# Patient Record
Sex: FEMALE | Race: BLACK OR AFRICAN AMERICAN | Employment: FULL TIME | URBAN - METROPOLITAN AREA
[De-identification: names, ages, dates, MRNs, and addresses within clinical notes are randomized per-mention and may not be internally consistent; named-entity substitution may affect disease eponyms.]

---

## 2020-04-14 ENCOUNTER — HOSPITAL ENCOUNTER (EMERGENCY)
Age: 40
Discharge: HOME | End: 2020-04-14
Attending: EMERGENCY MEDICINE
Payer: COMMERCIAL

## 2020-04-14 VITALS
OXYGEN SATURATION: 99 % | DIASTOLIC BLOOD PRESSURE: 105 MMHG | RESPIRATION RATE: 28 BRPM | HEART RATE: 97 BPM | SYSTOLIC BLOOD PRESSURE: 176 MMHG | TEMPERATURE: 98.4 F | HEIGHT: 64 IN | BODY MASS INDEX: 38.53 KG/M2 | WEIGHT: 225.7 LBS

## 2020-04-14 LAB
ABSOLUTE EOS #: 0.1 K/UL (ref 0–0.44)
ABSOLUTE IMMATURE GRANULOCYTE: 0.04 K/UL (ref 0–0.3)
ABSOLUTE LYMPH #: 3.3 K/UL (ref 1.1–3.7)
ABSOLUTE MONO #: 0.85 K/UL (ref 0.1–1.2)
ANION GAP SERPL CALCULATED.3IONS-SCNC: 11 MMOL/L (ref 9–17)
BASOPHILS # BLD: 0 % (ref 0–2)
BASOPHILS ABSOLUTE: 0.04 K/UL (ref 0–0.2)
BUN BLDV-MCNC: 12 MG/DL (ref 6–20)
BUN/CREAT BLD: 14 (ref 9–20)
CALCIUM SERPL-MCNC: 9.7 MG/DL (ref 8.6–10.4)
CHLORIDE BLD-SCNC: 97 MMOL/L (ref 98–107)
CO2: 30 MMOL/L (ref 20–31)
CREAT SERPL-MCNC: 0.88 MG/DL (ref 0.5–0.9)
DIFFERENTIAL TYPE: NORMAL
EOSINOPHILS RELATIVE PERCENT: 1 % (ref 1–4)
GFR AFRICAN AMERICAN: >60 ML/MIN
GFR NON-AFRICAN AMERICAN: >60 ML/MIN
GFR SERPL CREATININE-BSD FRML MDRD: ABNORMAL ML/MIN/{1.73_M2}
GFR SERPL CREATININE-BSD FRML MDRD: ABNORMAL ML/MIN/{1.73_M2}
GLUCOSE BLD-MCNC: 96 MG/DL (ref 70–99)
HCT VFR BLD CALC: 38.2 % (ref 36.3–47.1)
HEMOGLOBIN: 12.1 G/DL (ref 11.9–15.1)
IMMATURE GRANULOCYTES: 0 %
LYMPHOCYTES # BLD: 32 % (ref 24–43)
MCH RBC QN AUTO: 27.1 PG (ref 25.2–33.5)
MCHC RBC AUTO-ENTMCNC: 31.7 G/DL (ref 28.4–34.8)
MCV RBC AUTO: 85.5 FL (ref 82.6–102.9)
MONOCYTES # BLD: 8 % (ref 3–12)
MYOGLOBIN: 30 NG/ML (ref 25–58)
MYOGLOBIN: 30 NG/ML (ref 25–58)
NRBC AUTOMATED: 0 PER 100 WBC
PDW BLD-RTO: 13.2 % (ref 11.8–14.4)
PLATELET # BLD: 418 K/UL (ref 138–453)
PLATELET ESTIMATE: NORMAL
PMV BLD AUTO: 9 FL (ref 8.1–13.5)
POTASSIUM SERPL-SCNC: 3.6 MMOL/L (ref 3.7–5.3)
RBC # BLD: 4.47 M/UL (ref 3.95–5.11)
RBC # BLD: NORMAL 10*6/UL
SEG NEUTROPHILS: 59 % (ref 36–65)
SEGMENTED NEUTROPHILS ABSOLUTE COUNT: 6.01 K/UL (ref 1.5–8.1)
SODIUM BLD-SCNC: 138 MMOL/L (ref 135–144)
TROPONIN INTERP: NORMAL
TROPONIN INTERP: NORMAL
TROPONIN T: NORMAL NG/ML
TROPONIN T: NORMAL NG/ML
TROPONIN, HIGH SENSITIVITY: 6 NG/L (ref 0–14)
TROPONIN, HIGH SENSITIVITY: 6 NG/L (ref 0–14)
WBC # BLD: 10.3 K/UL (ref 3.5–11.3)
WBC # BLD: NORMAL 10*3/UL

## 2020-04-14 PROCEDURE — 85025 COMPLETE CBC W/AUTO DIFF WBC: CPT

## 2020-04-14 PROCEDURE — 83874 ASSAY OF MYOGLOBIN: CPT

## 2020-04-14 PROCEDURE — 93005 ELECTROCARDIOGRAM TRACING: CPT | Performed by: NURSE PRACTITIONER

## 2020-04-14 PROCEDURE — 6360000002 HC RX W HCPCS: Performed by: NURSE PRACTITIONER

## 2020-04-14 PROCEDURE — 2500000003 HC RX 250 WO HCPCS: Performed by: NURSE PRACTITIONER

## 2020-04-14 PROCEDURE — 99283 EMERGENCY DEPT VISIT LOW MDM: CPT

## 2020-04-14 PROCEDURE — 80048 BASIC METABOLIC PNL TOTAL CA: CPT

## 2020-04-14 PROCEDURE — 96375 TX/PRO/DX INJ NEW DRUG ADDON: CPT

## 2020-04-14 PROCEDURE — 84484 ASSAY OF TROPONIN QUANT: CPT

## 2020-04-14 PROCEDURE — 96374 THER/PROPH/DIAG INJ IV PUSH: CPT

## 2020-04-14 RX ORDER — LISINOPRIL 20 MG/1
20 TABLET ORAL DAILY
Qty: 30 TABLET | Refills: 0 | Status: SHIPPED | OUTPATIENT
Start: 2020-04-14 | End: 2020-04-16

## 2020-04-14 RX ORDER — HYDRALAZINE HYDROCHLORIDE 20 MG/ML
10 INJECTION INTRAMUSCULAR; INTRAVENOUS ONCE
Status: COMPLETED | OUTPATIENT
Start: 2020-04-14 | End: 2020-04-14

## 2020-04-14 RX ORDER — METOPROLOL TARTRATE 5 MG/5ML
5 INJECTION INTRAVENOUS ONCE
Status: COMPLETED | OUTPATIENT
Start: 2020-04-14 | End: 2020-04-14

## 2020-04-14 RX ADMIN — METOPROLOL TARTRATE 5 MG: 5 INJECTION INTRAVENOUS at 10:46

## 2020-04-14 RX ADMIN — HYDRALAZINE HYDROCHLORIDE 10 MG: 20 INJECTION INTRAMUSCULAR; INTRAVENOUS at 11:44

## 2020-04-14 ASSESSMENT — PAIN DESCRIPTION - FREQUENCY: FREQUENCY: CONTINUOUS

## 2020-04-14 ASSESSMENT — PAIN DESCRIPTION - PROGRESSION: CLINICAL_PROGRESSION: NOT CHANGED

## 2020-04-14 ASSESSMENT — PAIN DESCRIPTION - PAIN TYPE: TYPE: ACUTE PAIN

## 2020-04-14 ASSESSMENT — PAIN DESCRIPTION - ONSET: ONSET: ON-GOING

## 2020-04-14 ASSESSMENT — PAIN DESCRIPTION - DESCRIPTORS: DESCRIPTORS: SHARP

## 2020-04-14 ASSESSMENT — PAIN DESCRIPTION - LOCATION: LOCATION: CHEST

## 2020-04-14 NOTE — ED PROVIDER NOTES
[address] [place] [address] ED  EMERGENCY DEPARTMENT ENCOUNTER   ATTENDING ATTESTATION     Pt Name: Candy Bradshaw  MRN: [id number]  [place] 1980  Date of evaluation: 4/14/20       Candy Bradshaw is a 37 y.o. female who presents with Hypertension      MDM:     Patient's EKG shows sinus rhythm with a rate of 90, MI QRS and QTC intervals unremarkable, the patient has normal axis no ST elevations or depressions, no significant T wave changes. Nonspecific EKG. Vitals:   Vitals:    04/14/20 1021 04/14/20 1026 04/14/20 1046 04/14/20 1101   BP: (!) 219/114  (!) 206/110 (!) 198/111   Pulse: 115  104 89   Resp:   19 24   Temp: 98.4 °F (36.9 °C)      SpO2: 99%  95% 94%   Weight:  225 lb 11.2 oz (102.4 kg)     Height:  5' 4\" (1.626 m)           I personally evaluated and examined the patient in conjunction with the Midlevel provider and agree with the assessment, treatment plan, and disposition of the patient as recorded by the midlevel. I performed a history and physical examination of the patient and discussed management with the midlevel. I reviewed the midlevels note and agree with the documented findings and plan of care. Any areas of disagreement are noted on the chart. I was personally present for the key portions of any procedures. I have documented in the chart those procedures where I was not present during the key portions. I have personally reviewed all images and agree with the midlevel's interpretation. I have reviewed the emergency nurses triage note. I agree with the chief complaint, past medical history, past surgical history, allergies, medications, social and family history as documented unless otherwise noted.     Aleksandra Cruz MD  Attending Emergency  Physician                  Serg Martinez MD  04/14/20 0614

## 2020-04-14 NOTE — ED NOTES
ASSESSMENT:   Presents to ED per friend from work. Works at [employer]. States last nite cough real hard and purpose to clear her throat and felt a sudden sharp pain to lt upper ant chest. Just to the lt of sternum. At work was very hot because had sterile gown and everything on. Was feeling anxious. States told staff to check her BP and was very high along with a fast heart rate. States was on lisinopril andoit a year ago, didn't have a Dr. Patel Basket in 2016 when had a child her BP was high and was on multiple BP meds and water pills. recent fever or chills. Denies H/A or vision issues. Denies chest pain other than the pain last nite after coughing hard. Denies cough or n/v. On admission is very pleasant and friendly. Neuro assess neg. Monitor shows /min  (posted).      Suzi Houser RN  04/14/20 3894

## 2020-04-15 LAB
EKG ATRIAL RATE: 90 BPM
EKG P AXIS: 41 DEGREES
EKG P-R INTERVAL: 190 MS
EKG Q-T INTERVAL: 382 MS
EKG QRS DURATION: 84 MS
EKG QTC CALCULATION (BAZETT): 467 MS
EKG R AXIS: 22 DEGREES
EKG T AXIS: 9 DEGREES
EKG VENTRICULAR RATE: 90 BPM

## 2020-04-15 ASSESSMENT — ENCOUNTER SYMPTOMS
NAUSEA: 0
SORE THROAT: 0
WHEEZING: 0
DIARRHEA: 0
SHORTNESS OF BREATH: 0
SINUS PRESSURE: 0
VOMITING: 0
CONSTIPATION: 0
RHINORRHEA: 0
ABDOMINAL PAIN: 0
COUGH: 0
COLOR CHANGE: 0

## 2020-04-16 ENCOUNTER — TELEMEDICINE (OUTPATIENT)
Dept: FAMILY MEDICINE CLINIC | Age: 40
End: 2020-04-16
Payer: COMMERCIAL

## 2020-04-16 VITALS
RESPIRATION RATE: 16 BRPM | HEIGHT: 64 IN | DIASTOLIC BLOOD PRESSURE: 106 MMHG | SYSTOLIC BLOOD PRESSURE: 188 MMHG | WEIGHT: 225 LBS | BODY MASS INDEX: 38.41 KG/M2

## 2020-04-16 PROBLEM — E66.01 CLASS 2 SEVERE OBESITY DUE TO EXCESS CALORIES WITH SERIOUS COMORBIDITY AND BODY MASS INDEX (BMI) OF 38.0 TO 38.9 IN ADULT (HCC): Status: ACTIVE | Noted: 2020-04-16

## 2020-04-16 PROBLEM — E66.812 CLASS 2 SEVERE OBESITY DUE TO EXCESS CALORIES WITH SERIOUS COMORBIDITY AND BODY MASS INDEX (BMI) OF 38.0 TO 38.9 IN ADULT: Status: ACTIVE | Noted: 2020-04-16

## 2020-04-16 PROCEDURE — G8428 CUR MEDS NOT DOCUMENT: HCPCS | Performed by: NURSE PRACTITIONER

## 2020-04-16 PROCEDURE — 99204 OFFICE O/P NEW MOD 45 MIN: CPT | Performed by: NURSE PRACTITIONER

## 2020-04-16 RX ORDER — LISINOPRIL 40 MG/1
40 TABLET ORAL DAILY
Qty: 30 TABLET | Refills: 1 | Status: SHIPPED | OUTPATIENT
Start: 2020-04-16 | End: 2020-06-16 | Stop reason: SDUPTHER

## 2020-04-16 RX ORDER — MULTIVIT-MIN/IRON FUM/FOLIC AC 7.5 MG-4
1 TABLET ORAL DAILY
Qty: 30 TABLET | Refills: 0 | COMMUNITY
Start: 2020-04-16 | End: 2020-07-07

## 2020-04-16 RX ORDER — CHLORTHALIDONE 25 MG/1
25 TABLET ORAL DAILY
Qty: 30 TABLET | Refills: 1 | Status: SHIPPED | OUTPATIENT
Start: 2020-04-16 | End: 2020-06-16 | Stop reason: SDUPTHER

## 2020-04-16 ASSESSMENT — ENCOUNTER SYMPTOMS
GASTROINTESTINAL NEGATIVE: 1
ANAL BLEEDING: 0
RESPIRATORY NEGATIVE: 1
CHEST TIGHTNESS: 0
VOMITING: 0
BLOOD IN STOOL: 0
WHEEZING: 0
EYES NEGATIVE: 1
DIARRHEA: 0
COUGH: 0
COLOR CHANGE: 0
SHORTNESS OF BREATH: 0
NAUSEA: 0
ALLERGIC/IMMUNOLOGIC NEGATIVE: 1

## 2020-04-16 NOTE — PROGRESS NOTES
Conjunctiva/sclera: Conjunctivae normal.   Neck:      Musculoskeletal: Normal range of motion and neck supple. Pulmonary:      Effort: Pulmonary effort is normal. No respiratory distress. Musculoskeletal: Normal range of motion. Skin:     General: Skin is warm and dry. Coloration: Skin is not jaundiced or pale. Findings: No bruising, erythema, lesion or rash. Neurological:      Mental Status: She is alert and oriented to person, place, and time. Psychiatric:         Mood and Affect: Mood normal.         Behavior: Behavior normal.         Thought Content: Thought content normal.         Judgment: Judgment normal.           Assessment:         1. Encounter to establish care    2. Essential hypertension    3. Mixed hyperlipidemia    4. Class 2 severe obesity due to excess calories with serious comorbidity and body mass index (BMI) of 38.0 to 38.9 in adult ([facility]    5. Hospital discharge follow-up        Plan:     1. Encounter to establish care    Preventative care to discuss at follow-up    2. Essential hypertension    - CBC Auto Differential; Future  - Comprehensive Metabolic Panel; Future  - TSH with Reflex; Future  - lisinopril (PRINIVIL;ZESTRIL) 40 MG tablet; Take 1 tablet by mouth daily  Dispense: 30 tablet; Refill: 1  - chlorthalidone (HYGROTON) 25 MG tablet; Take 1 tablet by mouth daily  Dispense: 30 tablet;  Refill: 1    Blood pressure grossly above goal  Increase lisinopril to 40 mg qd, add chlorthalidone  Side effects discussed   Monitor blood pressure qd at home, follow-up 2 weeks  Goal 130/80 or below   ER if symptomatic   Long term consequences of HTN discussed including CVA, CVD, CKD etc.  Lifestyle modifications discussed    Weight reduction - can decrease blood pressure by 5-20 points per 10 kg of weight loss   DASH Diet - consume diet rich in fruits, vegetables, and low-fat dairy products with a reduced content of saturated and total fat  Dietary sodium restriction - Reduce dietary sodium intake to no more than 100meq/day (2.4 g sodium)  Physical activity - Engage in regular aerobic physical activity routinely (150min/wk or 30 minutes most days) such as brisk walking, swimming, running, cycling  Moderate consumption of alcohol - limit to more than 2 drinks/day in most men and 1 drink/day in women   Smoking cessation     Credit - UpToDate     3. Mixed hyperlipidemia    - Lipid, Fasting; Future    4. Class 2 severe obesity due to excess calories with serious comorbidity and body mass index (BMI) of 38.0 to 38.9 in adult (HCC)    - CBC Auto Differential; Future  - Comprehensive Metabolic Panel; Future  - Hemoglobin A1C; Future    Encouraged routine exercise 150 min/week  Encouraged dietary modifications including avoidance of carbs, sugary foods, diet high in fruits, vegetables and protein  Discussed [facility] in the future    5. Hospital discharge follow-up    ER results / records reviewed thoroughly     Julia Campos is a 37 y.o. female being evaluated by a Virtual Visit (video visit) encounter to address concerns as mentioned above. A caregiver was present when appropriate. Due to this being a TeleHealth encounter (During PKICO-93 public health emergency), evaluation of the following organ systems was limited: Vitals/Constitutional/EENT/Resp/CV/GI//MS/Neuro/Skin/Heme-Lymph-Imm. Pursuant to the emergency declaration under the [facility], [address] [place] [address] authority and the [employer] and [employer] Act, this Virtual Visit was conducted with patient's (and/or legal guardian's) consent, to reduce the patient's risk of exposure to COVID-19 and provide necessary medical care. The patient (and/or legal guardian) has also been advised to contact this office for worsening conditions or problems, and seek emergency medical treatment and/or call 911 if deemed necessary.      Services were provided through a

## 2020-04-17 ENCOUNTER — TELEPHONE (OUTPATIENT)
Dept: FAMILY MEDICINE CLINIC | Age: 40
End: 2020-04-17

## 2020-04-17 RX ORDER — ADHESIVE BANDAGE 3/4"
BANDAGE TOPICAL
Qty: 1 EACH | Refills: 0 | Status: SHIPPED | OUTPATIENT
Start: 2020-04-17 | End: 2020-05-04

## 2020-04-17 NOTE — TELEPHONE ENCOUNTER
The [address] she requested is not available to e-send scripts in Epic. Since I am not in the office to fax I sent RX to Terri instead, unless office can call and give verbal order to [facility] [address]

## 2020-04-17 NOTE — TELEPHONE ENCOUNTER
Patient is asking Palm[facility] to order her a blood pressure cuff so she can monitor her numbers and report back to Palmira. She states the ED was going to fax it to the pharmacy , but the are still waiting. Please contact her today to advise if able to do so. Patient is requesting this to be sent to [facility] on [address]. [phone]    Thank you.

## 2020-04-23 ENCOUNTER — TELEPHONE (OUTPATIENT)
Dept: FAMILY MEDICINE CLINIC | Age: 40
End: 2020-04-23

## 2020-04-23 NOTE — TELEPHONE ENCOUNTER
If she has chest pain or shortness of breath and her blood pressure is that high then yes needs to go to ER.

## 2020-05-04 ENCOUNTER — TELEMEDICINE (OUTPATIENT)
Dept: FAMILY MEDICINE CLINIC | Age: 40
End: 2020-05-04
Payer: COMMERCIAL

## 2020-05-04 VITALS — DIASTOLIC BLOOD PRESSURE: 88 MMHG | RESPIRATION RATE: 16 BRPM | SYSTOLIC BLOOD PRESSURE: 145 MMHG

## 2020-05-04 PROCEDURE — 99214 OFFICE O/P EST MOD 30 MIN: CPT | Performed by: NURSE PRACTITIONER

## 2020-05-04 PROCEDURE — G8428 CUR MEDS NOT DOCUMENT: HCPCS | Performed by: NURSE PRACTITIONER

## 2020-05-04 RX ORDER — HYDROXYZINE HYDROCHLORIDE 25 MG/1
25-50 TABLET, FILM COATED ORAL 3 TIMES DAILY PRN
Qty: 90 TABLET | Refills: 0 | Status: SHIPPED | OUTPATIENT
Start: 2020-05-04 | End: 2020-08-18

## 2020-05-04 RX ORDER — AMLODIPINE BESYLATE 5 MG/1
5 TABLET ORAL DAILY
Qty: 30 TABLET | Refills: 2 | Status: SHIPPED | OUTPATIENT
Start: 2020-05-04 | End: 2020-07-07 | Stop reason: SDUPTHER

## 2020-05-04 ASSESSMENT — ENCOUNTER SYMPTOMS
EYES NEGATIVE: 1
NAUSEA: 0
VOMITING: 0
GASTROINTESTINAL NEGATIVE: 1
DIARRHEA: 0
ALLERGIC/IMMUNOLOGIC NEGATIVE: 1
CHEST TIGHTNESS: 0
WHEEZING: 0
SHORTNESS OF BREATH: 0
COLOR CHANGE: 0
COUGH: 0
RESPIRATORY NEGATIVE: 1

## 2020-05-04 NOTE — PROGRESS NOTES
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 Negative for appetite change, chills, diaphoresis, fever and unexpected weight change. HENT: Negative. Eyes: Negative. Respiratory: Negative. Negative for cough, chest tightness, shortness of breath and wheezing. Cardiovascular: Positive for chest pain (chest soreness s/p muscle pull). Negative for palpitations and leg swelling. Gastrointestinal: Negative. Negative for diarrhea, nausea and vomiting. Endocrine: Negative. Genitourinary: Negative. Musculoskeletal: Negative. Skin: Negative. Negative for color change, pallor, rash and wound. Allergic/Immunologic: Negative. Neurological: Positive for dizziness. Negative for seizures, speech difficulty and headaches. Hematological: Negative. Psychiatric/Behavioral: Negative for sleep disturbance. The patient is nervous/anxious. Objective:     Physical Exam  Constitutional:       General: She is not in acute distress. Appearance: Normal appearance. She is obese.  She is not ill-appearing, toxic-appearing or diaphoretic. HENT:      Head: Normocephalic and atraumatic. Right Ear: External ear normal.      Left Ear: External ear normal.      Nose: Nose normal.   Eyes:      General: No scleral icterus. Right eye: No discharge. Left eye: No discharge. Conjunctiva/sclera: Conjunctivae normal.   Pulmonary:      Effort: Pulmonary effort is normal. No respiratory distress. Musculoskeletal: Normal range of motion. Skin:     General: Skin is warm and dry. Coloration: Skin is not jaundiced or pale. Findings: No bruising, erythema, lesion or rash. Neurological:      Mental Status: She is alert and oriented to person, place, and time. Psychiatric:         Attention and Perception: Attention and perception normal.         Mood and Affect: Affect normal. Mood is anxious. Speech: Speech normal.         Behavior: Behavior normal. Behavior is cooperative. Thought Content: Thought content normal. Thought content does not include homicidal or suicidal ideation. Cognition and Memory: Cognition and memory normal.         Judgment: Judgment normal.       Vitals:    05/04/20 1452   BP: (!) 145/88   Resp: 16       Assessment:         1. Essential hypertension    2. Adjustment disorder with anxious mood        Plan:     1. Essential hypertension    - amLODIPine (NORVASC) 5 MG tablet; Take 1 tablet by mouth daily  Dispense: 30 tablet; Refill: 2    Blood pressure above goal   Continue lisinopril / chlorthalidone  ADD amlodipine   Side effects discussed  Continue close home monitoring  F/u in office 3-4 weeks with log to review   Update labs as discussed  Lifestyle modifications    2. Adjustment disorder with anxious mood    - hydrOXYzine (ATARAX) 25 MG tablet; Take 1-2 tablets by mouth 3 times daily as needed for Anxiety  Dispense: 90 tablet;  Refill: 0    Encouraged continued counseling / CBT  Reassurance given   Discussed daily vs. PRN medication  Opted for PRN medication, side

## 2020-05-05 ENCOUNTER — HOSPITAL ENCOUNTER (OUTPATIENT)
Age: 40
Discharge: HOME | End: 2020-05-05
Payer: COMMERCIAL

## 2020-05-05 LAB
ABSOLUTE EOS #: 0.28 K/UL (ref 0–0.44)
ABSOLUTE IMMATURE GRANULOCYTE: 0.03 K/UL (ref 0–0.3)
ABSOLUTE LYMPH #: 2.43 K/UL (ref 1.1–3.7)
ABSOLUTE MONO #: 0.79 K/UL (ref 0.1–1.2)
ALBUMIN SERPL-MCNC: 3.9 G/DL (ref 3.5–5.2)
ALBUMIN/GLOBULIN RATIO: 1 (ref 1–2.5)
ALP BLD-CCNC: 56 U/L (ref 35–104)
ALT SERPL-CCNC: 14 U/L (ref 5–33)
ANION GAP SERPL CALCULATED.3IONS-SCNC: 12 MMOL/L (ref 9–17)
AST SERPL-CCNC: 16 U/L
BASOPHILS # BLD: 1 % (ref 0–2)
BASOPHILS ABSOLUTE: 0.04 K/UL (ref 0–0.2)
BILIRUB SERPL-MCNC: 0.21 MG/DL (ref 0.3–1.2)
BUN BLDV-MCNC: 20 MG/DL (ref 6–20)
BUN/CREAT BLD: ABNORMAL (ref 9–20)
CALCIUM SERPL-MCNC: 9.3 MG/DL (ref 8.6–10.4)
CHLORIDE BLD-SCNC: 98 MMOL/L (ref 98–107)
CHOLESTEROL, FASTING: 212 MG/DL
CHOLESTEROL/HDL RATIO: 4.6
CO2: 28 MMOL/L (ref 20–31)
CREAT SERPL-MCNC: 0.89 MG/DL (ref 0.5–0.9)
DIFFERENTIAL TYPE: ABNORMAL
EOSINOPHILS RELATIVE PERCENT: 3 % (ref 1–4)
ESTIMATED AVERAGE GLUCOSE: 114 MG/DL
GFR AFRICAN AMERICAN: >60 ML/MIN
GFR NON-AFRICAN AMERICAN: >60 ML/MIN
GFR SERPL CREATININE-BSD FRML MDRD: ABNORMAL ML/MIN/{1.73_M2}
GFR SERPL CREATININE-BSD FRML MDRD: ABNORMAL ML/MIN/{1.73_M2}
GLUCOSE BLD-MCNC: 93 MG/DL (ref 70–99)
HBA1C MFR BLD: 5.6 % (ref 4–6)
HCT VFR BLD CALC: 38 % (ref 36.3–47.1)
HDLC SERPL-MCNC: 46 MG/DL
HEMOGLOBIN: 11.5 G/DL (ref 11.9–15.1)
IMMATURE GRANULOCYTES: 0 %
LDL CHOLESTEROL: 147 MG/DL (ref 0–130)
LYMPHOCYTES # BLD: 30 % (ref 24–43)
MCH RBC QN AUTO: 26.5 PG (ref 25.2–33.5)
MCHC RBC AUTO-ENTMCNC: 30.3 G/DL (ref 28.4–34.8)
MCV RBC AUTO: 87.6 FL (ref 82.6–102.9)
MONOCYTES # BLD: 10 % (ref 3–12)
NRBC AUTOMATED: 0 PER 100 WBC
PDW BLD-RTO: 12.7 % (ref 11.8–14.4)
PLATELET # BLD: 453 K/UL (ref 138–453)
PLATELET ESTIMATE: ABNORMAL
PMV BLD AUTO: 9.9 FL (ref 8.1–13.5)
POTASSIUM SERPL-SCNC: 3.9 MMOL/L (ref 3.7–5.3)
RBC # BLD: 4.34 M/UL (ref 3.95–5.11)
RBC # BLD: ABNORMAL 10*6/UL
SEG NEUTROPHILS: 56 % (ref 36–65)
SEGMENTED NEUTROPHILS ABSOLUTE COUNT: 4.64 K/UL (ref 1.5–8.1)
SODIUM BLD-SCNC: 138 MMOL/L (ref 135–144)
TOTAL PROTEIN: 7.7 G/DL (ref 6.4–8.3)
TRIGLYCERIDE, FASTING: 94 MG/DL
TSH SERPL DL<=0.05 MIU/L-ACNC: 0.62 MIU/L (ref 0.3–5)
VLDLC SERPL CALC-MCNC: ABNORMAL MG/DL (ref 1–30)
WBC # BLD: 8.2 K/UL (ref 3.5–11.3)
WBC # BLD: ABNORMAL 10*3/UL

## 2020-05-05 PROCEDURE — 85025 COMPLETE CBC W/AUTO DIFF WBC: CPT

## 2020-05-05 PROCEDURE — 84443 ASSAY THYROID STIM HORMONE: CPT

## 2020-05-05 PROCEDURE — 80053 COMPREHEN METABOLIC PANEL: CPT

## 2020-05-05 PROCEDURE — 80061 LIPID PANEL: CPT

## 2020-05-05 PROCEDURE — 83036 HEMOGLOBIN GLYCOSYLATED A1C: CPT

## 2020-05-05 PROCEDURE — 36415 COLL VENOUS BLD VENIPUNCTURE: CPT

## 2020-05-14 ENCOUNTER — TELEPHONE (OUTPATIENT)
Dept: FAMILY MEDICINE CLINIC | Age: 40
End: 2020-05-14

## 2020-05-14 NOTE — TELEPHONE ENCOUNTER
You started pt on amlodipine  5 mg and yesterday every time she stood up she was dizzy and had to sit down and she felt fatigued. She called a nurse at [address] [place] [address]. The nurse told her she may be taking to many meds.   Pt took her bp standing up and it was  123/76 and sittting it was 128/86

## 2020-06-02 ENCOUNTER — OFFICE VISIT (OUTPATIENT)
Dept: FAMILY MEDICINE CLINIC | Age: 40
End: 2020-06-02
Payer: COMMERCIAL

## 2020-06-02 VITALS
WEIGHT: 228.2 LBS | OXYGEN SATURATION: 98 % | HEART RATE: 110 BPM | SYSTOLIC BLOOD PRESSURE: 118 MMHG | BODY MASS INDEX: 39.17 KG/M2 | TEMPERATURE: 97.2 F | DIASTOLIC BLOOD PRESSURE: 80 MMHG

## 2020-06-02 PROBLEM — R00.0 TACHYCARDIA: Status: ACTIVE | Noted: 2020-06-02

## 2020-06-02 PROCEDURE — 1036F TOBACCO NON-USER: CPT | Performed by: NURSE PRACTITIONER

## 2020-06-02 PROCEDURE — 93000 ELECTROCARDIOGRAM COMPLETE: CPT | Performed by: NURSE PRACTITIONER

## 2020-06-02 PROCEDURE — G8427 DOCREV CUR MEDS BY ELIG CLIN: HCPCS | Performed by: NURSE PRACTITIONER

## 2020-06-02 PROCEDURE — G8417 CALC BMI ABV UP PARAM F/U: HCPCS | Performed by: NURSE PRACTITIONER

## 2020-06-02 PROCEDURE — 99214 OFFICE O/P EST MOD 30 MIN: CPT | Performed by: NURSE PRACTITIONER

## 2020-06-02 RX ORDER — ECHINACEA 400 MG
1 CAPSULE ORAL DAILY
Qty: 1 CAPSULE | Refills: 0 | COMMUNITY
Start: 2020-06-02 | End: 2020-07-07 | Stop reason: ALTCHOICE

## 2020-06-02 RX ORDER — MELATONIN
1000 DAILY
Qty: 30 TABLET | Refills: 0 | COMMUNITY
Start: 2020-06-02 | End: 2020-09-15 | Stop reason: ALTCHOICE

## 2020-06-02 ASSESSMENT — ENCOUNTER SYMPTOMS
RESPIRATORY NEGATIVE: 1
SHORTNESS OF BREATH: 0
COLOR CHANGE: 0
EYES NEGATIVE: 1
COUGH: 0
CHEST TIGHTNESS: 0
ALLERGIC/IMMUNOLOGIC NEGATIVE: 1
GASTROINTESTINAL NEGATIVE: 1
WHEEZING: 0

## 2020-06-02 NOTE — PATIENT INSTRUCTIONS
Weight reduction - can decrease blood pressure by 5-20 points per 10 kg of weight loss   DASH Diet - consume diet rich in fruits, vegetables, and low-fat dairy products with a reduced content of saturated and total fat  Dietary sodium restriction - Reduce dietary sodium intake to no more than 100meq/day (2.4 g sodium)  Physical activity - Engage in regular aerobic physical activity routinely (150min/wk or 30 minutes most days) such as brisk walking, swimming, running, cycling  Moderate consumption of alcohol - limit to more than 2 drinks/day in most men and 1 drink/day in women   Smoking cessation     Credit - UpToDate   Patient Education        High Cholesterol: Care Instructions  Your Care Instructions     Cholesterol is a type of fat in your blood. It is needed for many body functions, such as making new cells. Cholesterol is made by your body. It also comes from food you eat. High cholesterol means that you have too much of the fat in your blood. This raises your risk of a heart attack and stroke. LDL and HDL are part of your total cholesterol. LDL is the \"bad\" cholesterol. High LDL can raise your risk for heart disease, heart attack, and stroke. HDL is the \"good\" cholesterol. It helps clear bad cholesterol from the body. High HDL is linked with a lower risk of heart disease, heart attack, and stroke. Your cholesterol levels help your doctor find out your risk for having a heart attack or stroke. You and your doctor can talk about whether you need to lower your risk and what treatment is best for you. A heart-healthy lifestyle along with medicines can help lower your cholesterol and your risk. The way you choose to lower your risk will depend on how high your risk is for heart attack and stroke. It will also depend on how you feel about taking medicines. Follow-up care is a key part of your treatment and safety. Be sure to make and go to all appointments, and call your doctor if you are having problems.  It's disclaims any warranty or liability for your use of this information.

## 2020-06-04 ENCOUNTER — TELEPHONE (OUTPATIENT)
Dept: BARIATRICS/WEIGHT MGMT | Age: 40
End: 2020-06-04

## 2020-06-04 NOTE — TELEPHONE ENCOUNTER
Online Info Session Completed:  on 06/04/20 okay to schedule with Dr. Katelynn Carver   with [facility]    Patient informed the following: This is NOT a guarantee of payment  When stating that you have a Benefit or Coverage for Bariatric Surgery - that means that you may qualify for the surgery  Bariatric Surgery is considered an elective procedure, patient is responsible to know their benefits . Any information we obtain when calling your insurance  is not  a guarantee of  coverage  and/or  benefit. Appointment Note :   New Patient , [facility],   6   month visits,  PG Fee $200,  Mailed Packet or advised to arrive  early      Remind Patient of $200 Program fee with $ 100 required at Second visit with office on initial dietician visit. Remind Patient they must be nicotine free. They will be tested at the beginning of the program and prior to surgery. Advise Patient Responsible for out of pocket, copay at medical visits, Deductible and coinsurance applied to medical visits and procedure. You will be responsible for any of the following:  · Copays   · Deductibles   · Co insurances     The items mentioned above are indicated or required by your insurance plan. Your deductible and coinsurance are applied to medical visits and procedures. Verified with patient if he or she has had any previous bariatric surgery? no  ( If yes ,advise patient of transfer of care process and program fee)       .

## 2020-06-16 DIAGNOSIS — I10 ESSENTIAL HYPERTENSION: ICD-10-CM

## 2020-06-16 RX ORDER — LISINOPRIL 40 MG/1
40 TABLET ORAL DAILY
Qty: 30 TABLET | Refills: 1 | Status: SHIPPED | OUTPATIENT
Start: 2020-06-16 | End: 2020-07-07 | Stop reason: ALTCHOICE

## 2020-06-16 RX ORDER — CHLORTHALIDONE 25 MG/1
25 TABLET ORAL DAILY
Qty: 30 TABLET | Refills: 1 | Status: SHIPPED | OUTPATIENT
Start: 2020-06-16 | End: 2020-07-07 | Stop reason: SDUPTHER

## 2020-06-16 NOTE — TELEPHONE ENCOUNTER
Last visit: 03/02/20  Last Med refill: 04/16/20  Does patient have enough medication for 72 hours: Yes    Next Visit Date:07/07/20  Future Appointments   Date Time Provider Nevin Sanchez   6/17/2020  9:15 AM Yessica Krishna, DO bariatric caballero Konrad Garcia   7/7/2020  1:40 PM Essie Holly, APRN - CNP [facility] [address] Maintenance   Topic Date Due    DTaP/Tdap/Td vaccine (1 - Tdap) 11/06/1997    Cervical cancer screen  01/19/2018    Flu vaccine (Season Ended) 09/01/2020    Potassium monitoring  05/05/2021    Creatinine monitoring  05/05/2021    Lipid screen  05/05/2025    HIV screen  Completed    Hepatitis A vaccine  Aged Out    Hepatitis B vaccine  Aged Out    Hib vaccine  Aged Out    Meningococcal (ACWY) vaccine  Aged Out    Pneumococcal 0-64 years Vaccine  Aged Out       Hemoglobin A1C (%)   Date Value   05/05/2020 5.6   01/14/2016 5.2   07/30/2014 5.0             ( goal A1C is < 7)   No results found for: LABMICR  LDL Cholesterol (mg/dL)   Date Value   05/05/2020 147 (H)   07/30/2014 92       (goal LDL is <100)   AST (U/L)   Date Value   05/05/2020 16     ALT (U/L)   Date Value   05/05/2020 14     BUN (mg/dL)   Date Value   05/05/2020 20     BP Readings from Last 3 Encounters:   06/02/20 118/80   05/04/20 (!) 145/88   04/16/20 (!) 188/106          (goal 120/80)    All Future Testing planned in [facility]  Lab Frequency Next Occurrence   Magnesium Once 06/02/2020               Patient Active Problem List:     Hyperlipidemia     Essential hypertension     Class 2 severe obesity due to excess calories with serious comorbidity and body mass index (BMI) of 38.0 to 38.9 in adult (HCC)     Tachycardia

## 2020-06-17 ENCOUNTER — OFFICE VISIT (OUTPATIENT)
Dept: BARIATRICS/WEIGHT MGMT | Age: 40
End: 2020-06-17
Payer: COMMERCIAL

## 2020-06-17 VITALS
BODY MASS INDEX: 39.09 KG/M2 | HEART RATE: 64 BPM | TEMPERATURE: 97.6 F | DIASTOLIC BLOOD PRESSURE: 70 MMHG | WEIGHT: 229 LBS | SYSTOLIC BLOOD PRESSURE: 110 MMHG | HEIGHT: 64 IN

## 2020-06-17 PROCEDURE — G8427 DOCREV CUR MEDS BY ELIG CLIN: HCPCS | Performed by: SURGERY

## 2020-06-17 PROCEDURE — 1036F TOBACCO NON-USER: CPT | Performed by: SURGERY

## 2020-06-17 PROCEDURE — 99204 OFFICE O/P NEW MOD 45 MIN: CPT | Performed by: SURGERY

## 2020-06-17 PROCEDURE — G8417 CALC BMI ABV UP PARAM F/U: HCPCS | Performed by: SURGERY

## 2020-06-17 NOTE — LETTER
Visit Date: 6/17/2020    Patient: Victorino Schaumann  YOB: 1980    Dear Dr. Alex Wagner, APRN - CNP,      I had the pleasure of seeing Roosevelt Jimenez in the office today for a consult for weight loss surgery. Her current Weight: 229 lb (103.9 kg), which gives her a Body mass index is 39 kg/m². We had a long discussion regarding surgical options for weight loss and improvement in co-morbidities. She is considering a bariatric procedure. We will start the preoperative workup, which includes bloodwork, psychological evaluation, support group attendance, and preoperative medical clearance from you. We will also initiate pre-certification for surgery, which requires a letter of medical necessity from you and often office notes documenting a weight history and co-morbidities. Roosevelt Jimenez will require 6 months of medically supervised visits as specified by the patient's insurance. Thank you for allowing me to participate in the care of your patient. If you have any questions or concerns, please do not hesitate to call.       Sincerely,     Laura Álvarez DO  Director of Bariatric and Minimally Invasive Surgery  [facility]CI[facility] MARLOW [facility], [address] [facility], [address] [facility] [place]els: [phone]  F: [phone]

## 2020-06-28 NOTE — PROGRESS NOTES
[facility]  [facility]  [address] [address]  Dept: [phone]    SURGICAL WEIGHT MANAGEMENT PROGRAM  PROGRESS NOTE INITIAL EVALUATION     Patient: Aurelia Atkinson        Service Date: 2020      HPI:     Chief Complaint   Patient presents with    Bariatric, Initial Visit    Weight Loss       The patient is a pleasant 37y.o. year old female  with morbid obesity, who stands Height: 5' 4.25\" (163.2 cm) tall with a weight of Weight: 229 lb (103.9 kg) , resulting in a BMI of Body mass index is 39 kg/m². The patient suffers from multiple co-morbidities as a result of morbid obesity, including: Hypertension, Hyperlipidemia, GERD, Depression and Anxiety. She has suffered from obesity for many years. The patient denies  a history of myocardial infarction, deep vein thrombosis, pulmonary embolism, renal failure, hepatic failure, stroke and seizure. The patient has failed multiple attempts at non-surgical weight loss, and is now seeking surgical intervention to promote permanent and consistent weight loss. She  has chosen Deepak-en-Y Gastric Bypass. She is well educated regarding it, as she has recently viewed our weight loss surgery informational seminar .      Medical History:  Past Medical History:   Diagnosis Date    Anxiety     GERD (gastroesophageal reflux disease)     Hyperlipidemia     Hypertension     Major depressive disorder, recurrent episode, mild ([facility] [address] 10/21/2015       Surgical History:  Past Surgical History:   Procedure Laterality Date     SECTION      x1       Family History:      Problem Relation Age of Onset    Hypertension Mother     Asthma Mother     Hypertension Maternal Aunt     Asthma Maternal Uncle     Hypertension Maternal Uncle     Arthritis Maternal Grandmother     Hypertension Maternal Grandmother     Stroke Maternal Grandmother     Breast Cancer Neg Hx     Cancer Neg Hx     Colon Cancer Neg Hx     Diabetes Neg Hx     Eclampsia Neg Hx     Ovarian Cancer Neg Hx      Labor Neg Hx     Spont Abortions Neg Hx        Social History:   Social History     Tobacco Use    Smoking status: Never Smoker    Smokeless tobacco: Never Used   Substance Use Topics    Alcohol use: No     Alcohol/week: 0.0 standard drinks    Drug use: No       Current Med List:  Current Outpatient Medications   Medication Sig Dispense Refill    lisinopril (PRINIVIL;ZESTRIL) 40 MG tablet Take 1 tablet by mouth daily 30 tablet 1    chlorthalidone (HYGROTON) 25 MG tablet Take 1 tablet by mouth daily 30 tablet 1    Flaxseed Oil OIL OTC supplement 1 Bottle 0    vitamin D3 (CHOLECALCIFEROL) 25 MCG (1000 UT) TABS tablet Take 1 tablet by mouth daily 30 tablet 0    Black Elderberry,Berry-Flower, 575 MG CAPS Take 1 tablet by mouth daily 1 capsule 0    Garlic 10 MG CAPS Take 1 tablet by mouth daily 1 capsule 0    amLODIPine (NORVASC) 5 MG tablet Take 1 tablet by mouth daily 30 tablet 2    hydrOXYzine (ATARAX) 25 MG tablet Take 1-2 tablets by mouth 3 times daily as needed for Anxiety 90 tablet 0    Multiple Vitamins-Minerals (MULTIVITAMIN WITH MINERALS) tablet Take 1 tablet by mouth daily 30 tablet 0     No current facility-administered medications for this visit. No Known Allergies       SOCIAL:      This patient is alone for the evaluation today.       [] HIV Risk Factors (i.e.) intravenous drug abuser; at risk sexual behavior; received blood products    [] TB Risk Factors (i.e.) Medically underserved, institutional care, foreign born, endemic area; exposure to active case    [] Hepatitis B&C Risk Factors (i.e.) Received blood transfusion prior to ; recreational drug use; high risk sexual behaviors; tattoos or body piercings; contact with blood or needle sticks in the workplace    Comprehension    Ability to grasp concepts and respond to questions:   [x] High   [] Medium   [] Low    Motivation    [x] Asks Questions; eager to learn   [] Needs education   [] Extreme anxiety    [] uncooperative   [] Denies need for education    English Speaking Ability    [x] Speaks English well   [] Reads [place] well   [] Understands spoken english    [] Understands written English   [] No need for interpretive support      [] Might benefit from interpretive support   []  required for all services     REVIEW OF SYSTEMS: (Negative unless marked otherwise)       Do you or have you had any of the following?   Cardiovascular YES NO Respiratory YES NO   High Blood Pressure   [x]   [] COPD   []   []   Heart Attack   []   [] TB/Positive skin Test   []   []   Congestive Heart Failure   []   [] Obstructive Sleep Apnea   []   []   Coronary Artery Disease   []   [] Asthma   []   [x]   Circulation Problems   []   []      Activity Intolerance   []   [] Gastrointestinal YES NO   Peripheral Vascular Disease   []   [] Gastric Problems   []   []        Colorectal problems   []   []   Hematological YES NO Ulcer disease   []   []   Bleeding Tendencies   []   [] Liver disease   []   []   Blood Transfusion last 30d   []   [] Gallstones   []   []   Anemia   []   [] Refulx or Heartburn   [x]   []   Blood Clots   []   []      High Cholesterol   [x]   [] Muscoloskeletal YES NO   High Triglycerides   []   [] Joint Limitations   []   []      Muscle Weakness   []   []   Eyes, Ears, Nose, Throat YES NO Multiple Sclerosis   []   []   Cataracts   [x]   [] Arthritis   []   []   Glasses   [x]   []      Blurred Vision   []   [] Cancer   []   []   Hearing Aids   []   [] Type:     Ringing in Ears   []   []      Difficulty Swallowing   []   [] Encodrine YES NO      Diabetes   []   []   Neurological YES NO Thyroid   []   []   Stroke   []   []      Seizure   []   [] Psychiatric Disorder YES NO   Dizziness/Blackouts/Fainting   []   [] Depression   []   []   Memory Impairement   []   [] Bipolar   []   []   Parkinson's   []   [] Anxiety disorder   [x]   [] CHEST STANDARD (2 VW)    Full PFT Study With Bronchodilator    INES Hanna MD, Cardiology, Perlie Gilford, MD, Pulmonology, [place]    Baseline Diagnostic Sleep Study   2. Essential hypertension  CBC Auto Differential    Comprehensive Metabolic Panel    Ferritin    Hemoglobin A1C    Iron and TIBC    TSH without Reflex    T4, Free    PTH, Intact    Magnesium    Lipid Panel    Vitamin A    Vitamin B1    Vitamin B12 & Folate    Vitamin D 25 Hydroxy    Zinc    Urine Drug Screen    Nicotine, Blood    XR CHEST STANDARD (2 VW)    Full PFT Study With Bronchodilator    INES Hanna MD, Cardiology, Perlie Gilford, MD, Pulmonology, [place]    Baseline Diagnostic Sleep Study   3. Gastroesophageal reflux disease without esophagitis  CBC Auto Differential    Comprehensive Metabolic Panel   4. Snoring  Full PFT Study With Bronchodilator    Reina Solis MD, Pulmonology, [place]    Baseline Diagnostic Sleep Study          Initial Testing     Primary Procedure: Deepak-en-Y Gastric Bypass     Other Procedures:None    Labwork: Initial Pre-surgical Lab Tests (CMP, TSH, Fasting Lipid Profile, Mg, Zinc, Vit B1 (whole blood), Vit B12, 25-OH Vit D, Fe,  Ferritin,  Folate)    Imaging: None    Endoscopic Studies: Upper GI Endoscopy for GERD which has been untreated. Psychological Assessment: Psychological Evaluation and Clearance    Nutrition Assessment: Bariatric Nutrition Assessment and Clearance    Cardiology Risk Stratification:  Cardiology clearance with echo    Pulmonary Evaluation: Obstructive Sleep Apnea Evaluation, Pulmonary Clearance, PFT Screen and Copy of Previous Sleep Study    Other  Consultations: PCP clearance    Physician Supervised Diet and Exercise required by the patients insurance company: 6 months.       Surgical Diet requirement:  2 weeks    Final Testing  Screening Chest Xray  and EKG within 6 months of date of surgery    Labwork:  Final Lab Tests  within 3

## 2020-07-03 ENCOUNTER — HOSPITAL ENCOUNTER (OUTPATIENT)
Age: 40
Discharge: HOME | End: 2020-07-05
Payer: COMMERCIAL

## 2020-07-03 ENCOUNTER — HOSPITAL ENCOUNTER (OUTPATIENT)
Dept: GENERAL RADIOLOGY | Age: 40
Discharge: HOME | End: 2020-07-05
Payer: COMMERCIAL

## 2020-07-03 ENCOUNTER — HOSPITAL ENCOUNTER (OUTPATIENT)
Age: 40
Discharge: HOME | End: 2020-07-03
Payer: COMMERCIAL

## 2020-07-03 LAB
ABSOLUTE EOS #: 0.17 K/UL (ref 0–0.44)
ABSOLUTE IMMATURE GRANULOCYTE: <0.03 K/UL (ref 0–0.3)
ABSOLUTE LYMPH #: 2.3 K/UL (ref 1.1–3.7)
ABSOLUTE MONO #: 0.67 K/UL (ref 0.1–1.2)
ALBUMIN SERPL-MCNC: 3.9 G/DL (ref 3.5–5.2)
ALBUMIN/GLOBULIN RATIO: 1 (ref 1–2.5)
ALP BLD-CCNC: 50 U/L (ref 35–104)
ALT SERPL-CCNC: 11 U/L (ref 5–33)
AMPHETAMINE SCREEN URINE: NEGATIVE
ANION GAP SERPL CALCULATED.3IONS-SCNC: 14 MMOL/L (ref 9–17)
AST SERPL-CCNC: 15 U/L
BARBITURATE SCREEN URINE: NEGATIVE
BASOPHILS # BLD: 1 % (ref 0–2)
BASOPHILS ABSOLUTE: 0.05 K/UL (ref 0–0.2)
BENZODIAZEPINE SCREEN, URINE: NEGATIVE
BILIRUB SERPL-MCNC: 0.35 MG/DL (ref 0.3–1.2)
BUN BLDV-MCNC: 16 MG/DL (ref 6–20)
BUN/CREAT BLD: ABNORMAL (ref 9–20)
BUPRENORPHINE URINE: NORMAL
CALCIUM SERPL-MCNC: 9 MG/DL (ref 8.6–10.4)
CANNABINOID SCREEN URINE: NEGATIVE
CHLORIDE BLD-SCNC: 97 MMOL/L (ref 98–107)
CHOLESTEROL/HDL RATIO: 4.6
CHOLESTEROL: 217 MG/DL
CO2: 26 MMOL/L (ref 20–31)
COCAINE METABOLITE, URINE: NEGATIVE
CREAT SERPL-MCNC: 0.9 MG/DL (ref 0.5–0.9)
DIFFERENTIAL TYPE: ABNORMAL
EOSINOPHILS RELATIVE PERCENT: 2 % (ref 1–4)
ESTIMATED AVERAGE GLUCOSE: 120 MG/DL
FERRITIN: 33 UG/L (ref 13–150)
FOLATE: 17 NG/ML
GFR AFRICAN AMERICAN: >60 ML/MIN
GFR NON-AFRICAN AMERICAN: >60 ML/MIN
GFR SERPL CREATININE-BSD FRML MDRD: ABNORMAL ML/MIN/{1.73_M2}
GFR SERPL CREATININE-BSD FRML MDRD: ABNORMAL ML/MIN/{1.73_M2}
GLUCOSE BLD-MCNC: 90 MG/DL (ref 70–99)
HBA1C MFR BLD: 5.8 % (ref 4–6)
HCT VFR BLD CALC: 34.2 % (ref 36.3–47.1)
HDLC SERPL-MCNC: 47 MG/DL
HEMOGLOBIN: 10.7 G/DL (ref 11.9–15.1)
IMMATURE GRANULOCYTES: 0 %
IRON SATURATION: 19 % (ref 20–55)
IRON: 57 UG/DL (ref 37–145)
LDL CHOLESTEROL: 153 MG/DL (ref 0–130)
LYMPHOCYTES # BLD: 32 % (ref 24–43)
MAGNESIUM: 2 MG/DL (ref 1.6–2.6)
MCH RBC QN AUTO: 27.4 PG (ref 25.2–33.5)
MCHC RBC AUTO-ENTMCNC: 31.3 G/DL (ref 28.4–34.8)
MCV RBC AUTO: 87.5 FL (ref 82.6–102.9)
MDMA URINE: NORMAL
METHADONE SCREEN, URINE: NEGATIVE
METHAMPHETAMINE, URINE: NORMAL
MONOCYTES # BLD: 9 % (ref 3–12)
NRBC AUTOMATED: 0 PER 100 WBC
OPIATES, URINE: NEGATIVE
OXYCODONE SCREEN URINE: NEGATIVE
PDW BLD-RTO: 13 % (ref 11.8–14.4)
PHENCYCLIDINE, URINE: NEGATIVE
PLATELET # BLD: 427 K/UL (ref 138–453)
PLATELET ESTIMATE: ABNORMAL
PMV BLD AUTO: 9.5 FL (ref 8.1–13.5)
POTASSIUM SERPL-SCNC: 3.3 MMOL/L (ref 3.7–5.3)
PROPOXYPHENE, URINE: NORMAL
PTH INTACT: 55.85 PG/ML (ref 15–65)
RBC # BLD: 3.91 M/UL (ref 3.95–5.11)
RBC # BLD: ABNORMAL 10*6/UL
SEG NEUTROPHILS: 56 % (ref 36–65)
SEGMENTED NEUTROPHILS ABSOLUTE COUNT: 3.9 K/UL (ref 1.5–8.1)
SODIUM BLD-SCNC: 137 MMOL/L (ref 135–144)
TEST INFORMATION: NORMAL
THYROXINE, FREE: 1.11 NG/DL (ref 0.93–1.7)
TOTAL IRON BINDING CAPACITY: 298 UG/DL (ref 250–450)
TOTAL PROTEIN: 7.9 G/DL (ref 6.4–8.3)
TRICYCLIC ANTIDEPRESSANTS, UR: NORMAL
TRIGL SERPL-MCNC: 84 MG/DL
TSH SERPL DL<=0.05 MIU/L-ACNC: 0.6 MIU/L (ref 0.3–5)
UNSATURATED IRON BINDING CAPACITY: 241 UG/DL (ref 112–347)
VITAMIN B-12: >2000 PG/ML (ref 232–1245)
VITAMIN D 25-HYDROXY: 20.4 NG/ML (ref 30–100)
VLDLC SERPL CALC-MCNC: ABNORMAL MG/DL (ref 1–30)
WBC # BLD: 7.1 K/UL (ref 3.5–11.3)
WBC # BLD: ABNORMAL 10*3/UL

## 2020-07-03 PROCEDURE — 82746 ASSAY OF FOLIC ACID SERUM: CPT

## 2020-07-03 PROCEDURE — 83540 ASSAY OF IRON: CPT

## 2020-07-03 PROCEDURE — G0480 DRUG TEST DEF 1-7 CLASSES: HCPCS

## 2020-07-03 PROCEDURE — 83735 ASSAY OF MAGNESIUM: CPT

## 2020-07-03 PROCEDURE — 80053 COMPREHEN METABOLIC PANEL: CPT

## 2020-07-03 PROCEDURE — 84439 ASSAY OF FREE THYROXINE: CPT

## 2020-07-03 PROCEDURE — 84443 ASSAY THYROID STIM HORMONE: CPT

## 2020-07-03 PROCEDURE — 82607 VITAMIN B-12: CPT

## 2020-07-03 PROCEDURE — 85025 COMPLETE CBC W/AUTO DIFF WBC: CPT

## 2020-07-03 PROCEDURE — 84590 ASSAY OF VITAMIN A: CPT

## 2020-07-03 PROCEDURE — 36415 COLL VENOUS BLD VENIPUNCTURE: CPT

## 2020-07-03 PROCEDURE — 82306 VITAMIN D 25 HYDROXY: CPT

## 2020-07-03 PROCEDURE — 83550 IRON BINDING TEST: CPT

## 2020-07-03 PROCEDURE — 83970 ASSAY OF PARATHORMONE: CPT

## 2020-07-03 PROCEDURE — 71046 X-RAY EXAM CHEST 2 VIEWS: CPT

## 2020-07-03 PROCEDURE — 83036 HEMOGLOBIN GLYCOSYLATED A1C: CPT

## 2020-07-03 PROCEDURE — 80307 DRUG TEST PRSMV CHEM ANLYZR: CPT

## 2020-07-03 PROCEDURE — 80061 LIPID PANEL: CPT

## 2020-07-03 PROCEDURE — 82728 ASSAY OF FERRITIN: CPT

## 2020-07-03 PROCEDURE — 84425 ASSAY OF VITAMIN B-1: CPT

## 2020-07-03 PROCEDURE — 84630 ASSAY OF ZINC: CPT

## 2020-07-06 ENCOUNTER — NURSE ONLY (OUTPATIENT)
Dept: BARIATRICS/WEIGHT MGMT | Age: 40
End: 2020-07-06

## 2020-07-06 VITALS — BODY MASS INDEX: 39.17 KG/M2 | TEMPERATURE: 97.7 F | WEIGHT: 230 LBS

## 2020-07-06 LAB
3-OH-COTININE: <2 NG/ML
COTININE: <2 NG/ML
NICOTINE: <2 NG/ML
VITAMIN B1 WHOLE BLOOD: 43 NMOL/L (ref 70–180)
ZINC: 76.3 UG/DL (ref 60–120)

## 2020-07-06 NOTE — PROGRESS NOTES
Medical Nutrition Therapy  Initial Nutrition Assessment for Metabolic/ Bariatric Surgery  Required insurance visit prior to surgery:  6  Shared with patient the importance of documenting exercise and staying at or below start weight during visits. Pt reports:     Jose Benites is a 37 y.o. female with a date of birth of 1980. Vitals:    07/06/20 1302   Temp: 97.7 °F (36.5 °C)    BMI: Body mass index is 39.17 kg/m². Obesity Classification: Class II    Weight History: Wt Readings from Last 3 Encounters:   07/06/20 230 lb (104.3 kg)   06/17/20 229 lb (103.9 kg)   06/02/20 228 lb 3.2 oz (103.5 kg)        How does your weight affect your daily activities?joint pain, back pain, fatigue and short of breath with activity      What would be different in your life if you felt healthier and fit? Wants to have energy to be around for 2 little boys; wants to decrease high blood pressure medications      Why is that important to you now? Children are not getting any younger   Do you drink alcohol? . No    Do you use tobacco in the form of cigarettes, cigars, chew or any vapor appliance? No    Weight History      Sydney Perez's highest adult weight was  lbs at age . Patient was at her highest weight for     Patient's triggers/known causes to her highest weight are . Deborah Hodges lowest adult weight was  lbs at age . Patient was at her lowest weight for   The lowest weight was achieved through  . Physical Activity  Do you participate in a structured exercise program, step counting or regular physical activity? no      Instructions and exercise logs were provided to patient today see goal sheet and plan. Previous weight loss attempts  Patient has participated in the following weight loss programs:   Adipex, Slim Fast, Green Tea       Nutrition History  Have you ever been diagnosed with an eating disorder?  No  Have you ever had problems tolerating a multivitamin or mineral supplement?no  Have you ever been diagnosed with a vitamin or mineral deficiency? No    P Patient does not have Orthodoxy/cultural food concerns. Patient dines out to a sit down restaurant 3 times per week. Patient dines out to a fast food restaurant 1 times per day. Patient does not have grazing. Patient does not have night eating. Patient does not have a history of emotional eating. Patient does have a history of  eating out of boredom. Drinks throughout the day: Juice (2-3 cups/day), Coffee (1-2 large iced coffee/day)    24 hour recall/food frequency: has been scanned into chart unless completed below. Surgery  Patient's greatest concern about having surgery is: Not making it through. How will you know when you've been successful? As soon as she wakes from surgery   Patient has attended an information session where the long term changes of MBS were presented. Rating on a scale of 0-10 with 0 being none and 10 being the highest you have ever felt rate each of the three areas:    ability How confident are you that you can change now? 10   willingness How important is it for you to change at this time? 10   Readiness How ready are you to change at this time? 10             Assessment:  Nutritional Needs:  Men: 1500kcal daily minimum     Women 1200kcal daily minimum. 60-80gm of protein daily  PES Statement:  Obesity related to a complex combination of decreased energy needs, disordered eating patterns, physical inactivity, and increased psychological/life stress as evidenced by BMI Body mass index is 39.17 kg/m². and inability to maintain a significant amount of weight loss through conventional weight loss interventions. Goals    All goals were planned with and agreed on by the patient. I want to improve my health because the kids. appt # NA G What is your next step?   C 1 2 3 4 5 6 7 8 9     0  one I will read the education binder provided to me and the  Emotional eating checklist by my next visit. x 2 I will make my pschological evaluation appoinment. 0  3 I will bring this goal card to every appointment. x 4 I will eliminate all tobacco/nicotine. x 5 I will limit alcoholic beverages to 2-5IR per week. 6 I will limit dining out to 3 times per week or less. 7 I will eliminate sugary beverages. x 8 I will eliminate carbonated beverages. x 9 I will eliminate drinking with a straw. 10 I will limit caffeinated beverages to 16oz daily. x 11 I will limit cold cereals prepared with milk. 12 I will do a 5 minute reflection. 13 I will food journal daily. 0  14 I will log my exercise daily. 15 I will determine my  calcium and multivitamin plan. 16 I will purchase multivitamin. 17 I will start taking multivitamins following my plan. 18 I will have 1-2 servings of protein present at each meal.                 19 I will eat every 3-5 hours. 20 I will drink 64oz of fluid daily. 21 I will follow the 15-30-15 guideline. 22 I will eat protein first at all meals followed by vegetables  Fruit and lastly whole grains. 21 My first one diet neutral approach is:                 24 my second diet neutral approach is:                 25 My third diet neutral approach is:                     Do you understand your goals? y    Do you have the information you need to achieve your goals? y    Do you have any questions  right now? n        Plan    Exercise for Health 15 easy exercises to do at home with 6 activity logs were provided to the patient with verbal and written instructions on how to carry this out. Goal number 14 was provided to the patient on this visit please see above.   Will follow up each month and provide support as patient begins to add physical activity to life style. Monitor and review goals adjust as needed. Follow up monthly supervised diet and exercise.        Josefina Chandra

## 2020-07-07 ENCOUNTER — OFFICE VISIT (OUTPATIENT)
Dept: FAMILY MEDICINE CLINIC | Age: 40
End: 2020-07-07
Payer: COMMERCIAL

## 2020-07-07 VITALS
HEART RATE: 94 BPM | TEMPERATURE: 98.4 F | OXYGEN SATURATION: 98 % | WEIGHT: 232.8 LBS | SYSTOLIC BLOOD PRESSURE: 120 MMHG | BODY MASS INDEX: 39.65 KG/M2 | DIASTOLIC BLOOD PRESSURE: 82 MMHG

## 2020-07-07 PROBLEM — R00.0 TACHYCARDIA: Status: RESOLVED | Noted: 2020-06-02 | Resolved: 2020-07-07

## 2020-07-07 LAB
RETINYL PALMITATE: 0.04 MG/L (ref 0–0.1)
VITAMIN A LEVEL: 0.53 MG/L (ref 0.3–1.2)
VITAMIN A, INTERP: NORMAL

## 2020-07-07 PROCEDURE — G8417 CALC BMI ABV UP PARAM F/U: HCPCS | Performed by: NURSE PRACTITIONER

## 2020-07-07 PROCEDURE — 99213 OFFICE O/P EST LOW 20 MIN: CPT | Performed by: NURSE PRACTITIONER

## 2020-07-07 PROCEDURE — 1036F TOBACCO NON-USER: CPT | Performed by: NURSE PRACTITIONER

## 2020-07-07 PROCEDURE — G8427 DOCREV CUR MEDS BY ELIG CLIN: HCPCS | Performed by: NURSE PRACTITIONER

## 2020-07-07 RX ORDER — LOSARTAN POTASSIUM 100 MG/1
100 TABLET ORAL DAILY
Qty: 30 TABLET | Refills: 2 | Status: SHIPPED | OUTPATIENT
Start: 2020-07-07 | End: 2020-10-09

## 2020-07-07 RX ORDER — CHLORTHALIDONE 25 MG/1
25 TABLET ORAL DAILY
Qty: 30 TABLET | Refills: 5 | Status: SHIPPED | OUTPATIENT
Start: 2020-07-07 | End: 2021-01-14 | Stop reason: SDUPTHER

## 2020-07-07 RX ORDER — AMLODIPINE BESYLATE 5 MG/1
5 TABLET ORAL DAILY
Qty: 30 TABLET | Refills: 5 | Status: SHIPPED | OUTPATIENT
Start: 2020-07-07 | End: 2020-10-16 | Stop reason: SDUPTHER

## 2020-07-07 ASSESSMENT — ENCOUNTER SYMPTOMS
SINUS PAIN: 0
TROUBLE SWALLOWING: 0
CHOKING: 0
WHEEZING: 0
SHORTNESS OF BREATH: 0
VOMITING: 0
EYES NEGATIVE: 1
SORE THROAT: 0
COLOR CHANGE: 0
COUGH: 1
NAUSEA: 0
STRIDOR: 0
CHEST TIGHTNESS: 0
SINUS PRESSURE: 0

## 2020-07-07 NOTE — PATIENT INSTRUCTIONS
STOP LISINOPRIL  START LOSARTAN  CONTINUE MONITORING BLOOD PRESSURE / HR AT HOME   CALL ME WITH A LOG IN 4 WEEKS  FOLLOW-UP IN 3 MONTHS OR SOONER IF NEEDED

## 2020-07-07 NOTE — PROGRESS NOTES
Allergies      HPI:     HPI    Presents today c/o HTN / HR follow-up   Patient has not been monitoring HR at home as instructed   The past couple of visits w/in [facility] seems to have normalized  She did stop the elderberry, multivitamin and flaxseed supplements   She declines associated palpitations, dizziness, syncope, chest pain or dyspnea     Recently established with bariatric surgeon for plans with surgery in the future  She had labs drawn available in Epic unremarkable except hypokalemia     HTN - lisinopril , chlorthalidone , amlodipine   BP has been running 110-120's on average at home   C/o dry cough that seemed to start after Lisinopril RX  She does c/o intermittent ear fullness and PND which may attribute  Patient has not tried anything for symptoms     Health Maintenance:      Subjective:     Review of Systems   Constitutional: Negative. Negative for appetite change, chills, diaphoresis, fever and unexpected weight change. HENT: Positive for postnasal drip. Negative for congestion, ear discharge, ear pain, sinus pressure, sinus pain, sore throat and trouble swallowing. C/o bilateral ear fullness    Eyes: Negative. Respiratory: Positive for cough. Negative for choking, chest tightness, shortness of breath, wheezing and stridor. Cardiovascular: Negative. Gastrointestinal: Negative for nausea and vomiting. Declines GERD sx   Endocrine: Negative. Genitourinary: Negative. Negative for difficulty urinating. Musculoskeletal: Negative. Skin: Negative. Negative for color change, pallor, rash and wound. Allergic/Immunologic: Positive for environmental allergies. Neurological: Negative. Negative for dizziness, seizures, speech difficulty, weakness and headaches. Hematological: Negative. Psychiatric/Behavioral: Negative.         Objective:     Vitals:    07/07/20 1350   BP: 120/82   Pulse: 94   Temp: 98.4 °F (36.9 °C)   SpO2: 98%       Body mass index is 39.65 kg/m². Physical Exam  Constitutional:       General: She is not in acute distress. Appearance: She is well-developed. She is obese. She is not ill-appearing, toxic-appearing or diaphoretic. HENT:      Head: Normocephalic and atraumatic. Right Ear: External ear normal. There is impacted cerumen. Left Ear: Tympanic membrane, ear canal and external ear normal.      Nose: Nose normal.      Mouth/Throat:      Mouth: Mucous membranes are moist.      Pharynx: Oropharynx is clear. Eyes:      General: No scleral icterus. Right eye: No discharge. Left eye: No discharge. Conjunctiva/sclera: Conjunctivae normal.      Pupils: Pupils are equal, round, and reactive to light. Neck:      Musculoskeletal: Normal range of motion and neck supple. Trachea: No tracheal deviation. Cardiovascular:      Rate and Rhythm: Normal rate and regular rhythm. Heart sounds: Normal heart sounds. No murmur. No friction rub. No gallop. Pulmonary:      Effort: Pulmonary effort is normal. No tachypnea, accessory muscle usage or respiratory distress. Breath sounds: Normal breath sounds. No stridor. No decreased breath sounds, wheezing, rhonchi or rales. Abdominal:      Palpations: Abdomen is soft. Musculoskeletal: Normal range of motion. General: No tenderness or deformity. Skin:     General: Skin is warm and dry. Coloration: Skin is not pale. Findings: No erythema or rash. Neurological:      Mental Status: She is alert and oriented to person, place, and time. GCS: GCS eye subscore is 4. GCS verbal subscore is 5. GCS motor subscore is 6. Psychiatric:         Speech: Speech normal.         Behavior: Behavior normal.         Thought Content: Thought content normal.         Judgment: Judgment normal.           Assessment:         1. Essential hypertension    2. Hypokalemia    3. Cough due to ACE inhibitor        Plan:     1.  Essential hypertension    - losartan (COZAAR) 100 MG tablet; Take 1 tablet by mouth daily  Dispense: 30 tablet; Refill: 2  - amLODIPine (NORVASC) 5 MG tablet; Take 1 tablet by mouth daily  Dispense: 30 tablet; Refill: 5  - chlorthalidone (HYGROTON) 25 MG tablet; Take 1 tablet by mouth daily  Dispense: 30 tablet; Refill: 5    BP stable on current medications  Switch lisinopril to losartan due to cough s/e   Encouraged home BP monitoring juan with medication change   Labs up to date and reviewed  Call me in 4 weeks with HR/BP log  Follow-up in 3 months or sooner PRN     2. Hypokalemia    - Potassium; Future    Re-check labs , supplement if needed     3. Cough due to ACE inhibitor    Suspect ACE related  STOP ACE, start ARB   May also be secondary to upper airway syndrome  Nasocort samples given to use qd   F/u if cough persists     Discussed use, benefit, and side effects of prescribed medications. All patient questions answered. Pt voiced understanding. Reviewed health maintenance. Instructed to continue current medications, diet and exercise. Patient agreedwith treatment plan. Follow up as directed.      Electronically signed by LUZ Zaragoza CNP on 7/7/2020

## 2020-07-16 RX ORDER — THIAMINE MONONITRATE (VIT B1) 100 MG
100 TABLET ORAL DAILY
Qty: 30 TABLET | Refills: 1 | Status: SHIPPED | OUTPATIENT
Start: 2020-07-16 | End: 2020-09-17

## 2020-07-16 RX ORDER — ERGOCALCIFEROL 1.25 MG/1
50000 CAPSULE ORAL WEEKLY
Qty: 8 CAPSULE | Refills: 0 | Status: SHIPPED | OUTPATIENT
Start: 2020-07-16 | End: 2020-09-15 | Stop reason: ALTCHOICE

## 2020-07-17 ENCOUNTER — HOSPITAL ENCOUNTER (OUTPATIENT)
Dept: SLEEP CENTER | Age: 40
Discharge: HOME | End: 2020-07-19
Payer: COMMERCIAL

## 2020-07-17 VITALS — TEMPERATURE: 98.6 F

## 2020-07-17 PROCEDURE — 95810 POLYSOM 6/> YRS 4/> PARAM: CPT

## 2020-07-18 ENCOUNTER — HOSPITAL ENCOUNTER (OUTPATIENT)
Age: 40
Discharge: HOME | End: 2020-07-18
Payer: COMMERCIAL

## 2020-07-18 LAB — POTASSIUM SERPL-SCNC: 3.2 MMOL/L (ref 3.7–5.3)

## 2020-07-18 PROCEDURE — 36415 COLL VENOUS BLD VENIPUNCTURE: CPT

## 2020-07-18 PROCEDURE — 84132 ASSAY OF SERUM POTASSIUM: CPT

## 2020-07-19 RX ORDER — POTASSIUM CHLORIDE 20 MEQ/1
20 TABLET, EXTENDED RELEASE ORAL DAILY
Qty: 30 TABLET | Refills: 5 | Status: SHIPPED | OUTPATIENT
Start: 2020-07-19 | End: 2020-09-01

## 2020-07-27 ENCOUNTER — OFFICE VISIT (OUTPATIENT)
Dept: BARIATRICS/WEIGHT MGMT | Age: 40
End: 2020-07-27
Payer: COMMERCIAL

## 2020-07-27 VITALS
DIASTOLIC BLOOD PRESSURE: 82 MMHG | BODY MASS INDEX: 39.61 KG/M2 | HEART RATE: 72 BPM | HEIGHT: 64 IN | WEIGHT: 232 LBS | RESPIRATION RATE: 18 BRPM | SYSTOLIC BLOOD PRESSURE: 128 MMHG | TEMPERATURE: 98.4 F

## 2020-07-27 PROBLEM — E51.9 THIAMINE DEFICIENCY: Status: ACTIVE | Noted: 2020-07-27

## 2020-07-27 PROBLEM — K21.9 GASTROESOPHAGEAL REFLUX DISEASE WITHOUT ESOPHAGITIS: Status: ACTIVE | Noted: 2020-07-27

## 2020-07-27 PROBLEM — F41.9 ANXIETY: Status: ACTIVE | Noted: 2020-07-27

## 2020-07-27 PROBLEM — E66.9 OBESITY (BMI 30-39.9): Status: ACTIVE | Noted: 2020-04-16

## 2020-07-27 PROBLEM — R73.03 PREDIABETES: Status: ACTIVE | Noted: 2020-07-27

## 2020-07-27 PROCEDURE — 1036F TOBACCO NON-USER: CPT | Performed by: NURSE PRACTITIONER

## 2020-07-27 PROCEDURE — G8417 CALC BMI ABV UP PARAM F/U: HCPCS | Performed by: NURSE PRACTITIONER

## 2020-07-27 PROCEDURE — G8427 DOCREV CUR MEDS BY ELIG CLIN: HCPCS | Performed by: NURSE PRACTITIONER

## 2020-07-27 PROCEDURE — 99213 OFFICE O/P EST LOW 20 MIN: CPT | Performed by: NURSE PRACTITIONER

## 2020-07-27 NOTE — PROGRESS NOTES
strain: Not on file    Food insecurity     Worry: Not on file     Inability: Not on file    Transportation needs     Medical: Not on file     Non-medical: Not on file   Tobacco Use    Smoking status: Never Smoker    Smokeless tobacco: Never Used   Substance and Sexual Activity    Alcohol use: No     Alcohol/week: 0.0 standard drinks    Drug use: No    Sexual activity: Never     Partners: Male   Lifestyle    Physical activity     Days per week: Not on file     Minutes per session: Not on file    Stress: Not on file   Relationships    Social connections     Talks on phone: Not on file     Gets together: Not on file     Attends Nondenominational service: Not on file     Active member of club or organization: Not on file     Attends meetings of clubs or organizations: Not on file     Relationship status: Not on file    Intimate partner violence     Fear of current or ex partner: Not on file     Emotionally abused: Not on file     Physically abused: Not on file     Forced sexual activity: Not on file   Other Topics Concern    Not on file   Social History Narrative    Not on file       Current Medications:  Current Outpatient Medications   Medication Sig Dispense Refill    potassium chloride (KLOR-CON M) 20 MEQ extended release tablet Take 1 tablet by mouth daily 30 tablet 5    vitamin B-1 (THIAMINE) 100 MG tablet Take 1 tablet by mouth daily 30 tablet 1    vitamin D (ERGOCALCIFEROL) 1.25 MG (35486 UT) CAPS capsule Take 1 capsule by mouth once a week for 8 doses 8 capsule 0    losartan (COZAAR) 100 MG tablet Take 1 tablet by mouth daily 30 tablet 2    amLODIPine (NORVASC) 5 MG tablet Take 1 tablet by mouth daily 30 tablet 5    chlorthalidone (HYGROTON) 25 MG tablet Take 1 tablet by mouth daily 30 tablet 5    Garlic 10 MG CAPS Take 1 tablet by mouth daily 1 capsule 0    vitamin D3 (CHOLECALCIFEROL) 25 MCG (1000 UT) TABS tablet Take 1 tablet by mouth daily 30 tablet 0    hydrOXYzine (ATARAX) 25 MG tablet Take place, and time. Psychiatric: Normal mood and affect. Speech and behavior normal. Judgment and thought content normal. Cognition and memory intact. Assessment:       Diagnosis Orders   1. Essential hypertension     2. Gastroesophageal reflux disease without esophagitis     3. Anxiety     4. Obesity (BMI 30-39.9)     5. Prediabetes     6. Hyperlipidemia, unspecified hyperlipidemia type     7. Thiamine deficiency     8. Vitamin D deficiency     9. Hypokalemia         Plan:    Dietitian visit today. Patient was encouraged to journal all food intake. Keep calorie level at approximately 9368-1392. Protein intake is to be a minimum of 60-80 grams per day. Water drinking was encouraged with a goal of 64oz-128oz daily. Beverages to be calorie free except for milk. Every other beverage should be water. Avoid soda. Continue to increase level of physical activity. Encouraged use of exercise log. Follow-up  Return in about 1 month (around 8/27/2020). Orders this encounter:  No orders of the defined types were placed in this encounter. Prescriptions this encounter:  No orders of the defined types were placed in this encounter.       Electronically signed by:  Dayami Gentile CNP

## 2020-07-27 NOTE — PROGRESS NOTES
Medical Nutrition Therapy   Metabolic and Bariatric Surgery         Supervised diet and exercise preparation  Visit 1 out of 6  Pt reports:      Pt currently following structured meal plan 8pro/3veg/2fr/6 starch/3fat from education binder diet for weight management. Reviewed with pt. Vitals: Wt Readings from Last 3 Encounters:   07/27/20 232 lb (105.2 kg)   07/07/20 232 lb 12.8 oz (105.6 kg)   07/06/20 230 lb (104.3 kg)           Nutrition Assessment:   PES: Knowledge deficit related to healthy behaviors that support weight management post weight loss surgery as evidenced by Body mass index is 39.51 kg/m². Nutrition Assessment of Goal Attainment:  TREATMENT GOALS:    1. Pt  Completed 1 out of 3 goals. 2.TREATMENT GOALS FOR UPCOMING WEEK: continue all previous goals and add: # 23 & 24    All goals were planned with and agreed on by the patient. Goals    All goals were planned with and agreed on by the patient. I want to improve my health because the kids. appt # NA G What is your next step? C 1 2 3 4 5 6 7 8 9     1  one I will read the education binder provided to me and the  Emotional eating checklist by my next visit. 50              x 2 I will make my pschological evaluation appoinment. 100             1  3 I will bring this goal card to every appointment. x 4 I will eliminate all tobacco/nicotine. x 5 I will limit alcoholic beverages to 7-3IM per week. 6 I will limit dining out to 3 times per week or less. 7 I will eliminate sugary beverages. x 8 I will eliminate carbonated beverages. x 9 I will eliminate drinking with a straw. 10 I will limit caffeinated beverages to 16oz daily. x 11 I will limit cold cereals prepared with milk. 12 I will do a 5 minute reflection. 13 I will food journal daily.                1  14 I will log my exercise daily. 100               15 I will determine my  calcium and multivitamin plan. 16 I will purchase multivitamin. 17 I will start taking multivitamins following my plan. 18 I will have 1-2 servings of protein present at each meal.                 19 I will eat every 3-5 hours. 20 I will drink 64oz of fluid daily. 21 I will follow the 15-30-15 guideline. 22 I will eat protein first at all meals followed by vegetables  Fruit and lastly whole grains. 1  23 My first one diet neutral approach is:  I will transition to one small coffee daily               1  24 my second diet neutral approach is:  I will have a 10am minimeal.                 25 My third diet neutral approach is:                                                                        Do you understand your goals? y    Do you have the information you need to achieve your goals? y    Do you have any questions  right now? n        [x]  Consistent goal achievement in the program thus far and further success with goals is expected. []  Unable to consistently make progress in goal achievement. At this time patient is not moving forward  in developing the skills needed for success after surgery. Plan:    Continue to follow monthly and review goals.          [x]  Nutrition visits complete    []

## 2020-08-02 ENCOUNTER — HOSPITAL ENCOUNTER (OUTPATIENT)
Dept: PREADMISSION TESTING | Age: 40
Setting detail: SPECIMEN
Discharge: HOME | End: 2020-08-06
Payer: COMMERCIAL

## 2020-08-02 PROCEDURE — U0003 INFECTIOUS AGENT DETECTION BY NUCLEIC ACID (DNA OR RNA); SEVERE ACUTE RESPIRATORY SYNDROME CORONAVIRUS 2 (SARS-COV-2) (CORONAVIRUS DISEASE [COVID-19]), AMPLIFIED PROBE TECHNIQUE, MAKING USE OF HIGH THROUGHPUT TECHNOLOGIES AS DESCRIBED BY CMS-2020-01-R: HCPCS

## 2020-08-04 LAB — SARS-COV-2, NAA: NOT DETECTED

## 2020-08-05 ENCOUNTER — TELEPHONE (OUTPATIENT)
Dept: FAMILY MEDICINE CLINIC | Age: 40
End: 2020-08-05

## 2020-08-05 NOTE — TELEPHONE ENCOUNTER
CDC is leaning away from the test based strategy and recommend symptom based strategy for those who come into close contact w/ COVID + patient. The recommend RTW when below has been met :     At least 10 days have passed since symptoms first appeared and At least 24 hours have passed since last fever without the use of fever-reducing medications and Symptoms (e.g., cough, shortness of breath) have improved

## 2020-08-05 NOTE — TELEPHONE ENCOUNTER
Need more info to create a note. Refer to below. When was she exposed. Was it within 6 feet for > 15 minutes. Has she had any symptoms? Person Exposure to Recommended Precautions for the Public   Individual who has had close contact (< 6 feet) for ?15 minutes  Person with COVID-19 who has symptoms (in the period from 2 days before symptom onset until they meet criteria for discontinuing home isolation; can be laboratory-confirmed or a clinically compatible illness)   Person who has tested positive for COVID-19 (laboratory confirmed) but has not had any symptoms (in the 2 days before the date of specimen collection until they meet criteria for discontinuing home isolation).   Note: This is irrespective of whether the person with COVID-19 or the contact was wearing a mask or whether the contact was wearing respiratory personal protective equipment (PPE)  Stay home until 14 days after last exposure and maintain social distance (at least 6 feet) from others at all times   Self-monitor for symptoms   Check temperature twice a day   Watch for fever*, cough, or shortness of breath, or other symptoms of COVID-19  Avoid contact with people at higher risk for severe illness from COVID-19   Follow CDC guidance if symptoms develop

## 2020-08-05 NOTE — TELEPHONE ENCOUNTER
Pt was exposed to her mother  Who is positive for covid on 7/31/20. She was with her for more than 15 mins not 6 feet apart. She has had a cough for a few days, it has gotten a little better but is still there. She kept clearing her throat so I asked her if she had a sore throat and she said she just thinks it is irritated from the cough. , she says she is very sure she does not have a temp

## 2020-08-05 NOTE — TELEPHONE ENCOUNTER
Patient needs a return to work note. Pt's mother had covid, pt's work told her to stay home for 3 days and if she showed no symptoms she was to come back to work. She was tested 08/02/20 results negative.

## 2020-08-06 ENCOUNTER — HOSPITAL ENCOUNTER (OUTPATIENT)
Dept: NEUROLOGY | Age: 40
Discharge: HOME | End: 2020-08-06
Payer: COMMERCIAL

## 2020-08-06 PROCEDURE — 94729 DIFFUSING CAPACITY: CPT

## 2020-08-06 PROCEDURE — 94727 GAS DIL/WSHOT DETER LNG VOL: CPT

## 2020-08-06 PROCEDURE — 94060 EVALUATION OF WHEEZING: CPT

## 2020-08-06 RX ORDER — ALBUTEROL SULFATE 90 UG/1
2 AEROSOL, METERED RESPIRATORY (INHALATION) ONCE
Status: DISCONTINUED | OUTPATIENT
Start: 2020-08-06 | End: 2020-08-07 | Stop reason: HOSPADM

## 2020-08-14 ENCOUNTER — TELEPHONE (OUTPATIENT)
Dept: FAMILY MEDICINE CLINIC | Age: 40
End: 2020-08-14

## 2020-08-14 NOTE — TELEPHONE ENCOUNTER
It looks like this was ordered by bariatrics have her come in for an appointment and we can discuss what this means and appropriate treatment

## 2020-08-14 NOTE — TELEPHONE ENCOUNTER
Patient had PFT done 08/07/20 the person that did the test told pt she will need an inhaler.     PFT in [facility]

## 2020-08-18 ENCOUNTER — OFFICE VISIT (OUTPATIENT)
Dept: FAMILY MEDICINE CLINIC | Age: 40
End: 2020-08-18
Payer: COMMERCIAL

## 2020-08-18 VITALS
OXYGEN SATURATION: 98 % | TEMPERATURE: 98.3 F | DIASTOLIC BLOOD PRESSURE: 80 MMHG | HEART RATE: 111 BPM | BODY MASS INDEX: 39.68 KG/M2 | SYSTOLIC BLOOD PRESSURE: 122 MMHG | WEIGHT: 233 LBS

## 2020-08-18 PROCEDURE — 1036F TOBACCO NON-USER: CPT | Performed by: NURSE PRACTITIONER

## 2020-08-18 PROCEDURE — G8427 DOCREV CUR MEDS BY ELIG CLIN: HCPCS | Performed by: NURSE PRACTITIONER

## 2020-08-18 PROCEDURE — 99213 OFFICE O/P EST LOW 20 MIN: CPT | Performed by: NURSE PRACTITIONER

## 2020-08-18 PROCEDURE — G8417 CALC BMI ABV UP PARAM F/U: HCPCS | Performed by: NURSE PRACTITIONER

## 2020-08-18 RX ORDER — MONTELUKAST SODIUM 10 MG/1
10 TABLET ORAL DAILY
Qty: 30 TABLET | Refills: 3 | Status: SHIPPED | OUTPATIENT
Start: 2020-08-18 | End: 2021-01-07 | Stop reason: SDUPTHER

## 2020-08-18 ASSESSMENT — ENCOUNTER SYMPTOMS
COUGH: 0
STRIDOR: 0
VOMITING: 0
GASTROINTESTINAL NEGATIVE: 1
WHEEZING: 0
EYES NEGATIVE: 1
CHEST TIGHTNESS: 0
RESPIRATORY NEGATIVE: 1
COLOR CHANGE: 0
NAUSEA: 0
DIARRHEA: 0
CHOKING: 0
SHORTNESS OF BREATH: 0

## 2020-08-18 NOTE — PROGRESS NOTES
[facility]  [address]  Dept: [phone]    Rubi Rivera is a 37 y.o. female who presents today for her medical conditions/complaintsas noted below.       Rubi Rivera is here today c/o Results (PFT)    Past Medical History:   Diagnosis Date    Anxiety     GERD (gastroesophageal reflux disease)     Hyperlipidemia     Hypertension     Major depressive disorder, recurrent episode, mild ([facility] [address] 10/21/2015      Past Surgical History:   Procedure Laterality Date     SECTION      x1       Family History   Problem Relation Age of Onset    Hypertension Mother     Asthma Mother     Hypertension Maternal Aunt     Asthma Maternal Uncle     Hypertension Maternal Uncle     Arthritis Maternal Grandmother     Hypertension Maternal Grandmother     Stroke Maternal Grandmother     Breast Cancer Neg Hx     Cancer Neg Hx     Colon Cancer Neg Hx     Diabetes Neg Hx     Eclampsia Neg Hx     Ovarian Cancer Neg Hx      Labor Neg Hx     Spont Abortions Neg Hx        Social History     Tobacco Use    Smoking status: Never Smoker    Smokeless tobacco: Never Used   Substance Use Topics    Alcohol use: No     Alcohol/week: 0.0 standard drinks      Current Outpatient Medications   Medication Sig Dispense Refill    potassium chloride (KLOR-CON M) 20 MEQ extended release tablet Take 1 tablet by mouth daily 30 tablet 5    vitamin B-1 (THIAMINE) 100 MG tablet Take 1 tablet by mouth daily 30 tablet 1    vitamin D (ERGOCALCIFEROL) 1.25 MG (92395 UT) CAPS capsule Take 1 capsule by mouth once a week for 8 doses 8 capsule 0    losartan (COZAAR) 100 MG tablet Take 1 tablet by mouth daily 30 tablet 2    amLODIPine (NORVASC) 5 MG tablet Take 1 tablet by mouth daily 30 tablet 5    chlorthalidone (HYGROTON) 25 MG tablet Take 1 tablet by mouth daily 30 tablet 5    vitamin D3 (CHOLECALCIFEROL) 25 MCG (1000 UT) TABS tablet Take 1 tablet by mouth daily 30 tablet 0     No current facility-administered medications for this visit. No Known Allergies      HPI:     HPI    Presents today c/o follow-up PFT   She did have a cough that developed when pt started lisinopril which resolved with lisinopril discontinuation   Patient does complain of phlegm in throat and frequent throat clearing qhs   Declines any symptoms of dyspnea, wheezing, cough, or chest tightness   Declines any triggers including exercise, weather, allergens, etc.     Health Maintenance:      Subjective:     Review of Systems   Constitutional: Negative. Negative for appetite change, chills, diaphoresis, fever and unexpected weight change. HENT: Positive for postnasal drip. Eyes: Negative. Respiratory: Negative. Negative for cough, choking, chest tightness, shortness of breath, wheezing and stridor. Cardiovascular: Negative. Gastrointestinal: Negative. Negative for diarrhea, nausea and vomiting. Endocrine: Negative. Genitourinary: Negative. Musculoskeletal: Negative. Skin: Negative. Negative for color change, pallor, rash and wound. Allergic/Immunologic: Positive for environmental allergies. Neurological: Negative. Hematological: Negative. Psychiatric/Behavioral: Negative. Objective:     Vitals:    08/18/20 1550   BP: 122/80   Pulse: 111   Temp: 98.3 °F (36.8 °C)   SpO2: 98%       Body mass index is 39.68 kg/m². Physical Exam  Constitutional:       General: She is not in acute distress. Appearance: Normal appearance. She is well-developed. She is obese. She is not ill-appearing, toxic-appearing or diaphoretic. HENT:      Head: Normocephalic and atraumatic. Right Ear: External ear normal.      Left Ear: External ear normal.      Nose: Nose normal.   Eyes:      General: No scleral icterus. Right eye: No discharge. Left eye: No discharge.       Conjunctiva/sclera: Conjunctivae normal.      Pupils: Pupils are equal, round, and reactive to light.   Neck:      Musculoskeletal: Normal range of motion and neck supple. Trachea: No tracheal deviation. Cardiovascular:      Rate and Rhythm: Normal rate and regular rhythm. Heart sounds: Normal heart sounds. No murmur. No friction rub. No gallop. Pulmonary:      Effort: Pulmonary effort is normal. No tachypnea, accessory muscle usage or respiratory distress. Breath sounds: Normal breath sounds. No stridor. No decreased breath sounds, wheezing, rhonchi or rales. Abdominal:      Palpations: Abdomen is soft. Musculoskeletal: Normal range of motion. General: No tenderness or deformity. Skin:     General: Skin is warm and dry. Coloration: Skin is not pale. Findings: No erythema or rash. Neurological:      Mental Status: She is alert and oriented to person, place, and time. GCS: GCS eye subscore is 4. GCS verbal subscore is 5. GCS motor subscore is 6. Gait: Gait is intact. Psychiatric:         Speech: Speech normal.         Behavior: Behavior normal.         Thought Content: Thought content normal.         Judgment: Judgment normal.           Assessment:         1. Abnormal PFTs (pulmonary function tests)    2. Allergic rhinitis, unspecified seasonality, unspecified trigger        Plan:     1. Abnormal PFTs (pulmonary function tests)    - montelukast (SINGULAIR) 10 MG tablet; Take 1 tablet by mouth daily  Dispense: 30 tablet; Refill: 3    PFT reviewed   Patient declines any symptoms of asthma  She declines rescue inhaler trial   Will try on Singulair to assess for s/s relief   If fails try PPI for possible GERD related bronchospasms     2. Allergic rhinitis, unspecified seasonality, unspecified trigger    - montelukast (SINGULAIR) 10 MG tablet; Take 1 tablet by mouth daily  Dispense: 30 tablet; Refill: 3      Discussed use, benefit, and side effects of prescribed medications. All patient questions answered. Pt voiced understanding.  Reviewed health maintenance. Instructed to continue current medications, diet and exercise. Patient agreedwith treatment plan. Follow up as directed.      Electronically signed by LUZ Romo CNP on 8/18/2020

## 2020-08-20 LAB — STATUS: NORMAL

## 2020-08-28 ENCOUNTER — OFFICE VISIT (OUTPATIENT)
Dept: BARIATRICS/WEIGHT MGMT | Age: 40
End: 2020-08-28
Payer: COMMERCIAL

## 2020-08-28 VITALS
DIASTOLIC BLOOD PRESSURE: 78 MMHG | HEART RATE: 74 BPM | WEIGHT: 233 LBS | HEIGHT: 64 IN | TEMPERATURE: 97.9 F | SYSTOLIC BLOOD PRESSURE: 126 MMHG | RESPIRATION RATE: 20 BRPM | BODY MASS INDEX: 39.78 KG/M2

## 2020-08-28 PROBLEM — G47.33 OSA (OBSTRUCTIVE SLEEP APNEA): Status: ACTIVE | Noted: 2020-08-28

## 2020-08-28 PROCEDURE — 1036F TOBACCO NON-USER: CPT | Performed by: NURSE PRACTITIONER

## 2020-08-28 PROCEDURE — 99213 OFFICE O/P EST LOW 20 MIN: CPT | Performed by: NURSE PRACTITIONER

## 2020-08-28 PROCEDURE — G8427 DOCREV CUR MEDS BY ELIG CLIN: HCPCS | Performed by: NURSE PRACTITIONER

## 2020-08-28 PROCEDURE — G8417 CALC BMI ABV UP PARAM F/U: HCPCS | Performed by: NURSE PRACTITIONER

## 2020-08-28 NOTE — PROGRESS NOTES
for this visit. Vital Signs:  /78 (Site: Right Upper Arm, Position: Sitting, Cuff Size: Large Adult)   Pulse 74   Temp 97.9 °F (36.6 °C) (Infrared)   Resp 20   Ht 5' 4.25\" (1.632 m)   Wt 233 lb (105.7 kg)   BMI 39.68 kg/m²     BMI/Height/Weight:  Body mass index is 39.68 kg/m². Review of Systems - A review of systems was performed. All was negative unless otherwise documented in HPI. Constitutional: Negative for fever, chills and diaphoresis. HENT: Negative for hearing loss and trouble swallowing. Eyes: Negative for photophobia and visual disturbance. Respiratory: Negative for cough, shortness of breath and wheezing. Cardiovascular: Negative for chest pain and palpitations. Gastrointestinal: Negative for nausea, vomiting, abdominal pain, diarrhea, constipation, blood in stool and abdominal distention. Endocrine: Negative for polydipsia, polyphagia and polyuria. Genitourinary: Negative for dysuria, frequency, hematuria and difficulty urinating. Musculoskeletal: Negative for myalgias, joint swelling. Skin: Negative for pallor and rash. Neurological: Negative for dizziness, tremors, light-headedness and headaches. Psychiatric/Behavioral: Negative for sleep disturbance and dysphoric mood. Objective:      Physical Exam   Vital signs reviewed. General: Well-developed and well-nourished. No acute distress. Skin: Warm, dry and intact. HEENT: Normocephalic. EOMs intact. Conjunctivae normal. Neck supple. Cardiovascular: Normal rate, regular rhythm. Pulmonary/Chest: Normal effort. Lungs clear to auscultation. No rales, rhonchi or wheezing. Abdominal: Positive bowel sounds. Soft, nontender. Nondistended. Musculoskeletal: Movement x4. No edema. Neurological: Gait normal. Alert and oriented to person, place, and time. Psychiatric: Normal mood and affect. Speech and behavior normal. Judgment and thought content normal. Cognition and memory intact. Assessment:       Diagnosis Orders   1. Essential hypertension  External Referral To Psychology   2. Hyperlipidemia, unspecified hyperlipidemia type  External Referral To Psychology   3. Obesity (BMI 30-39. 9)  External Referral To Psychology   4. Gastroesophageal reflux disease without esophagitis  External Referral To Psychology   5. Anxiety  External Referral To Psychology   6. Prediabetes  External Referral To Psychology   7. TRISHA (obstructive sleep apnea)         Plan:    Dietitian visit today. Patient was encouraged to journal all food intake. Keep calorie level at approximately 9142-9315. Protein intake is to be a minimum of 60-80 grams per day. Water drinking was encouraged with a goal of 64oz-128oz daily. Beverages to be calorie free except for milk. Every other beverage should be water. Avoid soda. Continue to increase level of physical activity. Encouraged use of exercise log. Follow-up  Return in about 1 month (around 9/28/2020). Orders this encounter:  Orders Placed This Encounter   Procedures    External Referral To Psychology     Referral Priority:   Routine     Referral Type:   Eval and Treat     Referral Reason:   Specialty Services Required     Referred to Provider:   Kristi Stewart, PhD     Requested Specialty:   Psychology     Number of Visits Requested:   1       Prescriptions this encounter:  No orders of the defined types were placed in this encounter.       Electronically signed by:  Ernesto Walker CNP

## 2020-08-28 NOTE — PROGRESS NOTES
Medical Nutrition Therapy   Metabolic and Bariatric Surgery         Supervised diet and exercise preparation  Visit 2 out of 6  Pt reports:      Pt currently following structured meal plan 8pro/3veg/2fr/6 starch/3fat from education binder diet for weight management. Reviewed with pt. Vitals: Wt Readings from Last 3 Encounters:   08/28/20 233 lb (105.7 kg)   08/18/20 233 lb (105.7 kg)   07/27/20 232 lb (105.2 kg)           Nutrition Assessment:   PES: Knowledge deficit related to healthy behaviors that support weight management post weight loss surgery as evidenced by Body mass index is 39.68 kg/m². Nutrition Assessment of Goal Attainment:  TREATMENT GOALS:    1. Pt  Completed 4 out of 5 goals. 2.TREATMENT GOALS FOR UPCOMING WEEK: continue all previous goals and add: # 6    All goals were planned with and agreed on by the patient. Goals    All goals were planned with and agreed on by the patient. I want to improve my health because the kids. appt # NA G What is your next step? C 1 2 3 4 5 6 7 8 9     2  one I will read the education binder provided to me and the  Emotional eating checklist by my next visit. 50 50             x 2 I will make my pschological evaluation appoinment. 100             2  3 I will bring this goal card to every appointment. 100             x 4 I will eliminate all tobacco/nicotine. x 5 I will limit alcoholic beverages to 7-5IW per week. 2  6 I will limit dining out to 3 times per week or less. x 7 I will eliminate sugary beverages. x 8 I will eliminate carbonated beverages. x 9 I will eliminate drinking with a straw. 10 I will limit caffeinated beverages to 16oz daily. x 11 I will limit cold cereals prepared with milk. 12 I will do a 5 minute reflection. 13 I will food journal daily. 2  14 I will log my exercise daily.

## 2020-08-29 ENCOUNTER — HOSPITAL ENCOUNTER (OUTPATIENT)
Age: 40
Discharge: HOME | End: 2020-08-29
Payer: COMMERCIAL

## 2020-08-29 LAB
POTASSIUM SERPL-SCNC: 3.2 MMOL/L (ref 3.7–5.3)
TSH SERPL DL<=0.05 MIU/L-ACNC: 0.73 MIU/L (ref 0.3–5)

## 2020-08-29 PROCEDURE — 84132 ASSAY OF SERUM POTASSIUM: CPT

## 2020-08-29 PROCEDURE — 84443 ASSAY THYROID STIM HORMONE: CPT

## 2020-08-29 PROCEDURE — 36415 COLL VENOUS BLD VENIPUNCTURE: CPT

## 2020-09-01 RX ORDER — POTASSIUM CHLORIDE 20 MEQ/1
40 TABLET, EXTENDED RELEASE ORAL DAILY
Qty: 60 TABLET | Refills: 5 | Status: SHIPPED | OUTPATIENT
Start: 2020-09-01 | End: 2021-01-07 | Stop reason: SDUPTHER

## 2020-09-15 ENCOUNTER — OFFICE VISIT (OUTPATIENT)
Dept: BARIATRICS/WEIGHT MGMT | Age: 40
End: 2020-09-15
Payer: COMMERCIAL

## 2020-09-15 VITALS
HEIGHT: 64 IN | BODY MASS INDEX: 39.78 KG/M2 | DIASTOLIC BLOOD PRESSURE: 80 MMHG | SYSTOLIC BLOOD PRESSURE: 128 MMHG | HEART RATE: 80 BPM | RESPIRATION RATE: 18 BRPM | WEIGHT: 233 LBS

## 2020-09-15 PROCEDURE — 99213 OFFICE O/P EST LOW 20 MIN: CPT | Performed by: NURSE PRACTITIONER

## 2020-09-15 PROCEDURE — G8417 CALC BMI ABV UP PARAM F/U: HCPCS | Performed by: NURSE PRACTITIONER

## 2020-09-15 PROCEDURE — G8427 DOCREV CUR MEDS BY ELIG CLIN: HCPCS | Performed by: NURSE PRACTITIONER

## 2020-09-15 PROCEDURE — 1036F TOBACCO NON-USER: CPT | Performed by: NURSE PRACTITIONER

## 2020-09-15 NOTE — PROGRESS NOTES
Medical Weight Management Progress Note    Subjective      Patient being seen for medically supervised weight loss for the chronic conditions of HTN, GERD, Anxiety, Prediabetes, HLD. She is working on the behavior changes discussed at the initial appointment. Patient continues on diet plan. Physical activity includes walking. Weight loss of 0 lbs since last visit. Using CPAP. Needs to schedule psych eval and EGD. Working on cardiac and pulmonary clearances. No current issues. Working toward bariatric surgery:    [x] Sleeve Gastrectomy                                                           [] Deepak-en-Y Gastric Bypass    Allergies:  No Known Allergies    Past Medical History:     Past Medical History:   Diagnosis Date    Anxiety     GERD (gastroesophageal reflux disease)     Hyperlipidemia     Hypertension     Major depressive disorder, recurrent episode, mild ([facility] [address] 10/21/2015   .     Past Surgical History:  Past Surgical History:   Procedure Laterality Date     SECTION      x1       Family History:  Family History   Problem Relation Age of Onset    Hypertension Mother     Asthma Mother     Hypertension Maternal Aunt     Asthma Maternal Uncle     Hypertension Maternal Uncle     Arthritis Maternal Grandmother     Hypertension Maternal Grandmother     Stroke Maternal Grandmother     Breast Cancer Neg Hx     Cancer Neg Hx     Colon Cancer Neg Hx     Diabetes Neg Hx     Eclampsia Neg Hx     Ovarian Cancer Neg Hx      Labor Neg Hx     Spont Abortions Neg Hx        Social History:  Social History     Socioeconomic History    Marital status: Single     Spouse name: Not on file    Number of children: Not on file    Years of education: Not on file    Highest education level: Not on file   Occupational History    Not on file   Social Needs    Financial resource strain: Not on file    Food insecurity     Worry: Not on file     Inability: Not on file   [facility] Transportation needs     Medical: Not on file     Non-medical: Not on file   Tobacco Use    Smoking status: Never Smoker    Smokeless tobacco: Never Used   Substance and Sexual Activity    Alcohol use: No     Alcohol/week: 0.0 standard drinks    Drug use: No    Sexual activity: Never     Partners: Male   Lifestyle    Physical activity     Days per week: Not on file     Minutes per session: Not on file    Stress: Not on file   Relationships    Social connections     Talks on phone: Not on file     Gets together: Not on file     Attends [employer] service: Not on file     Active member of club or organization: Not on file     Attends meetings of clubs or organizations: Not on file     Relationship status: Not on file    Intimate partner violence     Fear of current or ex partner: Not on file     Emotionally abused: Not on file     Physically abused: Not on file     Forced sexual activity: Not on file   Other Topics Concern    Not on file   Social History Narrative    Not on file       Current Medications:  Current Outpatient Medications   Medication Sig Dispense Refill    potassium chloride (KLOR-CON M) 20 MEQ extended release tablet Take 2 tablets by mouth daily 60 tablet 5    Metoprolol Succinate 25 MG CS24 Take by mouth 1/2 tab daily      montelukast (SINGULAIR) 10 MG tablet Take 1 tablet by mouth daily 30 tablet 3    vitamin B-1 (THIAMINE) 100 MG tablet Take 1 tablet by mouth daily 30 tablet 1    losartan (COZAAR) 100 MG tablet Take 1 tablet by mouth daily 30 tablet 2    amLODIPine (NORVASC) 5 MG tablet Take 1 tablet by mouth daily 30 tablet 5    chlorthalidone (HYGROTON) 25 MG tablet Take 1 tablet by mouth daily 30 tablet 5     No current facility-administered medications for this visit.         Vital Signs:  /80 (Site: Right Upper Arm, Position: Sitting, Cuff Size: Large Adult)   Pulse 80   Resp 18   Ht 5' 4.25\" (1.632 m)   Wt 233 lb (105.7 kg)   BMI 39.68 kg/m² BMI/Height/Weight:  Body mass index is 39.68 kg/m². Review of Systems - A review of systems was performed. All was negative unless otherwise documented in HPI. Constitutional: Negative for fever, chills and diaphoresis. HENT: Negative for hearing loss and trouble swallowing. Eyes: Negative for photophobia and visual disturbance. Respiratory: Negative for cough, shortness of breath and wheezing. Cardiovascular: Negative for chest pain and palpitations. Gastrointestinal: Negative for nausea, vomiting, abdominal pain, diarrhea, constipation, blood in stool and abdominal distention. Endocrine: Negative for polydipsia, polyphagia and polyuria. Genitourinary: Negative for dysuria, frequency, hematuria and difficulty urinating. Musculoskeletal: Negative for myalgias, joint swelling. Skin: Negative for pallor and rash. Neurological: Negative for dizziness, tremors, light-headedness and headaches. Psychiatric/Behavioral: Negative for sleep disturbance and dysphoric mood. Objective:      Physical Exam   Vital signs reviewed. General: Well-developed and well-nourished. No acute distress. Skin: Warm, dry and intact. HEENT: Normocephalic. EOMs intact. Conjunctivae normal. Neck supple. Cardiovascular: Normal rate, regular rhythm. Pulmonary/Chest: Normal effort. Lungs clear to auscultation. No rales, rhonchi or wheezing. Abdominal: Positive bowel sounds. Soft, nontender. Nondistended. Musculoskeletal: Movement x4. No edema. Neurological: Gait normal. Alert and oriented to person, place, and time. Psychiatric: Normal mood and affect. Speech and behavior normal. Judgment and thought content normal. Cognition and memory intact. Assessment:       Diagnosis Orders   1. TRISHA (obstructive sleep apnea)  Vitamin B1   2. Hyperlipidemia, unspecified hyperlipidemia type  Vitamin B1   3. Essential hypertension  Vitamin B1   4.  Gastroesophageal reflux disease without esophagitis  Vitamin

## 2020-09-15 NOTE — PROGRESS NOTES
Medical Nutrition Therapy   Metabolic and Bariatric Surgery         Supervised diet and exercise preparation  Visit 3 out of 6  Pt reports:      Pt currently following structured meal plan 8pro/3veg/2fr/6 starch/3fat from education binder diet for weight management. Reviewed with pt. Vitals: Wt Readings from Last 3 Encounters:   09/15/20 233 lb (105.7 kg)   08/28/20 233 lb (105.7 kg)   08/18/20 233 lb (105.7 kg)           Nutrition Assessment:   PES: Knowledge deficit related to healthy behaviors that support weight management post weight loss surgery as evidenced by Body mass index is 39.68 kg/m². Nutrition Assessment of Goal Attainment:  TREATMENT GOALS:    1. Pt  Completed 1 out of 3 goals. 2.TREATMENT GOALS FOR UPCOMING WEEK: continue all previous goals and add: # 15    All goals were planned with and agreed on by the patient. I want to improve my health because the kids. appt # NA G What is your next step? C 1 2 3 4 5 6 7 8 9     3  one I will read the education binder provided to me and the  Emotional eating checklist by my next visit. 50 50 ?            x 2 I will make my pschological evaluation appoinment. 100             3  3 I will bring this goal card to every appointment. 100 0            x 4 I will eliminate all tobacco/nicotine. x 5 I will limit alcoholic beverages to 8-8QU per week. 3  6 I will limit dining out to 3 times per week or less. ?            x 7 I will eliminate sugary beverages. x 8 I will eliminate carbonated beverages. x 9 I will eliminate drinking with a straw. 10 I will limit caffeinated beverages to 16oz daily. x 11 I will limit cold cereals prepared with milk. 12 I will do a 5 minute reflection. 13 I will food journal daily. 3  14 I will log my exercise daily.   100  0           3  15 I will determine my  calcium and multivitamin plan. 16 I will purchase multivitamin. 17 I will start taking multivitamins following my plan. 18 I will have 1-2 servings of protein present at each meal.                 19 I will eat every 3-5 hours. 20 I will drink 64oz of fluid daily. 21 I will follow the 15-30-15 guideline. 22 I will eat protein first at all meals followed by vegetables  Fruit and lastly whole grains. 3  23 My first one diet neutral approach is:  I will transition to one small coffee daily    ? 3  24 my second diet neutral approach is:  I will have a 10am minimeal.    ?             25 My third diet neutral approach is:                                                                                                                                                                                  Do you understand your goals? y    Do you have the information you need to achieve your goals? y    Do you have any questions  right now? n        [x]  Consistent goal achievement in the program thus far and further success with goals is expected. []  Unable to consistently make progress in goal achievement. At this time patient is not moving forward  in developing the skills needed for success after surgery. Plan:    Continue to follow monthly and review goals.          [x]  Nutrition visits complete    []

## 2020-09-17 ENCOUNTER — TELEPHONE (OUTPATIENT)
Dept: BARIATRICS/WEIGHT MGMT | Age: 40
End: 2020-09-17

## 2020-09-17 RX ORDER — THIAMINE HCL 100 MG
TABLET ORAL
Qty: 30 TABLET | Refills: 0 | Status: SHIPPED | OUTPATIENT
Start: 2020-09-17 | End: 2020-12-04

## 2020-09-17 NOTE — TELEPHONE ENCOUNTER
Pt reports her clearance is done but cardiologist would like a request for clearance from us faxed to [phone]; advise [id number]

## 2020-10-08 ENCOUNTER — TELEMEDICINE (OUTPATIENT)
Dept: FAMILY MEDICINE CLINIC | Age: 40
End: 2020-10-08
Payer: COMMERCIAL

## 2020-10-08 PROCEDURE — G8427 DOCREV CUR MEDS BY ELIG CLIN: HCPCS | Performed by: NURSE PRACTITIONER

## 2020-10-08 PROCEDURE — 99213 OFFICE O/P EST LOW 20 MIN: CPT | Performed by: NURSE PRACTITIONER

## 2020-10-08 ASSESSMENT — ENCOUNTER SYMPTOMS
COUGH: 0
GASTROINTESTINAL NEGATIVE: 1
ALLERGIC/IMMUNOLOGIC NEGATIVE: 1
DIARRHEA: 0
EYES NEGATIVE: 1
COLOR CHANGE: 0
VOMITING: 0
SHORTNESS OF BREATH: 0
NAUSEA: 0
RESPIRATORY NEGATIVE: 1

## 2020-10-08 NOTE — PROGRESS NOTES
[facility]  [address] [place] [address]  Dept: [phone]      Cristal Herron is a 37 y.o. female who presents today for her medical conditions/complaintsas noted below.       Cristal Herron is here today c/o Hypertension    Past Medical History:   Diagnosis Date    Anxiety     GERD (gastroesophageal reflux disease)     Hyperlipidemia     Hypertension     Major depressive disorder, recurrent episode, mild ([address] 10/21/2015      Past Surgical History:   Procedure Laterality Date     SECTION      x1       Family History   Problem Relation Age of Onset    Hypertension Mother     Asthma Mother     Hypertension Maternal Aunt     Asthma Maternal Uncle     Hypertension Maternal Uncle     Arthritis Maternal Grandmother     Hypertension Maternal Grandmother     Stroke Maternal Grandmother     Breast Cancer Neg Hx     Cancer Neg Hx     Colon Cancer Neg Hx     Diabetes Neg Hx     Eclampsia Neg Hx     Ovarian Cancer Neg Hx      Labor Neg Hx     Spont Abortions Neg Hx        Social History     Tobacco Use    Smoking status: Never Smoker    Smokeless tobacco: Never Used   Substance Use Topics    Alcohol use: No     Alcohol/week: 0.0 standard drinks      Current Outpatient Medications   Medication Sig Dispense Refill    vitamin B-1 (THIAMINE) 100 MG TABS tablet Take 1 tablet by mouth once daily 30 tablet 0    potassium chloride (KLOR-CON M) 20 MEQ extended release tablet Take 2 tablets by mouth daily 60 tablet 5    Metoprolol Succinate 25 MG CS24 Take by mouth 1/2 tab daily      montelukast (SINGULAIR) 10 MG tablet Take 1 tablet by mouth daily 30 tablet 3    losartan (COZAAR) 100 MG tablet Take 1 tablet by mouth daily 30 tablet 2    amLODIPine (NORVASC) 5 MG tablet Take 1 tablet by mouth daily 30 tablet 5    chlorthalidone (HYGROTON) 25 MG tablet Take 1 tablet by mouth daily 30 tablet 5     No current facility-administered medications for this visit. No Known Allergies      HPI:     HPI    Presents today c/o HTN follow-up  Taking chlorthalidone , amlodipine, losartan, metoprolol (per Cardiology)   Blood pressure running around 130 / 80's at home on average   Declines any medication side effects   Declines any chest pain, palpitations, dizziness, dyspnea, headaches etc.  Hasn't been exercising regularly , discussed     Planning on bariatric surgery early next year   Has 2 months left of the program    Getting flu shot through work today     Health Maintenance:      Subjective:     Review of Systems   Constitutional: Negative. Negative for appetite change, chills, diaphoresis, fatigue, fever and unexpected weight change. HENT: Negative. Eyes: Negative. Respiratory: Negative. Negative for cough and shortness of breath. Cardiovascular: Negative. Negative for chest pain and palpitations. Gastrointestinal: Negative. Negative for diarrhea, nausea and vomiting. Endocrine: Negative. Genitourinary: Negative. Negative for difficulty urinating. Musculoskeletal: Negative. Skin: Negative. Negative for color change, pallor, rash and wound. Allergic/Immunologic: Negative. Neurological: Negative. Negative for dizziness, seizures, syncope, speech difficulty, weakness, light-headedness and headaches. Hematological: Negative. Psychiatric/Behavioral: Negative. Objective:     Patient-Reported Vitals 10/8/2020   Patient-Reported Systolic 808   Patient-Reported Diastolic 83   Patient-Reported Pulse 101      Patient-Reported Vitals 10/8/2020   Patient-Reported Systolic 327   Patient-Reported Diastolic 87   Patient-Reported Pulse -        Physical Exam  Constitutional:       General: She is not in acute distress. Appearance: Normal appearance. She is obese. She is not ill-appearing, toxic-appearing or diaphoretic. HENT:      Head: Normocephalic and atraumatic.       Right Ear: External ear normal.      Left Ear: External ear normal.      Nose: Nose normal.      Mouth/Throat:      Mouth: Mucous membranes are moist.      Pharynx: Oropharynx is clear. Eyes:      General: No scleral icterus. Right eye: No discharge. Left eye: No discharge. Conjunctiva/sclera: Conjunctivae normal.   Neck:      Musculoskeletal: Normal range of motion and neck supple. Cardiovascular:      Rate and Rhythm: Normal rate. Pulmonary:      Effort: Pulmonary effort is normal. No respiratory distress. Breath sounds: No stridor. Musculoskeletal: Normal range of motion. Skin:     General: Skin is warm and dry. Coloration: Skin is not jaundiced or pale. Findings: No bruising, erythema, lesion or rash. Neurological:      Mental Status: She is alert and oriented to person, place, and time. Gait: Gait is intact. Psychiatric:         Mood and Affect: Mood normal.         Behavior: Behavior normal.         Thought Content: Thought content normal.         Judgment: Judgment normal.           Assessment:         1. Essential hypertension    2. Hypokalemia    3. Obesity (BMI 30-39. 9)        Plan:     1. Essential hypertension    BP stable at goal  Labs up to date and reviewed   Continue medications as prescribed  F/u 6 months or sooner if needed     2. Hypokalemia    Continue K supplement  Update labs     3. Obesity (BMI 30-39. 9)    Continue to follow w/ Bariatrics   Encouraged regularly exercise 150 minutes weekly     Brent Pump is a 37 y.o. female being evaluated by a Virtual Visit (video visit) encounter to address concerns as mentioned above. A caregiver was present when appropriate. Due to this being a TeleHealth encounter (During [facility]- public health emergency), evaluation of the following organ systems was limited: Vitals/Constitutional/EENT/Resp/CV/GI//MS/Neuro/Skin/Heme-Lymph-Imm.   Pursuant to the emergency declaration under the [facility] waiver authority and the Coronavirus Preparedness and Response Supplemental Appropriations Act, this Virtual Visit was conducted with patient's (and/or legal guardian's) consent, to reduce the patient's risk of exposure to COVID-19 and provide necessary medical care. The patient (and/or legal guardian) has also been advised to contact this office for worsening conditions or problems, and seek emergency medical treatment and/or call 911 if deemed necessary. Patient identification was verified at the start of the visit: Yes    Total time spent for this encounter: Not billed by time    Services were provided through a video synchronous discussion virtually to substitute for in-person clinic visit. Patient and provider were located at their individual homes. --LUZ Martinez CNP on 10/8/2020 at 10:44 AM    An electronic signature was used to authenticate this note. Discussed use, benefit, and side effects of prescribed medications. All patient questions answered. Pt voiced understanding. Reviewed health maintenance. Instructed to continue current medications, diet and exercise. Patient agreedwith treatment plan. Follow up as directed.      Electronically signed by LUZ Martinez CNP on 10/8/2020

## 2020-10-15 ENCOUNTER — OFFICE VISIT (OUTPATIENT)
Dept: BARIATRICS/WEIGHT MGMT | Age: 40
End: 2020-10-15
Payer: COMMERCIAL

## 2020-10-15 VITALS
HEART RATE: 80 BPM | DIASTOLIC BLOOD PRESSURE: 74 MMHG | RESPIRATION RATE: 18 BRPM | HEIGHT: 64 IN | SYSTOLIC BLOOD PRESSURE: 126 MMHG | BODY MASS INDEX: 40.46 KG/M2 | WEIGHT: 237 LBS

## 2020-10-15 PROBLEM — E66.01 OBESITY, CLASS III, BMI 40-49.9 (MORBID OBESITY) (HCC): Status: ACTIVE | Noted: 2020-10-15

## 2020-10-15 PROBLEM — E66.813 OBESITY, CLASS III, BMI 40-49.9 (MORBID OBESITY): Status: ACTIVE | Noted: 2020-10-15

## 2020-10-15 PROCEDURE — G8427 DOCREV CUR MEDS BY ELIG CLIN: HCPCS | Performed by: NURSE PRACTITIONER

## 2020-10-15 PROCEDURE — 1036F TOBACCO NON-USER: CPT | Performed by: NURSE PRACTITIONER

## 2020-10-15 PROCEDURE — G8484 FLU IMMUNIZE NO ADMIN: HCPCS | Performed by: NURSE PRACTITIONER

## 2020-10-15 PROCEDURE — G8417 CALC BMI ABV UP PARAM F/U: HCPCS | Performed by: NURSE PRACTITIONER

## 2020-10-15 PROCEDURE — 99213 OFFICE O/P EST LOW 20 MIN: CPT | Performed by: NURSE PRACTITIONER

## 2020-10-15 NOTE — PROGRESS NOTES
Medical Nutrition Therapy   Metabolic and Bariatric Surgery         Supervised diet and exercise preparation  Visit 4 out of 6  Pt reports:      Pt currently following structured meal plan 8pro/3veg/2fr/6 starch/3fat from education binder diet for weight management. Reviewed with pt. Vitals: Wt Readings from Last 3 Encounters:   10/15/20 237 lb (107.5 kg)   09/15/20 233 lb (105.7 kg)   08/28/20 233 lb (105.7 kg)     gained 4 lbs over 1 month. Nutrition Assessment:   PES: Knowledge deficit related to healthy behaviors that support weight management post weight loss surgery as evidenced by Body mass index is 40.36 kg/m². Nutrition Assessment of Goal Attainment:  TREATMENT GOALS:    1. Pt  Completed 6 out of 6 goals. 2.TREATMENT GOALS FOR UPCOMING WEEK: continue all previous goals and add: # 18, 19, 20    All goals were planned with and agreed on by the patient. I want to improve my health because the kids. appt # NA G What is your next step? C 1 2 3 4 5 6 7 8 9     3  one I will read the education binder provided to me and the  Emotional eating checklist by my next visit. 50 50 ?            x 2 I will make my pschological evaluation appoinment. 100             4  3 I will bring this goal card to every appointment. 100 0 100           x 4 I will eliminate all tobacco/nicotine. x 5 I will limit alcoholic beverages to 5-6EH per week. 3  6 I will limit dining out to 3 times per week or less. x   ? 100           x 7 I will eliminate sugary beverages. x 8 I will eliminate carbonated beverages. x 9 I will eliminate drinking with a straw. 10 I will limit caffeinated beverages to 16oz daily. x               x 11 I will limit cold cereals prepared with milk. 12 I will do a 5 minute reflection. 13 I will food journal daily. 4  14 I will log my exercise daily.   100  0 100 3  15 I will determine my  calcium and multivitamin plan. procare x    100            16 I will purchase multivitamin. 17 I will start taking multivitamins following my plan. 4  18 I will have 1-2 servings of protein present at each meal.               4  19 I will eat every 3-5 hours. 4  20 I will drink 64oz of fluid daily. 21 I will follow the 15-30-15 guideline. 22 I will eat protein first at all meals followed by vegetables  Fruit and lastly whole grains. 3  23 My first one diet neutral approach is:  I will transition to one small coffee daily x   ? 100          3  24 my second diet neutral approach is:  I will have a 10am minimeal.    ? 100            25 My third diet neutral approach is:                                                                        Do you understand your goals? y    Do you have the information you need to achieve your goals? y    Do you have any questions  right now? n        [x]  Consistent goal achievement in the program thus far and further success with goals is expected. []  Unable to consistently make progress in goal achievement. At this time patient is not moving forward  in developing the skills needed for success after surgery. Plan:    Continue to follow monthly and review goals.          [x]  Nutrition visits complete    []

## 2020-10-15 NOTE — PROGRESS NOTES
Medical Weight Management Progress Note    Subjective      Patient being seen for medically supervised weight loss for the chronic conditions of HTN, GERD, Anxiety, Prediabetes, HLD. She is working on the behavior changes discussed at the initial appointment. Patient continues on diet plan. Physical activity includes walking. Weight gain of 4 lbs since last visit. Retaining fluid. Using CPAP. Psych eval scheduled in 2021 and EGD scheduled 21. Working on cardiac and pulmonary clearances. No current issues. Working toward bariatric surgery:    [x] Sleeve Gastrectomy                                                           [] Deepak-en-Y Gastric Bypass    Allergies:  No Known Allergies    Past Medical History:     Past Medical History:   Diagnosis Date    Anxiety     GERD (gastroesophageal reflux disease)     Hyperlipidemia     Hypertension     Major depressive disorder, recurrent episode, mild ([facility] 10/21/2015   .     Past Surgical History:  Past Surgical History:   Procedure Laterality Date     SECTION      x1       Family History:  Family History   Problem Relation Age of Onset    Hypertension Mother     Asthma Mother     Hypertension Maternal Aunt     Asthma Maternal Uncle     Hypertension Maternal Uncle     Arthritis Maternal Grandmother     Hypertension Maternal Grandmother     Stroke Maternal Grandmother     Breast Cancer Neg Hx     Cancer Neg Hx     Colon Cancer Neg Hx     Diabetes Neg Hx     Eclampsia Neg Hx     Ovarian Cancer Neg Hx      Labor Neg Hx     Spont Abortions Neg Hx        Social History:  Social History     Socioeconomic History    Marital status: Single     Spouse name: Not on file    Number of children: Not on file    Years of education: Not on file    Highest education level: Not on file   Occupational History    Not on file   Social Needs    Financial resource strain: Not on file    Food insecurity     Worry: Not on file BMI 40.36 kg/m²     BMI/Height/Weight:  Body mass index is 40.36 kg/m². Review of Systems - A review of systems was performed. All was negative unless otherwise documented in HPI. Constitutional: Negative for fever, chills and diaphoresis. HENT: Negative for hearing loss and trouble swallowing. Eyes: Negative for photophobia and visual disturbance. Respiratory: Negative for cough, shortness of breath and wheezing. Cardiovascular: Negative for chest pain and palpitations. Gastrointestinal: Negative for nausea, vomiting, abdominal pain, diarrhea, constipation, blood in stool and abdominal distention. Endocrine: Negative for polydipsia, polyphagia and polyuria. Genitourinary: Negative for dysuria, frequency, hematuria and difficulty urinating. Musculoskeletal: Negative for myalgias, joint swelling. Skin: Negative for pallor and rash. Neurological: Negative for dizziness, tremors, light-headedness and headaches. Psychiatric/Behavioral: Negative for sleep disturbance and dysphoric mood. Objective:      Physical Exam   Vital signs reviewed. General: Well-developed and well-nourished. No acute distress. Skin: Warm, dry and intact. HEENT: Normocephalic. EOMs intact. Conjunctivae normal. Neck supple. Cardiovascular: Normal rate, regular rhythm. Pulmonary/Chest: Normal effort. Lungs clear to auscultation. No rales, rhonchi or wheezing. Abdominal: Positive bowel sounds. Soft, nontender. Nondistended. Musculoskeletal: Movement x4. Bilateral lower extremity edema. Neurological: Gait normal. Alert and oriented to person, place, and time. Psychiatric: Normal mood and affect. Speech and behavior normal. Judgment and thought content normal. Cognition and memory intact. Assessment:       Diagnosis Orders   1. Essential hypertension     2. Hyperlipidemia, unspecified hyperlipidemia type     3. Gastroesophageal reflux disease without esophagitis     4.  TRISHA (obstructive sleep apnea)     5. Obesity, Class III, BMI 40-49.9 (morbid obesity) ([address]     6. Anxiety     7. Prediabetes         Plan:    Dietitian visit today. Patient was encouraged to journal all food intake. Keep calorie level at approximately 8851-9647. Protein intake is to be a minimum of 60-80 grams per day. Water drinking was encouraged with a goal of 64oz-128oz daily. Beverages to be calorie free except for milk. Every other beverage should be water. Avoid soda. Continue to increase level of physical activity. Encouraged use of exercise log. Needs recheck Vitamin B1. Has order. Advised to speak to PCP about stronger diuretic regimen. Follow-up  Return in about 1 month (around 11/15/2020). Orders this encounter:  No orders of the defined types were placed in this encounter. Prescriptions this encounter:  No orders of the defined types were placed in this encounter.       Electronically signed by:  Lindsey Fry CNP

## 2020-10-28 ENCOUNTER — NURSE ONLY (OUTPATIENT)
Dept: BARIATRICS/WEIGHT MGMT | Age: 40
End: 2020-10-28

## 2020-10-28 VITALS — TEMPERATURE: 97.2 F

## 2020-10-28 NOTE — PROGRESS NOTES
Patient attends Introductory Support Group. Support Group schedule reviewed and attendance encouraged. Teaching done about bariatric multivitamins, calcium and protein supplements. [facility] product order form reviewed. Encouraged patient to formulate a medication plan prior to surgery. Diabetic guidelines offered to the entire group and asked that people abide by them if applicable. Allowed patients the opportunity to ask questions.

## 2020-11-10 ENCOUNTER — OFFICE VISIT (OUTPATIENT)
Dept: PULMONOLOGY | Age: 40
End: 2020-11-10
Payer: COMMERCIAL

## 2020-11-10 VITALS
HEART RATE: 90 BPM | DIASTOLIC BLOOD PRESSURE: 86 MMHG | RESPIRATION RATE: 18 BRPM | OXYGEN SATURATION: 97 % | BODY MASS INDEX: 40.46 KG/M2 | SYSTOLIC BLOOD PRESSURE: 117 MMHG | HEIGHT: 64 IN | WEIGHT: 237 LBS

## 2020-11-10 PROCEDURE — G8417 CALC BMI ABV UP PARAM F/U: HCPCS | Performed by: INTERNAL MEDICINE

## 2020-11-10 PROCEDURE — G8427 DOCREV CUR MEDS BY ELIG CLIN: HCPCS | Performed by: INTERNAL MEDICINE

## 2020-11-10 PROCEDURE — 1036F TOBACCO NON-USER: CPT | Performed by: INTERNAL MEDICINE

## 2020-11-10 PROCEDURE — 99203 OFFICE O/P NEW LOW 30 MIN: CPT | Performed by: INTERNAL MEDICINE

## 2020-11-10 PROCEDURE — G8484 FLU IMMUNIZE NO ADMIN: HCPCS | Performed by: INTERNAL MEDICINE

## 2020-11-10 ASSESSMENT — ENCOUNTER SYMPTOMS
EYES NEGATIVE: 1
GASTROINTESTINAL NEGATIVE: 1
RESPIRATORY NEGATIVE: 1

## 2020-11-10 NOTE — PROGRESS NOTES
B-1 (THIAMINE) 100 MG TABS tablet Take 1 tablet by mouth once daily 30 tablet 0    potassium chloride (KLOR-CON M) 20 MEQ extended release tablet Take 2 tablets by mouth daily 60 tablet 5    Metoprolol Succinate 25 MG CS24 Take by mouth 1/2 tab daily      montelukast (SINGULAIR) 10 MG tablet Take 1 tablet by mouth daily 30 tablet 3    chlorthalidone (HYGROTON) 25 MG tablet Take 1 tablet by mouth daily 30 tablet 5     No current facility-administered medications for this visit. Family History   Problem Relation Age of Onset    Hypertension Mother     Asthma Mother     Hypertension Maternal Aunt     Asthma Maternal Uncle     Hypertension Maternal Uncle     Arthritis Maternal Grandmother     Hypertension Maternal Grandmother     Stroke Maternal Grandmother     Breast Cancer Neg Hx     Cancer Neg Hx     Colon Cancer Neg Hx     Diabetes Neg Hx     Eclampsia Neg Hx     Ovarian Cancer Neg Hx      Labor Neg Hx     Spont Abortions Neg Hx        Social History     Tobacco Use    Smoking status: Never Smoker    Smokeless tobacco: Never Used   Substance Use Topics    Alcohol use: No     Alcohol/week: 0.0 standard drinks    Drug use: No       Review of Systems   Constitutional: Negative. HENT: Negative. Eyes: Negative. Respiratory: Negative. Cardiovascular: Negative. Gastrointestinal: Negative. All other systems reviewed and are negative. Objective:     Physical Exam  Vitals signs and nursing note reviewed. Constitutional:       Appearance: She is well-developed. She is obese. HENT:      Head: Normocephalic. Mouth/Throat:      Mouth: Mucous membranes are moist.      Pharynx: Oropharynx is clear. Comments: Large tongue , low hanging soft palate and large uvula. Overall pharyngeal orifice moderately decreased    Eyes:      General: No scleral icterus. Conjunctiva/sclera: Conjunctivae normal.   Neck:      Musculoskeletal: Neck supple.       Thyroid: No thyromegaly. Vascular: No JVD. Trachea: No tracheal deviation. Cardiovascular:      Rate and Rhythm: Normal rate and regular rhythm. Heart sounds: Normal heart sounds. No murmur. No gallop. Pulmonary:      Effort: Pulmonary effort is normal. No respiratory distress. Breath sounds: No wheezing or rales. Chest:      Chest wall: No tenderness. Abdominal:      Palpations: Abdomen is soft. Tenderness: There is no abdominal tenderness. Musculoskeletal:      Right lower leg: No edema. Left lower leg: No edema. Lymphadenopathy:      Cervical: No cervical adenopathy. Skin:     General: Skin is warm and dry. Neurological:      Mental Status: She is alert and oriented to person, place, and time. Wt Readings from Last 3 Encounters:   11/10/20 237 lb (107.5 kg)   10/15/20 237 lb (107.5 kg)   09/15/20 233 lb (105.7 kg)       Results for orders placed or performed during the hospital encounter of 08/29/20   TSH without Reflex   Result Value Ref Range    TSH 0.73 0.30 - 5.00 mIU/L       Assessment:      1. TRISHA on CPAP    2. Restrictive lung disease secondary to obesity    3. Class 3 severe obesity due to excess calories with serious comorbidity and body mass index (BMI) of 40.0 to 44.9 in adult ([facility]    4. Essential hypertension          Plan:      1. Long discussion with patient. Her sleep apnea is mild and she is currently without symptoms. I anticipate with weight loss that will no longer require CPAP. I did recommend however that she bring CPAP with her for use postop. 2. Avoid sedative hypnotics and alcohol at bedtime. 3. Patient cleared for bariatric surgery without excessive pulmonary risk. 4. Discussed strategies to mitigate risk of COVID-19 infection. 5. Return next fall. Thanks for the kind referral.  We will keep you posted as the work-up proceeds.       Electronically signed by Branden Laird DO on 11/10/2020 at 6:08 PM

## 2020-11-19 ENCOUNTER — OFFICE VISIT (OUTPATIENT)
Dept: BARIATRICS/WEIGHT MGMT | Age: 40
End: 2020-11-19
Payer: COMMERCIAL

## 2020-11-19 VITALS
DIASTOLIC BLOOD PRESSURE: 76 MMHG | HEIGHT: 64 IN | WEIGHT: 237 LBS | BODY MASS INDEX: 40.46 KG/M2 | RESPIRATION RATE: 18 BRPM | SYSTOLIC BLOOD PRESSURE: 126 MMHG | HEART RATE: 82 BPM

## 2020-11-19 PROCEDURE — G8417 CALC BMI ABV UP PARAM F/U: HCPCS | Performed by: NURSE PRACTITIONER

## 2020-11-19 PROCEDURE — G8427 DOCREV CUR MEDS BY ELIG CLIN: HCPCS | Performed by: NURSE PRACTITIONER

## 2020-11-19 PROCEDURE — G8484 FLU IMMUNIZE NO ADMIN: HCPCS | Performed by: NURSE PRACTITIONER

## 2020-11-19 PROCEDURE — 1036F TOBACCO NON-USER: CPT | Performed by: NURSE PRACTITIONER

## 2020-11-19 PROCEDURE — 99213 OFFICE O/P EST LOW 20 MIN: CPT | Performed by: NURSE PRACTITIONER

## 2020-11-19 NOTE — PROGRESS NOTES
Medical Weight Management Progress Note    Subjective      Patient being seen for medically supervised weight loss for the chronic conditions of HTN, GERD, Anxiety, Prediabetes, HLD. She is working on the behavior changes discussed at the initial appointment. Patient continues on diet plan. Physical activity includes walking. Weight loss of 0 lbs since last visit. Using CPAP. Psych eval scheduled in 2021 and EGD scheduled 21. Working on cardiac clearance. Received pulmonary clearance. No current issues. Working toward bariatric surgery:    [x] Sleeve Gastrectomy                                                           [] Deepak-en-Y Gastric Bypass    Allergies:  No Known Allergies    Past Medical History:     Past Medical History:   Diagnosis Date    Anxiety     GERD (gastroesophageal reflux disease)     Hyperlipidemia     Hypertension     Major depressive disorder, recurrent episode, mild ([facility] [address] 10/21/2015    Obesity, Class III, BMI 40-49.9 (morbid obesity) ([facility] [address]     TRISHA (obstructive sleep apnea)     Pre-diabetes    .     Past Surgical History:  Past Surgical History:   Procedure Laterality Date     SECTION      x1       Family History:  Family History   Problem Relation Age of Onset    Hypertension Mother     Asthma Mother     Hypertension Maternal Aunt     Asthma Maternal Uncle     Hypertension Maternal Uncle     Arthritis Maternal Grandmother     Hypertension Maternal Grandmother     Stroke Maternal Grandmother     Breast Cancer Neg Hx     Cancer Neg Hx     Colon Cancer Neg Hx     Diabetes Neg Hx     Eclampsia Neg Hx     Ovarian Cancer Neg Hx      Labor Neg Hx     Spont Abortions Neg Hx        Social History:  Social History     Socioeconomic History    Marital status: Single     Spouse name: Not on file    Number of children: Not on file    Years of education: Not on file    Highest education level: Not on file   Occupational History    Not on file   Social Needs    Financial resource strain: Not on file    Food insecurity     Worry: Not on file     Inability: Not on file    Transportation needs     Medical: Not on file     Non-medical: Not on file   Tobacco Use    Smoking status: Never Smoker    Smokeless tobacco: Never Used   Substance and Sexual Activity    Alcohol use: No     Alcohol/week: 0.0 standard drinks    Drug use: No    Sexual activity: Never     Partners: Male   Lifestyle    Physical activity     Days per week: Not on file     Minutes per session: Not on file    Stress: Not on file   Relationships    Social connections     Talks on phone: Not on file     Gets together: Not on file     Attends Shinto service: Not on file     Active member of club or organization: Not on file     Attends meetings of clubs or organizations: Not on file     Relationship status: Not on file    Intimate partner violence     Fear of current or ex partner: Not on file     Emotionally abused: Not on file     Physically abused: Not on file     Forced sexual activity: Not on file   Other Topics Concern    Not on file   Social History Narrative    Not on file       Current Medications:  Current Outpatient Medications   Medication Sig Dispense Refill    amLODIPine (NORVASC) 5 MG tablet Take 1 tablet by mouth daily 30 tablet 11    losartan (COZAAR) 100 MG tablet Take 1 tablet by mouth daily 30 tablet 11    vitamin B-1 (THIAMINE) 100 MG TABS tablet Take 1 tablet by mouth once daily 30 tablet 0    potassium chloride (KLOR-CON M) 20 MEQ extended release tablet Take 2 tablets by mouth daily 60 tablet 5    Metoprolol Succinate 25 MG CS24 Take by mouth 1/2 tab daily      montelukast (SINGULAIR) 10 MG tablet Take 1 tablet by mouth daily 30 tablet 3    chlorthalidone (HYGROTON) 25 MG tablet Take 1 tablet by mouth daily 30 tablet 5     No current facility-administered medications for this visit.         Vital Signs:  /76 (Site: Right Upper Arm, Position: Sitting, Cuff Size: Large Adult)   Pulse 82   Resp 18   Ht 5' 4\" (1.626 m)   Wt 237 lb (107.5 kg)   BMI 40.68 kg/m²     BMI/Height/Weight:  Body mass index is 40.68 kg/m². Review of Systems - A review of systems was performed. All was negative unless otherwise documented in HPI. Constitutional: Negative for fever, chills and diaphoresis. HENT: Negative for hearing loss and trouble swallowing. Eyes: Negative for photophobia and visual disturbance. Respiratory: Negative for cough, shortness of breath and wheezing. Cardiovascular: Negative for chest pain and palpitations. Gastrointestinal: Negative for nausea, vomiting, abdominal pain, diarrhea, constipation, blood in stool and abdominal distention. Endocrine: Negative for polydipsia, polyphagia and polyuria. Genitourinary: Negative for dysuria, frequency, hematuria and difficulty urinating. Musculoskeletal: Negative for myalgias, joint swelling. Skin: Negative for pallor and rash. Neurological: Negative for dizziness, tremors, light-headedness and headaches. Psychiatric/Behavioral: Negative for sleep disturbance and dysphoric mood. Objective:      Physical Exam   Vital signs reviewed. General: Well-developed and well-nourished. No acute distress. Skin: Warm, dry and intact. HEENT: Normocephalic. EOMs intact. Conjunctivae normal. Neck supple. Cardiovascular: Normal rate, regular rhythm. Pulmonary/Chest: Normal effort. Lungs clear to auscultation. No rales, rhonchi or wheezing. Abdominal: Positive bowel sounds. Soft, nontender. Nondistended. Musculoskeletal: Movement x4. Bilateral lower extremity edema. Neurological: Gait normal. Alert and oriented to person, place, and time. Psychiatric: Normal mood and affect. Speech and behavior normal. Judgment and thought content normal. Cognition and memory intact. Assessment:       Diagnosis Orders   1. Essential hypertension     2.  Hyperlipidemia, unspecified hyperlipidemia type     3. Gastroesophageal reflux disease without esophagitis     4. TRISHA (obstructive sleep apnea)     5. Obesity, Class III, BMI 40-49.9 (morbid obesity) ([address]     6. Anxiety     7. Prediabetes     8. Thiamine deficiency         Plan:    Dietitian visit today. Patient was encouraged to journal all food intake. Keep calorie level at approximately 3880-4860. Protein intake is to be a minimum of 60-80 grams per day. Water drinking was encouraged with a goal of 64oz-128oz daily. Beverages to be calorie free except for milk. Every other beverage should be water. Avoid soda. Continue to increase level of physical activity. Encouraged use of exercise log. Needs recheck Vitamin B1. Has order. Follow-up  Return in about 1 month (around 12/19/2020). Orders this encounter:  No orders of the defined types were placed in this encounter. Prescriptions this encounter:  No orders of the defined types were placed in this encounter.       Electronically signed by:  Olayinka Jack CNP

## 2020-11-19 NOTE — PROGRESS NOTES
Medical Nutrition Therapy   Metabolic and Bariatric Surgery         Supervised diet and exercise preparation  Visit 5 out of 6  Pt reports:      Pt currently following structured meal plan 8pro/3veg/2fr/6 starch/3fat from education binder diet for weight management. Reviewed with pt. Vitals: Wt Readings from Last 3 Encounters:   11/19/20 237 lb (107.5 kg)   11/10/20 237 lb (107.5 kg)   10/15/20 237 lb (107.5 kg)     stable / unchanged      Nutrition Assessment:   PES: Knowledge deficit related to healthy behaviors that support weight management post weight loss surgery as evidenced by Body mass index is 40.68 kg/m². Nutrition Assessment of Goal Attainment:  TREATMENT GOALS:    1. Pt  Completed 6 out of 6 goals. 2.TREATMENT GOALS FOR UPCOMING WEEK: continue all previous goals and add: # 0    All goals were planned with and agreed on by the patient. I want to improve my health because the kids. appt # NA G What is your next step? C 1 2 3 4 5 6 7 8 9     3  one I will read the education binder provided to me and the  Emotional eating checklist by my next visit. 50 50 ?            x 2 I will make my pschological evaluation appoinment. 100             5  3 I will bring this goal card to every appointment. 100 0 100 100          x 4 I will eliminate all tobacco/nicotine. x 5 I will limit alcoholic beverages to 3-2PY per week. 3  6 I will limit dining out to 3 times per week or less. x   ? 100           x 7 I will eliminate sugary beverages. x 8 I will eliminate carbonated beverages. x 9 I will eliminate drinking with a straw. 10 I will limit caffeinated beverages to 16oz daily. x               x 11 I will limit cold cereals prepared with milk. 12 I will do a 5 minute reflection. 13 I will food journal daily. 5  14 I will log my exercise daily.   100  0 100 100         3  15 I

## 2020-12-04 ENCOUNTER — OFFICE VISIT (OUTPATIENT)
Dept: FAMILY MEDICINE CLINIC | Age: 40
End: 2020-12-04
Payer: COMMERCIAL

## 2020-12-04 VITALS
HEIGHT: 65 IN | OXYGEN SATURATION: 95 % | WEIGHT: 235.4 LBS | TEMPERATURE: 97.5 F | DIASTOLIC BLOOD PRESSURE: 80 MMHG | HEART RATE: 86 BPM | BODY MASS INDEX: 39.22 KG/M2 | SYSTOLIC BLOOD PRESSURE: 126 MMHG

## 2020-12-04 PROBLEM — F41.9 ANXIETY: Status: RESOLVED | Noted: 2020-07-27 | Resolved: 2020-12-04

## 2020-12-04 PROCEDURE — G8484 FLU IMMUNIZE NO ADMIN: HCPCS | Performed by: NURSE PRACTITIONER

## 2020-12-04 PROCEDURE — 99396 PREV VISIT EST AGE 40-64: CPT | Performed by: NURSE PRACTITIONER

## 2020-12-04 ASSESSMENT — ENCOUNTER SYMPTOMS
COUGH: 0
VOMITING: 0
SHORTNESS OF BREATH: 0
EYES NEGATIVE: 1
CONSTIPATION: 0
BLOOD IN STOOL: 0
RESPIRATORY NEGATIVE: 1
DIARRHEA: 0
ANAL BLEEDING: 0
GASTROINTESTINAL NEGATIVE: 1
COLOR CHANGE: 0
NAUSEA: 0
CHEST TIGHTNESS: 0

## 2020-12-04 ASSESSMENT — PATIENT HEALTH QUESTIONNAIRE - PHQ9
1. LITTLE INTEREST OR PLEASURE IN DOING THINGS: 0
SUM OF ALL RESPONSES TO PHQ QUESTIONS 1-9: 0
SUM OF ALL RESPONSES TO PHQ9 QUESTIONS 1 & 2: 0
SUM OF ALL RESPONSES TO PHQ QUESTIONS 1-9: 0
SUM OF ALL RESPONSES TO PHQ QUESTIONS 1-9: 0
2. FEELING DOWN, DEPRESSED OR HOPELESS: 0

## 2020-12-04 NOTE — PATIENT INSTRUCTIONS
unless the hysterectomy was done as a treatment for cervical pre-cancer (or cancer). Women who have had a hysterectomy without removal of the cervix (called a supra-cervical hysterectomy) should continue cervical cancer screening according to the guidelines above. Women of any age should NOT be screened every year by any screening method. Women who have been vaccinated against HPV should still follow these guidelines. Some women believe that they can stop cervical cancer screening once they have stopped having children. This is not true. They should continue to follow American Cancer Society guidelines. Although annual (every year) screening should not be done, women who have abnormal screening results may need to have a follow-up Pap test (sometimes with a HPV test) done in 6 months or a year. The American Cancer Society guidelines for early detection of cervical cancer do not apply to women who have been diagnosed with cervical cancer, cervical pre-cancer, or HIV infection. These women should have follow-up testing and cervical cancer screening as recommended by their health care team.        The following is a draft recommendation from the U[place]F. The following is from the [employer]. https://[employer].info/      +++++++++++++++++++++++++++++++++++++++++++++++++++++++++++++      The following website is from the [facility] [employer] [place] that discusses breast cancer screening. Please review this information so we can discuss what is appropriate for you. If you this website does not work, please let me know. http://[employer]/. org/cancer/breastcancer/moreinformation/breastcancerearlydetection/breast-cancer-early-iyumrmlrh-zop-gbox    For women at average risk    These guidelines are for women at average risk for breast cancer.  Women with a personal history of breast cancer, a family history of breast cancer, a genetic mutation known to increase risk of breast cancer (such as BRCA), and women who had radiation therapy to the chest before the age of 27 are at higher risk for breast cancer, not average-risk. (See below for guidelines for women at higher than average risk.)    Women ages 36 to 40 should have the choice to start annual breast cancer screening with mammograms if they wish to do so. The risks of screening as well as the potential benefits should be considered. Women age 39 to 47 should get mammograms every year. Women age 54 and older should switch to mammograms every 2 years, or have the choice to continue yearly screening. Screening should continue as long as a woman is in good health and is expected to live 10 more years or longer. All women should be familiar with the known benefits, limitations, and potential harms associated with breast cancer screening. They should also be familiar with how their breasts normally look and feel and report any changes to a health care provider right away. Mammograms    Regular mammograms can often help find breast cancer at an early stage, when treatment is most likely to be successful. A mammogram can find breast changes that could be cancer years before physical symptoms develop. Results from many decades of research clearly show that women who have regular mammograms are more likely to have breast cancer found early, less likely to need aggressive treatment (like surgery to remove the entire breast [mastectomy] and chemotherapy), and more likely to be cured. Mammograms are not perfect. They miss some cancers. And sometimes more tests will be needed to find out if something found on a mammogram is or is not cancer. [place] also a small possibility of being diagnosed with a cancer that never would have caused any problems had it not been found during screening.  Its important that women getting atypical lobular hyperplasia (ALH)  Having dense breasts (extremely or heterogeneously dense) as seen on a mammogram  If MRI is used, it should be in addition to, not instead of, a screening mammogram. This is because although an MRI is a more sensitive test (its more likely to detect cancer than a mammogram), it may still miss some cancers that a mammogram would detect. For most women at high risk, screening with MRI and mammograms should begin at age 27 years and continue for as long as a woman is in good health. But because the evidence is limited about the best age at which to start screening, this decision should be based on shared decision-making between patients and their health care providers, taking into account personal circumstances and preferences.

## 2020-12-04 NOTE — PROGRESS NOTES
medications for this visit. No Known Allergies      HPI:     HPI    Presents today c/o routine physical exam  Follows w/ Bariatrics with upcoming plans for surgery   Planning on fostering a daughter soon, needs paperwork filled out  Has 4 children (25, 25, 15, 5)  Works for [employer]     PMH - HTN, HLD, IFG, TRISHA, obesity  PSH -    PFH - HTN and asthma in mother, arthritis HTN CVD in grandmother  Social history - non smoker, no alcohol or drug use   Sleeps well at night  PHQ 9 - 0     Health Maintenance:      Subjective:     Review of Systems   Constitutional: Negative. Negative for activity change, appetite change, chills, diaphoresis, fatigue, fever and unexpected weight change. HENT: Negative. Eyes: Negative. Respiratory: Negative. Negative for cough, chest tightness and shortness of breath. Cardiovascular: Negative. Negative for chest pain and palpitations. Gastrointestinal: Negative. Negative for anal bleeding, blood in stool, constipation, diarrhea, nausea and vomiting. Endocrine: Negative. Genitourinary: Negative. Negative for difficulty urinating, dysuria, hematuria and menstrual problem (regular menses). Musculoskeletal: Negative. Skin: Negative. Negative for color change, pallor, rash and wound. Allergic/Immunologic: Positive for environmental allergies. Neurological: Negative. Negative for dizziness, seizures, syncope, speech difficulty, weakness, light-headedness and headaches. Hematological: Negative. Does not bruise/bleed easily. Psychiatric/Behavioral: Negative. Negative for dysphoric mood and sleep disturbance. The patient is not nervous/anxious. Objective:     Vitals:    20 0801   BP: 126/80   Pulse: 86   Temp: 97.5 °F (36.4 °C)   SpO2: 95%       Body mass index is 39.17 kg/m². Physical Exam  Constitutional:       General: She is not in acute distress. Appearance: Normal appearance. She is well-developed. She is obese. She is not ill-appearing, toxic-appearing or diaphoretic. HENT:      Head: Normocephalic and atraumatic. Right Ear: Tympanic membrane, ear canal and external ear normal.      Left Ear: Tympanic membrane, ear canal and external ear normal.      Nose: Nose normal.      Mouth/Throat:      Mouth: Mucous membranes are moist.      Pharynx: Oropharynx is clear. Eyes:      General: No scleral icterus. Right eye: No discharge. Left eye: No discharge. Conjunctiva/sclera: Conjunctivae normal.      Pupils: Pupils are equal, round, and reactive to light. Neck:      Musculoskeletal: Normal range of motion and neck supple. Trachea: No tracheal deviation. Cardiovascular:      Rate and Rhythm: Normal rate and regular rhythm. Heart sounds: Normal heart sounds. No murmur. No friction rub. No gallop. Pulmonary:      Effort: Pulmonary effort is normal. No tachypnea, accessory muscle usage or respiratory distress. Breath sounds: Normal breath sounds. No stridor. No decreased breath sounds, wheezing, rhonchi or rales. Abdominal:      General: Bowel sounds are normal. There is no distension. Palpations: Abdomen is soft. Tenderness: There is no abdominal tenderness. There is no guarding. Musculoskeletal: Normal range of motion. General: No tenderness or deformity. Right lower leg: No edema. Left lower leg: No edema. Lymphadenopathy:      Cervical: No cervical adenopathy. Skin:     General: Skin is warm and dry. Coloration: Skin is not pale. Findings: No erythema or rash. Neurological:      Mental Status: She is alert and oriented to person, place, and time. GCS: GCS eye subscore is 4. GCS verbal subscore is 5. GCS motor subscore is 6. Gait: Gait is intact. Psychiatric:         Speech: Speech normal.         Behavior: Behavior normal.         Thought Content:  Thought content normal.         Judgment: Judgment normal. Assessment:         1. Routine physical examination    2. Encounter for screening mammogram for malignant neoplasm of breast        Plan:     1. Routine physical examination    Preventative care discussed   Encouraged PAP update  Weight loss and routine exercise  Healthy diet  Flu UTD, encouraged Tdap  Paperwork completed  F/u 6 months or sooner PRN    2. Encounter for screening mammogram for malignant neoplasm of breast    - JOSE ALEJANDRO DIGITAL SCREEN W OR WO CAD BILATERAL; Future      Discussed use, benefit, and side effects of prescribed medications. All patient questions answered. Pt voiced understanding. Reviewed health maintenance. Instructed to continue current medications, diet and exercise. Patient agreedwith treatment plan. Follow up as directed.      Electronically signed by LUZ Hale CNP on 12/4/2020

## 2020-12-15 ENCOUNTER — OFFICE VISIT (OUTPATIENT)
Dept: BARIATRICS/WEIGHT MGMT | Age: 40
End: 2020-12-15
Payer: COMMERCIAL

## 2020-12-15 VITALS
HEART RATE: 72 BPM | SYSTOLIC BLOOD PRESSURE: 104 MMHG | TEMPERATURE: 93.7 F | HEIGHT: 64 IN | RESPIRATION RATE: 16 BRPM | BODY MASS INDEX: 39.44 KG/M2 | DIASTOLIC BLOOD PRESSURE: 74 MMHG | WEIGHT: 231 LBS

## 2020-12-15 PROCEDURE — G8417 CALC BMI ABV UP PARAM F/U: HCPCS | Performed by: NURSE PRACTITIONER

## 2020-12-15 PROCEDURE — G8484 FLU IMMUNIZE NO ADMIN: HCPCS | Performed by: NURSE PRACTITIONER

## 2020-12-15 PROCEDURE — 1036F TOBACCO NON-USER: CPT | Performed by: NURSE PRACTITIONER

## 2020-12-15 PROCEDURE — G8427 DOCREV CUR MEDS BY ELIG CLIN: HCPCS | Performed by: NURSE PRACTITIONER

## 2020-12-15 PROCEDURE — 99213 OFFICE O/P EST LOW 20 MIN: CPT | Performed by: NURSE PRACTITIONER

## 2020-12-15 NOTE — PROGRESS NOTES
3  15 I will determine my  calcium and multivitamin plan. procare x    100            16 I will purchase multivitamin. 17 I will start taking multivitamins following my plan. 5  18 I will have 1-2 servings of protein present at each meal. x     80         5  19 I will eat every 3-5 hours. x     80         5  20 I will drink 64oz of fluid daily. x     100           21 I will follow the 15-30-15 guideline. x                22 I will eat protein first at all meals followed by vegetables  Fruit and lastly whole grains. x              3  23 My first one diet neutral approach is:  I will transition to one small coffee daily x   ? 100          5  24 my second diet neutral approach is:  I will have a 10am minimeal.    ? 100 100           25 My third diet neutral approach is:                                                                        Do you understand your goals? y    Do you have the information you need to achieve your goals? y    Do you have any questions  right now? n        [x]  Consistent goal achievement in the program thus far and further success with goals is expected. []  Unable to consistently make progress in goal achievement. At this time patient is not moving forward  in developing the skills needed for success after surgery. Plan:    Continue to follow monthly and review goals.          []  Nutrition visits complete    [x]

## 2020-12-15 NOTE — PROGRESS NOTES
Medical Weight Management Progress Note    Subjective      Patient being seen for medically supervised weight loss for the chronic conditions of HTN, GERD, Anxiety, Prediabetes, HLD. She is working on the behavior changes discussed at the initial appointment. Patient continues on diet plan. Physical activity includes walking. Weight loss of 6 lbs since last visit. Using CPAP. Psych eval scheduled in 2021 and EGD scheduled 21. Working on cardiac clearance. Received pulmonary clearance. No current issues. Working toward bariatric surgery:    [x] Sleeve Gastrectomy                                                           [] Deepak-en-Y Gastric Bypass    Allergies:  No Known Allergies    Past Medical History:     Past Medical History:   Diagnosis Date    GERD (gastroesophageal reflux disease)     Hyperlipidemia     Hypertension     Major depressive disorder, recurrent episode, mild ([facility] [address] 10/21/2015    Obesity, Class III, BMI 40-49.9 (morbid obesity) ([facility] [address]     TRISHA (obstructive sleep apnea)     Pre-diabetes    .     Past Surgical History:  Past Surgical History:   Procedure Laterality Date     SECTION      x1       Family History:  Family History   Problem Relation Age of Onset    Hypertension Mother     Asthma Mother     Hypertension Maternal Aunt     Asthma Maternal Uncle     Hypertension Maternal Uncle     Arthritis Maternal Grandmother     Hypertension Maternal Grandmother     Stroke Maternal Grandmother     Breast Cancer Neg Hx     Cancer Neg Hx     Colon Cancer Neg Hx     Diabetes Neg Hx     Eclampsia Neg Hx     Ovarian Cancer Neg Hx      Labor Neg Hx     Spont Abortions Neg Hx        Social History:  Social History     Socioeconomic History    Marital status: Single     Spouse name: Not on file    Number of children: Not on file    Years of education: Not on file    Highest education level: Not on file   Occupational History    Not on file Social Needs    Financial resource strain: Not on file    Food insecurity     Worry: Not on file     Inability: Not on file    Transportation needs     Medical: Not on file     Non-medical: Not on file   Tobacco Use    Smoking status: Never Smoker    Smokeless tobacco: Never Used   Substance and Sexual Activity    Alcohol use: No     Alcohol/week: 0.0 standard drinks    Drug use: No    Sexual activity: Not Currently     Partners: Male   Lifestyle    Physical activity     Days per week: Not on file     Minutes per session: Not on file    Stress: Not on file   Relationships    Social connections     Talks on phone: Not on file     Gets together: Not on file     Attends Shinto service: Not on file     Active member of club or organization: Not on file     Attends meetings of clubs or organizations: Not on file     Relationship status: Not on file    Intimate partner violence     Fear of current or ex partner: Not on file     Emotionally abused: Not on file     Physically abused: Not on file     Forced sexual activity: Not on file   Other Topics Concern    Not on file   Social History Narrative    Not on file       Current Medications:  Current Outpatient Medications   Medication Sig Dispense Refill    amLODIPine (NORVASC) 5 MG tablet Take 1 tablet by mouth daily 30 tablet 11    losartan (COZAAR) 100 MG tablet Take 1 tablet by mouth daily 30 tablet 11    potassium chloride (KLOR-CON M) 20 MEQ extended release tablet Take 2 tablets by mouth daily 60 tablet 5    Metoprolol Succinate 25 MG CS24 Take by mouth 1/2 tab daily      montelukast (SINGULAIR) 10 MG tablet Take 1 tablet by mouth daily 30 tablet 3    chlorthalidone (HYGROTON) 25 MG tablet Take 1 tablet by mouth daily 30 tablet 5     No current facility-administered medications for this visit.         Vital Signs: /74 (Site: Right Upper Arm, Position: Sitting, Cuff Size: Large Adult)   Pulse 72   Temp 93.7 °F (34.3 °C) (Infrared)   Resp 16   Ht 5' 4\" (1.626 m)   Wt 231 lb (104.8 kg)   LMP 12/13/2020 (Exact Date)   BMI 39.65 kg/m²     BMI/Height/Weight:  Body mass index is 39.65 kg/m². Review of Systems - A review of systems was performed. All was negative unless otherwise documented in HPI. Constitutional: Negative for fever, chills and diaphoresis. HENT: Negative for hearing loss and trouble swallowing. Eyes: Negative for photophobia and visual disturbance. Respiratory: Negative for cough, shortness of breath and wheezing. Cardiovascular: Negative for chest pain and palpitations. Gastrointestinal: Negative for nausea, vomiting, abdominal pain, diarrhea, constipation, blood in stool and abdominal distention. Endocrine: Negative for polydipsia, polyphagia and polyuria. Genitourinary: Negative for dysuria, frequency, hematuria and difficulty urinating. Musculoskeletal: Negative for myalgias, joint swelling. Skin: Negative for pallor and rash. Neurological: Negative for dizziness, tremors, light-headedness and headaches. Psychiatric/Behavioral: Negative for sleep disturbance and dysphoric mood. Objective:      Physical Exam   Vital signs reviewed. General: Well-developed and well-nourished. No acute distress. Skin: Warm, dry and intact. HEENT: Normocephalic. EOMs intact. Conjunctivae normal. Neck supple. Cardiovascular: Normal rate, regular rhythm. Pulmonary/Chest: Normal effort. Lungs clear to auscultation. No rales, rhonchi or wheezing. Abdominal: Positive bowel sounds. Soft, nontender. Nondistended. Musculoskeletal: Movement x4. Bilateral lower extremity edema. Neurological: Gait normal. Alert and oriented to person, place, and time. Psychiatric: Normal mood and affect. Speech and behavior normal. Judgment and thought content normal. Cognition and memory intact. Assessment:       Diagnosis Orders   1. Essential hypertension     2. Gastroesophageal reflux disease without esophagitis     3. TRISHA (obstructive sleep apnea)     4. Obesity (BMI 30-39.9)     5. Prediabetes         Plan:    Dietitian visit today. Patient was encouraged to journal all food intake. Keep calorie level at approximately 9261-0956. Protein intake is to be a minimum of 60-80 grams per day. Water drinking was encouraged with a goal of 64oz-128oz daily. Beverages to be calorie free except for milk. Every other beverage should be water. Avoid soda. Continue to increase level of physical activity. Encouraged use of exercise log. Needs recheck Vitamin B1. Has order. Follow-up  Return in about 1 month (around 1/15/2021). Orders this encounter:  No orders of the defined types were placed in this encounter. Prescriptions this encounter:  No orders of the defined types were placed in this encounter.       Electronically signed by:  Dianna West CNP

## 2021-01-07 ENCOUNTER — OFFICE VISIT (OUTPATIENT)
Dept: BARIATRICS/WEIGHT MGMT | Age: 41
End: 2021-01-07
Payer: COMMERCIAL

## 2021-01-07 VITALS
HEIGHT: 64 IN | DIASTOLIC BLOOD PRESSURE: 76 MMHG | HEART RATE: 74 BPM | SYSTOLIC BLOOD PRESSURE: 126 MMHG | RESPIRATION RATE: 20 BRPM | WEIGHT: 235 LBS | BODY MASS INDEX: 40.12 KG/M2

## 2021-01-07 DIAGNOSIS — R94.2 ABNORMAL PFTS (PULMONARY FUNCTION TESTS): ICD-10-CM

## 2021-01-07 DIAGNOSIS — E66.01 OBESITY, CLASS III, BMI 40-49.9 (MORBID OBESITY) (HCC): ICD-10-CM

## 2021-01-07 DIAGNOSIS — E87.6 HYPOKALEMIA: ICD-10-CM

## 2021-01-07 DIAGNOSIS — J30.9 ALLERGIC RHINITIS, UNSPECIFIED SEASONALITY, UNSPECIFIED TRIGGER: ICD-10-CM

## 2021-01-07 DIAGNOSIS — G47.33 OSA (OBSTRUCTIVE SLEEP APNEA): ICD-10-CM

## 2021-01-07 DIAGNOSIS — I10 ESSENTIAL HYPERTENSION: Primary | ICD-10-CM

## 2021-01-07 DIAGNOSIS — E78.5 HYPERLIPIDEMIA, UNSPECIFIED HYPERLIPIDEMIA TYPE: ICD-10-CM

## 2021-01-07 DIAGNOSIS — K21.9 GASTROESOPHAGEAL REFLUX DISEASE WITHOUT ESOPHAGITIS: ICD-10-CM

## 2021-01-07 DIAGNOSIS — E51.9 THIAMINE DEFICIENCY: ICD-10-CM

## 2021-01-07 DIAGNOSIS — R73.03 PREDIABETES: ICD-10-CM

## 2021-01-07 PROCEDURE — G8417 CALC BMI ABV UP PARAM F/U: HCPCS | Performed by: NURSE PRACTITIONER

## 2021-01-07 PROCEDURE — 1036F TOBACCO NON-USER: CPT | Performed by: NURSE PRACTITIONER

## 2021-01-07 PROCEDURE — G8484 FLU IMMUNIZE NO ADMIN: HCPCS | Performed by: NURSE PRACTITIONER

## 2021-01-07 PROCEDURE — G8427 DOCREV CUR MEDS BY ELIG CLIN: HCPCS | Performed by: NURSE PRACTITIONER

## 2021-01-07 PROCEDURE — 99213 OFFICE O/P EST LOW 20 MIN: CPT | Performed by: NURSE PRACTITIONER

## 2021-01-07 RX ORDER — POTASSIUM CHLORIDE 20 MEQ/1
40 TABLET, EXTENDED RELEASE ORAL DAILY
Qty: 180 TABLET | Refills: 0 | Status: SHIPPED | OUTPATIENT
Start: 2021-01-07 | End: 2021-03-18

## 2021-01-07 RX ORDER — MONTELUKAST SODIUM 10 MG/1
10 TABLET ORAL DAILY
Qty: 90 TABLET | Refills: 0 | Status: ON HOLD | OUTPATIENT
Start: 2021-01-07 | End: 2021-01-29 | Stop reason: ALTCHOICE

## 2021-01-07 NOTE — TELEPHONE ENCOUNTER
Bernardino Boyd is calling to request a refill on the following medication(s):    Medication Request:  Requested Prescriptions      No prescriptions requested or ordered in this encounter       Last Visit Date (If Applicable):  28/2/9578    Next Visit Date:    6/4/2021

## 2021-01-07 NOTE — PATIENT INSTRUCTIONS
Metabolic and Bariatric Surgery  Supervised diet and exercise preparation  Initial nutrition  assessment by RD and individualized goal setting appropriate to the patients stage of readiness to change and  in partnership with the patient have been initiated refer to the outline below for key components. Components of supervised diet:   Reducing frequency of dining out especially fast food to 3 times a week or less. Elminating sweetened beverages. Eliminating carbonated beverages. Limiting alcohol to one alcoholic beverage per week. Increasing intake of WATER to 64oz daily as tolerated. Eliminate high sugar foods. Begin MVI & calcium Regimen. Limit caffeinated intake to 8-12 oz daily    Components of supervised behavior modification   Scheduling regular meal times. Eliminate Smoking  Participation in cognitive exercises to enhance self discovery re reasons for poor  food choices. Refer to psychologist for support re emotional eating as appropriate. Trial of food journal to enhance self discovery re food choices  Attend appointments on time and as scheduled  Read the Metabolic and Bariatric Surgery patient education binder      Components of supervised Exercise  Daily exercise as tolerated with 80% success rate as outlined below:      Month  Minutes per day   1 10   2 14   3 18   4 22   5 26   6 30     TODAY: Exercise goal was met. Exercise log was reviewed with the patient as appropriate. Dietitian will follow routinely each month to review diet, behavior modification and exercise goals.

## 2021-01-07 NOTE — PROGRESS NOTES
Medical Weight Management Progress Note    Subjective      Patient being seen for medically supervised weight loss for the chronic conditions of HTN, GERD, Anxiety, Prediabetes, HLD. She is working on the behavior changes discussed at the initial appointment. Patient continues on diet plan. Physical activity includes walking. Weight gain of 4 lbs since last visit. Using CPAP. Psych eval scheduled 1/15/21 and EGD scheduled 21. Received cardiac and pulmonary clearances. No current issues. Working toward bariatric surgery:    [x] Sleeve Gastrectomy                                                           [] Deepak-en-Y Gastric Bypass    Allergies:  No Known Allergies    Past Medical History:     Past Medical History:   Diagnosis Date    GERD (gastroesophageal reflux disease)     Hyperlipidemia     Hypertension     Major depressive disorder, recurrent episode, mild ([facility] [address] 10/21/2015    Obesity, Class III, BMI 40-49.9 (morbid obesity) ([facility] [address]     TRISHA (obstructive sleep apnea)     Pre-diabetes    .     Past Surgical History:  Past Surgical History:   Procedure Laterality Date     SECTION      x1       Family History:  Family History   Problem Relation Age of Onset    Hypertension Mother     Asthma Mother     Hypertension Maternal Aunt     Asthma Maternal Uncle     Hypertension Maternal Uncle     Arthritis Maternal Grandmother     Hypertension Maternal Grandmother     Stroke Maternal Grandmother     Breast Cancer Neg Hx     Cancer Neg Hx     Colon Cancer Neg Hx     Diabetes Neg Hx     Eclampsia Neg Hx     Ovarian Cancer Neg Hx      Labor Neg Hx     Spont Abortions Neg Hx        Social History:  Social History     Socioeconomic History    Marital status: Single     Spouse name: Not on file    Number of children: Not on file    Years of education: Not on file    Highest education level: Not on file   Occupational History    Not on file   Social Needs  Financial resource strain: Not on file    Food insecurity     Worry: Not on file     Inability: Not on file   [employer] needs     Medical: Not on file     Non-medical: Not on file   Tobacco Use    Smoking status: Never Smoker    Smokeless tobacco: Never Used   Substance and Sexual Activity    Alcohol use: No     Alcohol/week: 0.0 standard drinks    Drug use: No    Sexual activity: Not Currently     Partners: Male   Lifestyle    Physical activity     Days per week: Not on file     Minutes per session: Not on file    Stress: Not on file   Relationships    Social connections     Talks on phone: Not on file     Gets together: Not on file     Attends Restoration service: Not on file     Active member of club or organization: Not on file     Attends meetings of clubs or organizations: Not on file     Relationship status: Not on file    Intimate partner violence     Fear of current or ex partner: Not on file     Emotionally abused: Not on file     Physically abused: Not on file     Forced sexual activity: Not on file   Other Topics Concern    Not on file   Social History Narrative    Not on file       Current Medications:  Current Outpatient Medications   Medication Sig Dispense Refill    montelukast (SINGULAIR) 10 MG tablet Take 1 tablet by mouth daily 90 tablet 0    potassium chloride (KLOR-CON M) 20 MEQ extended release tablet Take 2 tablets by mouth daily 180 tablet 0    amLODIPine (NORVASC) 5 MG tablet Take 1 tablet by mouth daily 30 tablet 11    losartan (COZAAR) 100 MG tablet Take 1 tablet by mouth daily 30 tablet 11    Metoprolol Succinate 25 MG CS24 Take by mouth 1/2 tab daily      chlorthalidone (HYGROTON) 25 MG tablet Take 1 tablet by mouth daily 30 tablet 5     No current facility-administered medications for this visit.         Vital Signs: /76 (Site: Right Upper Arm, Position: Sitting, Cuff Size: Large Adult)   Pulse 74   Resp 20   Ht 5' 4\" (1.626 m)   Wt 235 lb (106.6 kg)   LMP 12/13/2020 (Exact Date)   BMI 40.34 kg/m²     BMI/Height/Weight:  Body mass index is 40.34 kg/m². Review of Systems - A review of systems was performed. All was negative unless otherwise documented in HPI. Constitutional: Negative for fever, chills and diaphoresis. HENT: Negative for hearing loss and trouble swallowing. Eyes: Negative for photophobia and visual disturbance. Respiratory: Negative for cough, shortness of breath and wheezing. Cardiovascular: Negative for chest pain and palpitations. Gastrointestinal: Negative for nausea, vomiting, abdominal pain, diarrhea, constipation, blood in stool and abdominal distention. Endocrine: Negative for polydipsia, polyphagia and polyuria. Genitourinary: Negative for dysuria, frequency, hematuria and difficulty urinating. Musculoskeletal: Negative for myalgias, joint swelling. Skin: Negative for pallor and rash. Neurological: Negative for dizziness, tremors, light-headedness and headaches. Psychiatric/Behavioral: Negative for sleep disturbance and dysphoric mood. Objective:      Physical Exam   Vital signs reviewed. General: Well-developed and well-nourished. No acute distress. Skin: Warm, dry and intact. HEENT: Normocephalic. EOMs intact. Conjunctivae normal. Neck supple. Cardiovascular: Normal rate, regular rhythm. Pulmonary/Chest: Normal effort. Lungs clear to auscultation. No rales, rhonchi or wheezing. Abdominal: Positive bowel sounds. Soft, nontender. Nondistended. Musculoskeletal: Movement x4. Bilateral lower extremity edema. Neurological: Gait normal. Alert and oriented to person, place, and time. Psychiatric: Normal mood and affect. Speech and behavior normal. Judgment and thought content normal. Cognition and memory intact.      Assessment: Diagnosis Orders   1. Essential hypertension     2. Hyperlipidemia, unspecified hyperlipidemia type     3. Obesity, Class III, BMI 40-49.9 (morbid obesity) ([address]     4. TRISHA (obstructive sleep apnea)     5. Gastroesophageal reflux disease without esophagitis     6. Prediabetes     7. Thiamine deficiency         Plan:    No dietitian visit today. Patient was encouraged to journal all food intake. Keep calorie level at approximately 3292-5634. Protein intake is to be a minimum of 60-80 grams per day. Water drinking was encouraged with a goal of 64oz-128oz daily. Beverages to be calorie free except for milk. Every other beverage should be water. Avoid soda. Continue to increase level of physical activity. Encouraged use of exercise log. Needs recheck Vitamin B1. Has order. Weight discussed. Reduce sodium in the diet. Increase lean protein and decrease carbs. Follow-up  Return in about 1 month (around 2/7/2021). Orders this encounter:  No orders of the defined types were placed in this encounter. Prescriptions this encounter:  No orders of the defined types were placed in this encounter.       Electronically signed by:  Mariana Ibarra CNP

## 2021-01-14 DIAGNOSIS — I10 ESSENTIAL HYPERTENSION: ICD-10-CM

## 2021-01-14 RX ORDER — CHLORTHALIDONE 25 MG/1
25 TABLET ORAL DAILY
Qty: 30 TABLET | Refills: 5 | Status: ON HOLD | OUTPATIENT
Start: 2021-01-14 | End: 2021-04-06 | Stop reason: HOSPADM

## 2021-01-14 NOTE — TELEPHONE ENCOUNTER
Gabino Cover is calling to request a refill on the following medication(s):    Medication Request:  Requested Prescriptions      No prescriptions requested or ordered in this encounter       Last Visit Date (If Applicable):  78/3/3552    Next Visit Date:    6/4/2021

## 2021-01-25 ENCOUNTER — HOSPITAL ENCOUNTER (OUTPATIENT)
Dept: LAB | Age: 41
Setting detail: SPECIMEN
Discharge: HOME | End: 2021-01-25
Payer: COMMERCIAL

## 2021-01-25 DIAGNOSIS — Z01.818 PREOP TESTING: Primary | ICD-10-CM

## 2021-01-25 PROCEDURE — U0003 INFECTIOUS AGENT DETECTION BY NUCLEIC ACID (DNA OR RNA); SEVERE ACUTE RESPIRATORY SYNDROME CORONAVIRUS 2 (SARS-COV-2) (CORONAVIRUS DISEASE [COVID-19]), AMPLIFIED PROBE TECHNIQUE, MAKING USE OF HIGH THROUGHPUT TECHNOLOGIES AS DESCRIBED BY CMS-2020-01-R: HCPCS

## 2021-01-25 PROCEDURE — U0005 INFEC AGEN DETEC AMPLI PROBE: HCPCS

## 2021-01-26 ENCOUNTER — NURSE ONLY (OUTPATIENT)
Dept: FAMILY MEDICINE CLINIC | Age: 41
End: 2021-01-26
Payer: COMMERCIAL

## 2021-01-26 DIAGNOSIS — Z11.1 SCREENING FOR TUBERCULOSIS: Primary | ICD-10-CM

## 2021-01-26 LAB
SARS-COV-2, RAPID: NORMAL
SARS-COV-2: NORMAL
SARS-COV-2: NOT DETECTED
SOURCE: NORMAL

## 2021-01-26 PROCEDURE — 86580 TB INTRADERMAL TEST: CPT | Performed by: NURSE PRACTITIONER

## 2021-01-26 NOTE — PROGRESS NOTES
After obtaining consent, and per orders of Palmira Hollingsworth, injection of TB given in Left arm by Quiana Guillaume. Patient instructed to remain in clinic for 20 minutes afterwards, and to report any adverse reaction to me immediately.

## 2021-01-29 ENCOUNTER — HOSPITAL ENCOUNTER (OUTPATIENT)
Age: 41
Setting detail: OUTPATIENT SURGERY
Discharge: HOME | End: 2021-01-29
Attending: SURGERY | Admitting: SURGERY
Payer: COMMERCIAL

## 2021-01-29 ENCOUNTER — ANESTHESIA EVENT (OUTPATIENT)
Dept: ENDOSCOPY | Age: 41
End: 2021-01-29
Payer: COMMERCIAL

## 2021-01-29 ENCOUNTER — ANESTHESIA (OUTPATIENT)
Dept: ENDOSCOPY | Age: 41
End: 2021-01-29
Payer: COMMERCIAL

## 2021-01-29 VITALS
DIASTOLIC BLOOD PRESSURE: 85 MMHG | WEIGHT: 238 LBS | HEART RATE: 80 BPM | SYSTOLIC BLOOD PRESSURE: 116 MMHG | RESPIRATION RATE: 18 BRPM | TEMPERATURE: 97.8 F | BODY MASS INDEX: 40.63 KG/M2 | HEIGHT: 64 IN | OXYGEN SATURATION: 97 %

## 2021-01-29 VITALS
OXYGEN SATURATION: 98 % | RESPIRATION RATE: 22 BRPM | DIASTOLIC BLOOD PRESSURE: 68 MMHG | SYSTOLIC BLOOD PRESSURE: 147 MMHG

## 2021-01-29 PROBLEM — K44.9 HIATAL HERNIA WITH GERD: Status: ACTIVE | Noted: 2020-07-27

## 2021-01-29 LAB
HCG, PREGNANCY URINE (POC): NEGATIVE
INDURATION: 0
TB SKIN TEST: NORMAL

## 2021-01-29 PROCEDURE — 81025 URINE PREGNANCY TEST: CPT

## 2021-01-29 PROCEDURE — 2580000003 HC RX 258: Performed by: NURSE ANESTHETIST, CERTIFIED REGISTERED

## 2021-01-29 PROCEDURE — 7100000010 HC PHASE II RECOVERY - FIRST 15 MIN: Performed by: SURGERY

## 2021-01-29 PROCEDURE — 88305 TISSUE EXAM BY PATHOLOGIST: CPT

## 2021-01-29 PROCEDURE — 2580000003 HC RX 258: Performed by: SURGERY

## 2021-01-29 PROCEDURE — 6360000002 HC RX W HCPCS: Performed by: NURSE ANESTHETIST, CERTIFIED REGISTERED

## 2021-01-29 PROCEDURE — 43239 EGD BIOPSY SINGLE/MULTIPLE: CPT | Performed by: SURGERY

## 2021-01-29 PROCEDURE — 3700000000 HC ANESTHESIA ATTENDED CARE: Performed by: SURGERY

## 2021-01-29 PROCEDURE — 2709999900 HC NON-CHARGEABLE SUPPLY: Performed by: SURGERY

## 2021-01-29 PROCEDURE — 2500000003 HC RX 250 WO HCPCS: Performed by: NURSE ANESTHETIST, CERTIFIED REGISTERED

## 2021-01-29 PROCEDURE — 3609012400 HC EGD TRANSORAL BIOPSY SINGLE/MULTIPLE: Performed by: SURGERY

## 2021-01-29 PROCEDURE — 7100000011 HC PHASE II RECOVERY - ADDTL 15 MIN: Performed by: SURGERY

## 2021-01-29 RX ORDER — FENTANYL CITRATE 50 UG/ML
25 INJECTION, SOLUTION INTRAMUSCULAR; INTRAVENOUS EVERY 5 MIN PRN
Status: DISCONTINUED | OUTPATIENT
Start: 2021-01-29 | End: 2021-01-29 | Stop reason: HOSPADM

## 2021-01-29 RX ORDER — OXYCODONE HYDROCHLORIDE AND ACETAMINOPHEN 5; 325 MG/1; MG/1
1 TABLET ORAL PRN
Status: DISCONTINUED | OUTPATIENT
Start: 2021-01-29 | End: 2021-01-29 | Stop reason: HOSPADM

## 2021-01-29 RX ORDER — OXYCODONE HYDROCHLORIDE AND ACETAMINOPHEN 5; 325 MG/1; MG/1
2 TABLET ORAL PRN
Status: DISCONTINUED | OUTPATIENT
Start: 2021-01-29 | End: 2021-01-29 | Stop reason: HOSPADM

## 2021-01-29 RX ORDER — SODIUM CHLORIDE 9 MG/ML
INJECTION, SOLUTION INTRAVENOUS CONTINUOUS
Status: DISCONTINUED | OUTPATIENT
Start: 2021-01-29 | End: 2021-01-29 | Stop reason: HOSPADM

## 2021-01-29 RX ORDER — LIDOCAINE HYDROCHLORIDE 10 MG/ML
INJECTION, SOLUTION EPIDURAL; INFILTRATION; INTRACAUDAL; PERINEURAL PRN
Status: DISCONTINUED | OUTPATIENT
Start: 2021-01-29 | End: 2021-01-29 | Stop reason: SDUPTHER

## 2021-01-29 RX ORDER — DEXAMETHASONE SODIUM PHOSPHATE 10 MG/ML
4 INJECTION INTRAMUSCULAR; INTRAVENOUS
Status: DISCONTINUED | OUTPATIENT
Start: 2021-01-29 | End: 2021-01-29 | Stop reason: HOSPADM

## 2021-01-29 RX ORDER — LABETALOL HYDROCHLORIDE 5 MG/ML
5 INJECTION, SOLUTION INTRAVENOUS EVERY 10 MIN PRN
Status: DISCONTINUED | OUTPATIENT
Start: 2021-01-29 | End: 2021-01-29 | Stop reason: HOSPADM

## 2021-01-29 RX ORDER — DIPHENHYDRAMINE HYDROCHLORIDE 50 MG/ML
12.5 INJECTION INTRAMUSCULAR; INTRAVENOUS
Status: DISCONTINUED | OUTPATIENT
Start: 2021-01-29 | End: 2021-01-29 | Stop reason: HOSPADM

## 2021-01-29 RX ORDER — PROPOFOL 10 MG/ML
INJECTION, EMULSION INTRAVENOUS PRN
Status: DISCONTINUED | OUTPATIENT
Start: 2021-01-29 | End: 2021-01-29 | Stop reason: SDUPTHER

## 2021-01-29 RX ORDER — SODIUM CHLORIDE 450 MG/100ML
INJECTION, SOLUTION INTRAVENOUS CONTINUOUS PRN
Status: DISCONTINUED | OUTPATIENT
Start: 2021-01-29 | End: 2021-01-29 | Stop reason: SDUPTHER

## 2021-01-29 RX ORDER — MEPERIDINE HYDROCHLORIDE 50 MG/ML
12.5 INJECTION INTRAMUSCULAR; INTRAVENOUS; SUBCUTANEOUS EVERY 5 MIN PRN
Status: DISCONTINUED | OUTPATIENT
Start: 2021-01-29 | End: 2021-01-29 | Stop reason: HOSPADM

## 2021-01-29 RX ORDER — ONDANSETRON 2 MG/ML
4 INJECTION INTRAMUSCULAR; INTRAVENOUS
Status: DISCONTINUED | OUTPATIENT
Start: 2021-01-29 | End: 2021-01-29 | Stop reason: HOSPADM

## 2021-01-29 RX ORDER — HYDRALAZINE HYDROCHLORIDE 20 MG/ML
5 INJECTION INTRAMUSCULAR; INTRAVENOUS EVERY 10 MIN PRN
Status: DISCONTINUED | OUTPATIENT
Start: 2021-01-29 | End: 2021-01-29 | Stop reason: HOSPADM

## 2021-01-29 RX ADMIN — PROPOFOL 50 MG: 10 INJECTION, EMULSION INTRAVENOUS at 08:54

## 2021-01-29 RX ADMIN — SODIUM CHLORIDE: 4.5 INJECTION, SOLUTION INTRAVENOUS at 08:47

## 2021-01-29 RX ADMIN — LIDOCAINE HYDROCHLORIDE 5 ML: 10 INJECTION, SOLUTION EPIDURAL; INFILTRATION; INTRACAUDAL; PERINEURAL at 08:51

## 2021-01-29 RX ADMIN — PROPOFOL 50 MG: 10 INJECTION, EMULSION INTRAVENOUS at 08:56

## 2021-01-29 RX ADMIN — SODIUM CHLORIDE: 9 INJECTION, SOLUTION INTRAVENOUS at 08:40

## 2021-01-29 RX ADMIN — PROPOFOL 150 MG: 10 INJECTION, EMULSION INTRAVENOUS at 08:51

## 2021-01-29 ASSESSMENT — PAIN SCALES - GENERAL
PAINLEVEL_OUTOF10: 0

## 2021-01-29 NOTE — ANESTHESIA POSTPROCEDURE EVALUATION
Department of Anesthesiology  Postprocedure Note    Patient: Emerson Vaughn  MRN: [id number]  YOB: 1980  Date of evaluation: 1/29/2021  Time:  12:32 PM     Procedure Summary     Date: 01/29/21 Room / Location: [address]    Anesthesia Start: 1469 Anesthesia Stop: 0959    Procedure: EGD BIOPSY (N/A ) Diagnosis: (GERD, OBESITY)    Surgeons: Janice Hummel DO Responsible Provider: Graciela Chong MD    Anesthesia Type: general, MAC ASA Status: 3          Anesthesia Type: general, MAC    Wong Phase I: Wong Score: 10    Wong Phase II: Wong Score: 10    Last vitals: Reviewed and per EMR flowsheets.        Anesthesia Post Evaluation    Patient location during evaluation: PACU  Patient participation: complete - patient participated  Level of consciousness: awake and alert  Pain score: 0  Airway patency: patent  Nausea & Vomiting: no nausea and no vomiting  Complications: no  Cardiovascular status: blood pressure returned to baseline  Respiratory status: acceptable  Hydration status: euvolemic

## 2021-01-29 NOTE — ANESTHESIA PRE PROCEDURE
Department of Anesthesiology  Preprocedure Note       Name:  Leelee Avila   Age:  41 y.o.  :  1980                                          MRN:  [id number]         Date:  2021      Surgeon: Katiana Fabian):  Wili Pena DO    Procedure: Procedure(s):  EGD ESOPHAGOGASTRODUODENOSCOPY    Medications prior to admission:   Prior to Admission medications    Medication Sig Start Date End Date Taking? Authorizing Provider   chlorthalidone (HYGROTON) 25 MG tablet Take 1 tablet by mouth daily 21   LUZ Uriostegui - CNP   potassium chloride (KLOR-CON M) 20 MEQ extended release tablet Take 2 tablets by mouth daily 21   LUZ Fisher - CNP   amLODIPine (NORVASC) 5 MG tablet Take 1 tablet by mouth daily 10/19/20   LUZ Uriostegui - CNP   losartan (COZAAR) 100 MG tablet Take 1 tablet by mouth daily 10/9/20   Ana Camarillo, LUZ - CNP   Metoprolol Succinate 25 MG CS24 Take by mouth 1/2 tab daily    Historical Provider, MD       Current medications:    Current Facility-Administered Medications   Medication Dose Route Frequency Provider Last Rate Last Admin    0.9 % sodium chloride infusion   Intravenous Continuous Wili Pena DO           Allergies:  No Known Allergies    Problem List:    Patient Active Problem List   Diagnosis Code    Hyperlipidemia E78.5    Essential hypertension I10    Hypokalemia E87.6    Vitamin D deficiency E55.9    Obesity (BMI 30-39. 9) E66.9    Gastroesophageal reflux disease without esophagitis K21.9    Prediabetes R73.03    Thiamine deficiency E51.9    TRISHA (obstructive sleep apnea) G47.33    Obesity, Class III, BMI 40-49.9 (morbid obesity) ([facility]) E66.01       Past Medical History:        Diagnosis Date    GERD (gastroesophageal reflux disease)     Hyperlipidemia     Hypertension     Major depressive disorder, recurrent episode, mild ([facility] [address] 10/21/2015    Obesity, Class III, BMI 40-49.9 (morbid obesity) ([facility] [address]  TRISHA (obstructive sleep apnea)     Pre-diabetes        Past Surgical History:        Procedure Laterality Date     SECTION      x1       Social History:    Social History     Tobacco Use    Smoking status: Never Smoker    Smokeless tobacco: Never Used   Substance Use Topics    Alcohol use: No     Alcohol/week: 0.0 standard drinks                                Counseling given: Not Answered      Vital Signs (Current):   Vitals:    21 0834   BP: (!) 132/93   Pulse: 89   Resp: 28   Temp: 97.5 °F (36.4 °C)   TempSrc: Infrared   SpO2: 100%   Weight: 238 lb (108 kg)   Height: 5' 4\" (1.626 m)                                              BP Readings from Last 3 Encounters:   21 (!) 132/93   21 126/76   12/15/20 104/74       NPO Status:                                                                                 BMI:   Wt Readings from Last 3 Encounters:   21 238 lb (108 kg)   21 235 lb (106.6 kg)   12/15/20 231 lb (104.8 kg)     Body mass index is 40.85 kg/m². CBC:   Lab Results   Component Value Date    WBC 7.1 2020    RBC 3.91 2020    HGB 10.7 2020    HCT 34.2 2020    MCV 87.5 2020    RDW 13.0 2020     2020       CMP:   Lab Results   Component Value Date     2020    K 3.2 2020    CL 97 2020    CO2 26 2020    BUN 16 2020    CREATININE 0.90 2020    GFRAA >60 2020    LABGLOM >60 2020    GLUCOSE 90 2020    PROT 7.9 2020    CALCIUM 9.0 2020    BILITOT 0.35 2020    ALKPHOS 50 2020    AST 15 2020    ALT 11 2020       POC Tests: No results for input(s): POCGLU, POCNA, POCK, POCCL, POCBUN, POCHEMO, POCHCT in the last 72 hours.     Coags:   Lab Results   Component Value Date    PROTIME 9.3 2015    INR 0.9 2015    APTT 28.6 2015       HCG (If Applicable):   Lab Results   Component Value Date PREGTESTUR Positive 12/23/2014        ABGs: No results found for: PHART, PO2ART, QJG4OMR, OQW0GCG, BEART, Y5GWVBTS     Type & Screen (If Applicable):  No results found for: LABABO, LABRH    Drug/Infectious Status (If Applicable):  No results found for: HIV, HEPCAB    COVID-19 Screening (If Applicable):   Lab Results   Component Value Date    COVID19 Not Detected 01/25/2021    COVID19 Not Detected 08/02/2020         Anesthesia Evaluation  Patient summary reviewed  Airway: Mallampati: II  TM distance: >3 FB   Neck ROM: full  Mouth opening: > = 3 FB Dental: normal exam         Pulmonary:normal exam  breath sounds clear to auscultation                             Cardiovascular:  Exercise tolerance: good (>4 METS),           Rhythm: regular  Rate: normal                    Neuro/Psych:               GI/Hepatic/Renal:             Endo/Other:                     Abdominal:       Abdomen: soft. Vascular:                                        Anesthesia Plan      general and MAC     ASA 3       Induction: intravenous. MIPS: Postoperative opioids intended and Prophylactic antiemetics administered. Anesthetic plan and risks discussed with patient. Use of blood products discussed with patient whom consented to blood products. Plan discussed with attending and CRNA.     Attending anesthesiologist reviewed and agrees with Catherine Cooper MD   1/29/2021

## 2021-01-29 NOTE — OP NOTE
Preoperative Diagnosis:  GERD, Morbid obesity, BMI of Body mass index is 40.85 kg/m². Postoperative Diagnosis: GERD with 3-4 cm hiatal hernia, Morbid obesity, BMI of Body mass index is 40.85 kg/m². Procedure: Esophagogastroduodenoscopy with Biopsy    Surgeon: Chris Cleary DO    Assistant:    Anesthesia: MAC, see anesthesia records    Specimen:    1) Antrum for H. Pylori    Findings: 3-4 cm hiatal hernia, no esophagitis    EBL: NONE    Operative Narrative: The risks and benefits were explained in detail to the patient who agreed and consented to the procedure. The patient was taken to the endoscopic suite and placed in a lateral position. Oxygen was administered via nasal cannula and a mouth guard was placed. MAC was administered via the anesthetic team.      The endoscope was then advanced into the oropharynx and down into the esophagus under direct visualization. The scope was further advanced through the esophagus, GE junction and stomach to the pylorus under visualization. The scope was passed through the pylorus and duodenal sweep performed, advancing and visualizing to the second portion of the duodenum. The scope was then withdrawn to the antrum and cold forceps were used to take biopsies of the antrum for H. Pylori. Appropriate hemostasis was noted. The scope was then retroflexed to visualize the GE junction. Evidence of a hiatal hernia was noted. The scope was then slowly withdrawn through the GE junction. The Z line was noted. The stomach was then decompressed. The scope was withdrawn from the esophagus and no further lesions noted. The scope was withdrawn. The patient tolerated the procedure well. She will follow up with the Bariatric Clinic for further assessment.

## 2021-02-01 LAB — SURGICAL PATHOLOGY REPORT: NORMAL

## 2021-02-05 ENCOUNTER — OFFICE VISIT (OUTPATIENT)
Dept: BARIATRICS/WEIGHT MGMT | Age: 41
End: 2021-02-05
Payer: COMMERCIAL

## 2021-02-05 VITALS
HEIGHT: 64 IN | HEART RATE: 74 BPM | BODY MASS INDEX: 38.76 KG/M2 | RESPIRATION RATE: 20 BRPM | DIASTOLIC BLOOD PRESSURE: 78 MMHG | SYSTOLIC BLOOD PRESSURE: 126 MMHG | WEIGHT: 227 LBS

## 2021-02-05 DIAGNOSIS — I10 ESSENTIAL HYPERTENSION: Primary | ICD-10-CM

## 2021-02-05 DIAGNOSIS — K21.9 HIATAL HERNIA WITH GERD: ICD-10-CM

## 2021-02-05 DIAGNOSIS — E78.5 HYPERLIPIDEMIA, UNSPECIFIED HYPERLIPIDEMIA TYPE: ICD-10-CM

## 2021-02-05 DIAGNOSIS — K44.9 HIATAL HERNIA WITH GERD: ICD-10-CM

## 2021-02-05 DIAGNOSIS — R73.03 PREDIABETES: ICD-10-CM

## 2021-02-05 DIAGNOSIS — G47.33 OSA (OBSTRUCTIVE SLEEP APNEA): ICD-10-CM

## 2021-02-05 DIAGNOSIS — E66.9 OBESITY (BMI 30-39.9): ICD-10-CM

## 2021-02-05 PROCEDURE — 99213 OFFICE O/P EST LOW 20 MIN: CPT | Performed by: NURSE PRACTITIONER

## 2021-02-05 PROCEDURE — G8417 CALC BMI ABV UP PARAM F/U: HCPCS | Performed by: NURSE PRACTITIONER

## 2021-02-05 PROCEDURE — G8427 DOCREV CUR MEDS BY ELIG CLIN: HCPCS | Performed by: NURSE PRACTITIONER

## 2021-02-05 PROCEDURE — 1036F TOBACCO NON-USER: CPT | Performed by: NURSE PRACTITIONER

## 2021-02-05 PROCEDURE — G8484 FLU IMMUNIZE NO ADMIN: HCPCS | Performed by: NURSE PRACTITIONER

## 2021-02-05 NOTE — PROGRESS NOTES
Medical Weight Management Progress Note    Subjective      Patient being seen for medically supervised weight loss for the chronic conditions of HTN, GERD, Anxiety, Prediabetes, HLD. She is working on the behavior changes discussed at the initial appointment. Patient continues on diet plan. Physical activity includes walking. Weight loss of 8 lbs since last visit. Using CPAP. Psych eval completed and clearance received. EGD completed and revealed hiatal hernia. H Pylori negative. Received cardiac and pulmonary clearances. No current issues. Working toward bariatric surgery:    [x] Sleeve Gastrectomy                                                           [] Deepak-en-Y Gastric Bypass    Allergies:  No Known Allergies    Past Medical History:     Past Medical History:   Diagnosis Date    GERD (gastroesophageal reflux disease)     Hyperlipidemia     Hypertension     Major depressive disorder, recurrent episode, mild ([facility] [address] 10/21/2015    Obesity, Class III, BMI 40-49.9 (morbid obesity) ([facility] [address]     TRISHA (obstructive sleep apnea)     Pre-diabetes    .     Past Surgical History:  Past Surgical History:   Procedure Laterality Date     SECTION      x1    UPPER GASTROINTESTINAL ENDOSCOPY N/A 2021    EGD BIOPSY performed by Boni Vazquez DO at [facility]       Family History:  Family History   Problem Relation Age of Onset    Hypertension Mother     Asthma Mother     Hypertension Maternal Aunt     Asthma Maternal Uncle     Hypertension Maternal Uncle     Arthritis Maternal Grandmother     Hypertension Maternal Grandmother     Stroke Maternal Grandmother     Breast Cancer Neg Hx     Cancer Neg Hx     Colon Cancer Neg Hx     Diabetes Neg Hx     Eclampsia Neg Hx     Ovarian Cancer Neg Hx      Labor Neg Hx     Spont Abortions Neg Hx        Social History:  Social History     Socioeconomic History    Marital status: Single     Spouse name: Not on file    Number (Site: Right Upper Arm, Position: Sitting, Cuff Size: Large Adult)   Pulse 74   Resp 20   Ht 5' 4\" (1.626 m)   Wt 227 lb (103 kg)   BMI 38.96 kg/m²     BMI/Height/Weight:  Body mass index is 38.96 kg/m². Review of Systems - A review of systems was performed. All was negative unless otherwise documented in HPI. Constitutional: Negative for fever, chills and diaphoresis. HENT: Negative for hearing loss and trouble swallowing. Eyes: Negative for photophobia and visual disturbance. Respiratory: Negative for cough, shortness of breath and wheezing. Cardiovascular: Negative for chest pain and palpitations. Gastrointestinal: Negative for nausea, vomiting, abdominal pain, diarrhea, constipation, blood in stool and abdominal distention. Endocrine: Negative for polydipsia, polyphagia and polyuria. Genitourinary: Negative for dysuria, frequency, hematuria and difficulty urinating. Musculoskeletal: Negative for myalgias, joint swelling. Skin: Negative for pallor and rash. Neurological: Negative for dizziness, tremors, light-headedness and headaches. Psychiatric/Behavioral: Negative for sleep disturbance and dysphoric mood. Objective:      Physical Exam   Vital signs reviewed. General: Well-developed and well-nourished. No acute distress. Skin: Warm, dry and intact. HEENT: Normocephalic. EOMs intact. Conjunctivae normal. Neck supple. Cardiovascular: Normal rate, regular rhythm. Pulmonary/Chest: Normal effort. Lungs clear to auscultation. No rales, rhonchi or wheezing. Abdominal: Positive bowel sounds. Soft, nontender. Nondistended. Musculoskeletal: Movement x4. Bilateral lower extremity edema. Neurological: Gait normal. Alert and oriented to person, place, and time. Psychiatric: Normal mood and affect. Speech and behavior normal. Judgment and thought content normal. Cognition and memory intact. Assessment:       Diagnosis Orders   1. Essential hypertension     2. Hyperlipidemia, unspecified hyperlipidemia type     3. Hiatal hernia with GERD     4. TRISHA (obstructive sleep apnea)     5. Obesity (BMI 30-39.9)     6. Prediabetes         Plan:    Dietitian visit today. Patient was encouraged to journal all food intake. Keep calorie level at approximately 2551-2104. Protein intake is to be a minimum of 60-80 grams per day. Water drinking was encouraged with a goal of 64oz-128oz daily. Beverages to be calorie free except for milk. Every other beverage should be water. Avoid soda. Continue to increase level of physical activity. Encouraged use of exercise log. Follow-up  No follow-ups on file. Orders this encounter:  No orders of the defined types were placed in this encounter. Prescriptions this encounter:  No orders of the defined types were placed in this encounter.       Electronically signed by:  Yolanda Hensley CNP

## 2021-02-18 ENCOUNTER — TELEPHONE (OUTPATIENT)
Dept: BARIATRICS/WEIGHT MGMT | Age: 41
End: 2021-02-18

## 2021-02-18 NOTE — TELEPHONE ENCOUNTER
Patient's record has been submitted to the insurance company on 2/18/21 for authorization to schedule surgery. Instructions to patient: if patient calls for status on authorization to schedule surgery please instruct patient that it could take up to 30 days for an authorization. Once authorization is received the insurance specialists will contact the patient to schedule their procedure.       Program fee paid in full yes

## 2021-03-01 ENCOUNTER — TELEPHONE (OUTPATIENT)
Dept: BARIATRICS/WEIGHT MGMT | Age: 41
End: 2021-03-01

## 2021-03-16 ENCOUNTER — TELEPHONE (OUTPATIENT)
Dept: BARIATRICS/WEIGHT MGMT | Age: 41
End: 2021-03-16

## 2021-03-16 DIAGNOSIS — R94.2 ABNORMAL PFTS (PULMONARY FUNCTION TESTS): ICD-10-CM

## 2021-03-16 DIAGNOSIS — J30.9 ALLERGIC RHINITIS, UNSPECIFIED SEASONALITY, UNSPECIFIED TRIGGER: ICD-10-CM

## 2021-03-16 DIAGNOSIS — E87.6 HYPOKALEMIA: ICD-10-CM

## 2021-03-16 NOTE — LETTER
[facility]  [address]    3/19/2021    [address] [address]        Dear Deirdre Woods : In addition to helping you feel better when you are sick, we are interested in preventing illness and injury in the first place. In the spirit of maintaining your good health, your record indicates that you are due for a repeat potassium. I look forward to seeing you soon.     Sincerely,           Amado Beyer, CNP/mb

## 2021-03-16 NOTE — TELEPHONE ENCOUNTER
Called back wants 6 weeks off    [facility] paperwork was received. Blank copy of forms has been scanned. Patient notified it will take up to 3-5 business days for completion.

## 2021-03-16 NOTE — TELEPHONE ENCOUNTER
I left a message on patients recorder to see if she is still taking Singulair.  It was removed from her medication list on 1/29/21

## 2021-03-18 RX ORDER — POTASSIUM CHLORIDE 20 MEQ/1
40 TABLET, EXTENDED RELEASE ORAL DAILY
Qty: 60 TABLET | Refills: 5 | Status: SHIPPED | OUTPATIENT
Start: 2021-03-18 | End: 2021-04-01

## 2021-03-18 RX ORDER — MONTELUKAST SODIUM 10 MG/1
10 TABLET ORAL DAILY
Qty: 30 TABLET | Refills: 5 | Status: SHIPPED | OUTPATIENT
Start: 2021-03-18 | End: 2021-09-17

## 2021-03-22 ENCOUNTER — NURSE ONLY (OUTPATIENT)
Dept: BARIATRICS/WEIGHT MGMT | Age: 41
End: 2021-03-22

## 2021-03-22 NOTE — PROGRESS NOTES
Pt attended Bariatric Surgery pre-op class. Pt verbalized understanding and signed Surgical Patient Agreement. week pre-op diet reviewed. Vitamin, calcium and protein shake use reviewed. Post- op diet Phases 1 to 4 covered. Pt allowed the opportunity to ask questions.       Instructions on ERAS protocol included

## 2021-03-25 ENCOUNTER — OFFICE VISIT (OUTPATIENT)
Dept: BARIATRICS/WEIGHT MGMT | Age: 41
End: 2021-03-25
Payer: COMMERCIAL

## 2021-03-25 VITALS
BODY MASS INDEX: 40.63 KG/M2 | DIASTOLIC BLOOD PRESSURE: 72 MMHG | RESPIRATION RATE: 20 BRPM | WEIGHT: 238 LBS | HEIGHT: 64 IN | HEART RATE: 76 BPM | SYSTOLIC BLOOD PRESSURE: 118 MMHG

## 2021-03-25 DIAGNOSIS — G47.33 OSA (OBSTRUCTIVE SLEEP APNEA): Primary | ICD-10-CM

## 2021-03-25 DIAGNOSIS — K21.9 GASTROESOPHAGEAL REFLUX DISEASE WITHOUT ESOPHAGITIS: ICD-10-CM

## 2021-03-25 DIAGNOSIS — E66.01 MORBID OBESITY WITH BMI OF 40.0-44.9, ADULT (HCC): ICD-10-CM

## 2021-03-25 PROCEDURE — G8484 FLU IMMUNIZE NO ADMIN: HCPCS | Performed by: SURGERY

## 2021-03-25 PROCEDURE — 99212 OFFICE O/P EST SF 10 MIN: CPT | Performed by: SURGERY

## 2021-03-25 PROCEDURE — G8427 DOCREV CUR MEDS BY ELIG CLIN: HCPCS | Performed by: SURGERY

## 2021-03-25 PROCEDURE — 1036F TOBACCO NON-USER: CPT | Performed by: SURGERY

## 2021-03-25 PROCEDURE — G8417 CALC BMI ABV UP PARAM F/U: HCPCS | Performed by: SURGERY

## 2021-03-26 ENCOUNTER — TELEPHONE (OUTPATIENT)
Dept: BARIATRICS/WEIGHT MGMT | Age: 41
End: 2021-03-26

## 2021-03-28 NOTE — PROGRESS NOTES
[facility]  [facility]  [address]  [address]  [place] [address]  Dept: [phone]    SURGICAL WEIGHT MANAGEMENT PROGRAM   PROGRESS NOTE - SURGICAL EVALUATION    CC: Weight Loss     Patient: Ale Henry Date: 3/25/2021       Medical Record #: [id number]    Patient History/Assessment Summary:    The patient is a pleasant 41y.o. year old female  with morbid obesity, who stands Height: 5' 4\" (162.6 cm) tall with a weight of Weight: 238 lb (108 kg) , resulting in a BMI of Body mass index is 40.85 kg/m². .     This patient is alone for the evaluation today. The patient is being evaluated to undergo weight loss surgery to treat the following comorbid conditions caused by her morbid obesity: Type 2 Diabetes Mellitus, Hypertension, Hyperlipidemia, Obstructive Sleep Apnea treated with BiPAP/CPAP, GERD and Depression    She attended the weight loss surgery seminar, and attended bariatric education    Last Visit Weight:   Wt Readings from Last 3 Encounters:   03/25/21 238 lb (108 kg)   02/05/21 227 lb (103 kg)   01/29/21 238 lb (108 kg)     Today's weight is decreased from the last visit. Highest Weight: 238    The patient is being evaluated for Laparoscopic Sleeve Gastrectomy. She  is here today to review the details of surgery     The patient acknowledges and understands the risks, benefits, and options we have discussed, as outlined in the Additional Informed Consent for this procedure. Patient also understands the importance of surgical and post-operative recommendations, including the operative diet and regular post-operative follow up care. The importance of ambulation and incentive spirometry was also discussed. All questions of this patient and any family members present have been answered to their satisfaction.     Physical Examination:     /72 (Site: Right Upper Arm, Position: Sitting, Cuff Size: Large Adult)   Pulse 76   Resp 20   Ht 5' 4\" (1.626 m)   Wt 238 lb (108 kg)   BMI 40.85 kg/m²   General This patient is awake, alert, and oriented, and is in no apparent distress. Cardiac Regular rate and rhythm without evidence of murmur. Respiratory Clear to auscultation bilaterally. Abdomen Obese, soft, non-tender, non-distended without masses/ No  evidence of abdominal hernia / Incisions consistent with previous surgeries. Head and Neck Obese, normocephalic and atraumatic/soft and supple, no  lymphadenopathy or obvious bruits. Extremeties No cyanosis, clubbing or edema/ No calf tenderness/No  restrictions of movement, is ambulatory without assistance. Neurological Intact x 4 extremities, no focal deficits noted   Skin No rashes or lesions noted   Rectal Deferred     RECOMMENDATIONS:       Diagnosis Orders   1. TRISHA (obstructive sleep apnea)     2. Gastroesophageal reflux disease without esophagitis     3. Morbid obesity with BMI of 40.0-44.9, adult ([facility] [address]            We spent a great deal of time discussing the risks and benefits of Laparoscopic Sleeve Gastrectomy, including but not limited to injury to intra-abdominal organs, breakdown of the gastric staple line, the need for re-operative therapy,  prolonged hospitalization,  mechanical ventilation,  and death. We discussed the possibility of bleeding, the need for blood transfusions, blood clots, hospital-acquired and intra-abdominal infection, anastomotic stricture, and worsening GERD. And we discussed the need for post-operative visit compliance, behavior modifications and diet changes, protein and vitamin supplementation, as well as routine scheduled and dedicated exercise. We discussed the potential weight loss benefit of approximately 50-60% of her excess body weight at 12-18 months post-op, as well as the possibility of insufficient weight loss or weight gain after 2 years post-operative time.      The following was discussed with the patient:    DVT Prophylaxis    Risk of worsening GERD or inadequate weight loss with sleeve gastrectomy    Need for CPAP compliance with sleep apnea    Electronically signed by Wili Pena DO on 3/28/2021 at 1:42 PM    Please note that this chart was generated using voice recognition Dragon dictation software. Although every effort was made to ensure the accuracy of this automated transcription, some errors in transcription may have occurred.

## 2021-03-29 ENCOUNTER — HOSPITAL ENCOUNTER (OUTPATIENT)
Dept: GENERAL RADIOLOGY | Age: 41
Discharge: HOME | End: 2021-03-31
Payer: COMMERCIAL

## 2021-03-29 ENCOUNTER — HOSPITAL ENCOUNTER (OUTPATIENT)
Dept: PREADMISSION TESTING | Age: 41
Discharge: HOME | End: 2021-04-02
Payer: COMMERCIAL

## 2021-03-29 VITALS
OXYGEN SATURATION: 98 % | BODY MASS INDEX: 40.12 KG/M2 | HEART RATE: 89 BPM | RESPIRATION RATE: 20 BRPM | SYSTOLIC BLOOD PRESSURE: 139 MMHG | TEMPERATURE: 96.4 F | DIASTOLIC BLOOD PRESSURE: 87 MMHG | HEIGHT: 64 IN | WEIGHT: 235 LBS

## 2021-03-29 LAB
ANION GAP SERPL CALCULATED.3IONS-SCNC: 11 MMOL/L (ref 9–17)
BUN BLDV-MCNC: 19 MG/DL (ref 6–20)
BUN/CREAT BLD: ABNORMAL (ref 9–20)
CALCIUM SERPL-MCNC: 9.6 MG/DL (ref 8.6–10.4)
CHLORIDE BLD-SCNC: 95 MMOL/L (ref 98–107)
CO2: 29 MMOL/L (ref 20–31)
CREAT SERPL-MCNC: 0.73 MG/DL (ref 0.5–0.9)
GFR AFRICAN AMERICAN: >60 ML/MIN
GFR NON-AFRICAN AMERICAN: >60 ML/MIN
GFR SERPL CREATININE-BSD FRML MDRD: ABNORMAL ML/MIN/{1.73_M2}
GFR SERPL CREATININE-BSD FRML MDRD: ABNORMAL ML/MIN/{1.73_M2}
GLUCOSE BLD-MCNC: 86 MG/DL (ref 70–99)
HCT VFR BLD CALC: 33.8 % (ref 36.3–47.1)
HEMOGLOBIN: 10.5 G/DL (ref 11.9–15.1)
INR BLD: 1
MCH RBC QN AUTO: 26 PG (ref 25.2–33.5)
MCHC RBC AUTO-ENTMCNC: 31.1 G/DL (ref 28.4–34.8)
MCV RBC AUTO: 83.7 FL (ref 82.6–102.9)
NRBC AUTOMATED: 0 PER 100 WBC
PARTIAL THROMBOPLASTIN TIME: 24.5 SEC (ref 20.5–30.5)
PDW BLD-RTO: 14.1 % (ref 11.8–14.4)
PLATELET # BLD: 456 K/UL (ref 138–453)
PMV BLD AUTO: 9.1 FL (ref 8.1–13.5)
POTASSIUM SERPL-SCNC: 3.2 MMOL/L (ref 3.7–5.3)
PROTHROMBIN TIME: 10.3 SEC (ref 9.1–12.3)
RBC # BLD: 4.04 M/UL (ref 3.95–5.11)
SODIUM BLD-SCNC: 135 MMOL/L (ref 135–144)
WBC # BLD: 11.7 K/UL (ref 3.5–11.3)

## 2021-03-29 PROCEDURE — 93005 ELECTROCARDIOGRAM TRACING: CPT | Performed by: SURGERY

## 2021-03-29 PROCEDURE — 85610 PROTHROMBIN TIME: CPT

## 2021-03-29 PROCEDURE — 80048 BASIC METABOLIC PNL TOTAL CA: CPT

## 2021-03-29 PROCEDURE — 85730 THROMBOPLASTIN TIME PARTIAL: CPT

## 2021-03-29 PROCEDURE — 85027 COMPLETE CBC AUTOMATED: CPT

## 2021-03-29 PROCEDURE — G0480 DRUG TEST DEF 1-7 CLASSES: HCPCS

## 2021-03-29 PROCEDURE — 71046 X-RAY EXAM CHEST 2 VIEWS: CPT

## 2021-03-29 PROCEDURE — 36415 COLL VENOUS BLD VENIPUNCTURE: CPT

## 2021-03-29 RX ORDER — SODIUM CHLORIDE, SODIUM LACTATE, POTASSIUM CHLORIDE, CALCIUM CHLORIDE 600; 310; 30; 20 MG/100ML; MG/100ML; MG/100ML; MG/100ML
1000 INJECTION, SOLUTION INTRAVENOUS CONTINUOUS
Status: CANCELLED | OUTPATIENT
Start: 2021-03-29

## 2021-03-29 NOTE — PROGRESS NOTES
Anesthesia Focused Assessment    Has patient ever tested positive for COVID? No    STOP-BANG Sleep Apnea Questionnaire    SNORE loudly (heard through closed doors)? Yes  TIRED, fatigued, sleepy during daytime? Yes  OBSERVED stopping breathing during sleep? Yes  High blood PRESSURE being treated? Yes    BMI over 35? Yes  AGE over 48? No  NECK circumference over 16\"? Yes  GENDER (male)? No             Total 6  High risk 5-8  Intermediate risk 3-4  Low risk 0-2    Obstructive Sleep Apnea: yes  If YES, machine used: cpap-instructed to bring day of surgery. Type 1 DM:   no  T2DM:  prediabetes    Coronary Artery Disease:  no  Hypertension:  yes    Active smoker:  no  Drinks Alcohol:  no    Dentition: benign    Defib / AICD / Pacemaker: no      Renal Failure/dialysis:  no    Patient was evaluated in PAT & anesthesia guidelines were applied. NPO guidelines, medication instructions and scheduled arrival time were reviewed with patient. Hx of anesthesia complications:  no  Family hx of anesthesia complications:  Aunt-prolonged emergence from general anesthesia. Anesthesia contacted:   no  Medical or cardiac clearance ordered: pcp clearance pending. Cardiac clearance granted per chart, will request copies for chart of records.     Colby Leahy PA-C  3/29/21  2:59 PM

## 2021-03-29 NOTE — H&P (VIEW-ONLY)
History and Physical    Pt Name: Dillon Bethea  MRN: [id number]  YOB: 1980  Date of evaluation: 3/29/2021    SUBJECTIVE:   History of Chief Complaint:    Patient presents for PAT appointment, complains of obesity, difficulty with ability to lose weight and maintain that weight loss. She has tried several programs in the past without lasting success. She has several comorbid conditions that would be helped with weight loss as well. She has been following with bariatrics and has been scheduled for sleeve gastrectomy. Past Medical History    has a past medical history of GERD (gastroesophageal reflux disease), Hyperlipidemia, Hypertension, Major depressive disorder, recurrent episode, mild ([address] Obesity, Class III, BMI 40-49.9 (morbid obesity) ([facility] [address] TRISHA (obstructive sleep apnea), Pre-diabetes, Presence of tooth-root and mandibular implants, Tachycardia, Wears contact lenses, and Wellness examination. Past Surgical History   has a past surgical history that includes  section; Upper gastrointestinal endoscopy (N/A, 2021); and Tonsillectomy. Medications  Prior to Admission medications    Medication Sig Start Date End Date Taking?  Authorizing Provider   Multiple Vitamins-Minerals (HM MULTIVITAMIN ADULT GUMMY) CHEW Take by mouth   Yes Historical Provider, MD   potassium chloride (KLOR-CON M) 20 MEQ extended release tablet TAKE 2 TABLETS BY MOUTH DAILY 3/18/21  Yes LUZ Yuen CNP   montelukast (SINGULAIR) 10 MG tablet TAKE 1 TABLET BY MOUTH DAILY 3/18/21  Yes LUZ Manuel CNP   Cholecalciferol (VITAMIN D-3 PO) Take by mouth Daily   Yes Historical Provider, MD   amLODIPine (NORVASC) 5 MG tablet Take 1 tablet by mouth daily 10/19/20  Yes LUZ Ames CNP   losartan (COZAAR) 100 MG tablet Take 1 tablet by mouth daily 10/9/20  Yes LUZ Manuel CNP   Metoprolol Succinate 25 MG CS24 Take by mouth 1/2 tab daily per Dr. eLidy Morrow   Yes Historical Provider, MD chlorthalidone (HYGROTON) 25 MG tablet Take 1 tablet by mouth daily 21   Carmela Lidya, APRN - CNP     Allergies  has No Known Allergies. Family History  family history includes Arthritis in her maternal grandmother; Asthma in her maternal uncle and mother; Hypertension in her maternal aunt, maternal grandmother, maternal uncle, and mother; Stroke in her maternal grandmother. Social History   reports that she has never smoked. She has never used smokeless tobacco.   reports no history of alcohol use. reports no history of drug use. Marital Status single  Occupation works at [employer] Map Decisions:   VITALS:  height is 5' 4\" (1.626 m) and weight is 235 lb (106.6 kg). Her temporal temperature is 96.4 °F (35.8 °C). Her blood pressure is 139/87 and her pulse is 89. Her respiration is 20 and oxygen saturation is 98%. CONSTITUTIONAL:alert & oriented x 3, no acute distress. Very pleasant. SKIN:  Warm and dry, no rashes on exposed areas of skin. HEAD:  Normocephalic, atraumatic   EYES: PERRL. EOMs intact. EARS:  Hearing grossly WNL. NOSE:  Nares patent. No rhinorrhea   MOUTH/THROAT:  benign  NECK:supple, no lymphadenopathy  LUNGS: Clear to auscultation bilaterally, no wheezes. CARDIOVASCULAR: Heart sounds are normal.  Regular rate and rhythm without murmur. ABDOMEN: soft, non tender, non distended. Rotund. EXTREMITIES: no edema bilateral lower extremities. Testing:   EK21  Labs pending: drawn 3/29/2021   Chest XRay:  21    IMPRESSIONS:   1. obesity  2.  has a past medical history of GERD (gastroesophageal reflux disease), Hyperlipidemia, Hypertension, Major depressive disorder, recurrent episode, mild ([address] (10/21/2015), Obesity, Class III, BMI 40-49.9 (morbid obesity) ([address] TRISHA (obstructive sleep apnea), Pre-diabetes, Presence of tooth-root and mandibular implants, Tachycardia, Wears contact lenses, and Wellness examination. PLANS:   1.  Sleeve gastrectomy    LAMONTE DA SILVA RAINA FERRARA  Electronically signed 3/29/2021 at 3:26 PM

## 2021-03-29 NOTE — H&P
History and Physical    Pt Name: Jose Swann  MRN: [id number]  YOB: 1980  Date of evaluation: 3/29/2021    SUBJECTIVE:   History of Chief Complaint:    Patient presents for PAT appointment, complains of obesity, difficulty with ability to lose weight and maintain that weight loss. She has tried several programs in the past without lasting success. She has several comorbid conditions that would be helped with weight loss as well. She has been following with bariatrics and has been scheduled for sleeve gastrectomy. Past Medical History    has a past medical history of GERD (gastroesophageal reflux disease), Hyperlipidemia, Hypertension, Major depressive disorder, recurrent episode, mild ([address] Obesity, Class III, BMI 40-49.9 (morbid obesity) ([facility] [address] TRISHA (obstructive sleep apnea), Pre-diabetes, Presence of tooth-root and mandibular implants, Tachycardia, Wears contact lenses, and Wellness examination. Past Surgical History   has a past surgical history that includes  section; Upper gastrointestinal endoscopy (N/A, 2021); and Tonsillectomy. Medications  Prior to Admission medications    Medication Sig Start Date End Date Taking?  Authorizing Provider   Multiple Vitamins-Minerals (HM MULTIVITAMIN ADULT GUMMY) CHEW Take by mouth   Yes Historical Provider, MD   potassium chloride (KLOR-CON M) 20 MEQ extended release tablet TAKE 2 TABLETS BY MOUTH DAILY 3/18/21  Yes LUZ Yuen CNP   montelukast (SINGULAIR) 10 MG tablet TAKE 1 TABLET BY MOUTH DAILY 3/18/21  Yes LUZ Sullivan CNP   Cholecalciferol (VITAMIN D-3 PO) Take by mouth Daily   Yes Historical Provider, MD   amLODIPine (NORVASC) 5 MG tablet Take 1 tablet by mouth daily 10/19/20  Yes LUZ Dixon CNP   losartan (COZAAR) 100 MG tablet Take 1 tablet by mouth daily 10/9/20  Yes LUZ Sullivan CNP   Metoprolol Succinate 25 MG CS24 Take by mouth 1/2 tab daily per Dr. Shona Stanton   Yes Historical Provider, MD RAINA FERRARA  Electronically signed 3/29/2021 at 3:26 PM

## 2021-03-30 ENCOUNTER — TELEPHONE (OUTPATIENT)
Dept: FAMILY MEDICINE CLINIC | Age: 41
End: 2021-03-30

## 2021-03-30 LAB
EKG ATRIAL RATE: 84 BPM
EKG P AXIS: 34 DEGREES
EKG P-R INTERVAL: 228 MS
EKG Q-T INTERVAL: 396 MS
EKG QRS DURATION: 86 MS
EKG QTC CALCULATION (BAZETT): 467 MS
EKG R AXIS: 41 DEGREES
EKG T AXIS: 36 DEGREES
EKG VENTRICULAR RATE: 84 BPM

## 2021-03-30 PROCEDURE — 93010 ELECTROCARDIOGRAM REPORT: CPT | Performed by: INTERNAL MEDICINE

## 2021-03-30 RX ORDER — HEPARIN SODIUM 5000 [USP'U]/ML
5000 INJECTION, SOLUTION INTRAVENOUS; SUBCUTANEOUS ONCE
Status: CANCELLED | OUTPATIENT
Start: 2021-03-30 | End: 2021-03-30

## 2021-03-30 NOTE — TELEPHONE ENCOUNTER
[facility] called to get surgical clearance for this patient. Can this be based off of 12/4/20 visit or do you need to see her sooner?   Surgery is 4/5/21

## 2021-04-01 ENCOUNTER — OFFICE VISIT (OUTPATIENT)
Dept: FAMILY MEDICINE CLINIC | Age: 41
End: 2021-04-01
Payer: COMMERCIAL

## 2021-04-01 ENCOUNTER — HOSPITAL ENCOUNTER (OUTPATIENT)
Dept: LAB | Age: 41
Setting detail: SPECIMEN
Discharge: HOME | End: 2021-04-01
Payer: COMMERCIAL

## 2021-04-01 VITALS
WEIGHT: 233 LBS | TEMPERATURE: 97.1 F | BODY MASS INDEX: 39.99 KG/M2 | DIASTOLIC BLOOD PRESSURE: 80 MMHG | SYSTOLIC BLOOD PRESSURE: 118 MMHG | HEART RATE: 72 BPM | OXYGEN SATURATION: 97 %

## 2021-04-01 DIAGNOSIS — Z01.818 PRE-OPERATIVE CLEARANCE: ICD-10-CM

## 2021-04-01 DIAGNOSIS — I10 ESSENTIAL HYPERTENSION: ICD-10-CM

## 2021-04-01 DIAGNOSIS — Z20.822 COVID-19 RULED OUT BY LABORATORY TESTING: Primary | ICD-10-CM

## 2021-04-01 DIAGNOSIS — E87.6 HYPOKALEMIA: ICD-10-CM

## 2021-04-01 DIAGNOSIS — I49.3 PVC (PREMATURE VENTRICULAR CONTRACTION): ICD-10-CM

## 2021-04-01 DIAGNOSIS — E66.01 OBESITY, CLASS III, BMI 40-49.9 (MORBID OBESITY) (HCC): Primary | ICD-10-CM

## 2021-04-01 PROCEDURE — 1036F TOBACCO NON-USER: CPT | Performed by: NURSE PRACTITIONER

## 2021-04-01 PROCEDURE — U0003 INFECTIOUS AGENT DETECTION BY NUCLEIC ACID (DNA OR RNA); SEVERE ACUTE RESPIRATORY SYNDROME CORONAVIRUS 2 (SARS-COV-2) (CORONAVIRUS DISEASE [COVID-19]), AMPLIFIED PROBE TECHNIQUE, MAKING USE OF HIGH THROUGHPUT TECHNOLOGIES AS DESCRIBED BY CMS-2020-01-R: HCPCS

## 2021-04-01 PROCEDURE — U0005 INFEC AGEN DETEC AMPLI PROBE: HCPCS

## 2021-04-01 PROCEDURE — G8427 DOCREV CUR MEDS BY ELIG CLIN: HCPCS | Performed by: NURSE PRACTITIONER

## 2021-04-01 PROCEDURE — G8417 CALC BMI ABV UP PARAM F/U: HCPCS | Performed by: NURSE PRACTITIONER

## 2021-04-01 PROCEDURE — 99214 OFFICE O/P EST MOD 30 MIN: CPT | Performed by: NURSE PRACTITIONER

## 2021-04-01 RX ORDER — POTASSIUM CHLORIDE 20 MEQ/1
40 TABLET, EXTENDED RELEASE ORAL DAILY
Qty: 60 TABLET | Refills: 5
Start: 2021-04-01 | End: 2021-04-01

## 2021-04-01 RX ORDER — POTASSIUM CHLORIDE 20 MEQ/1
40 TABLET, EXTENDED RELEASE ORAL 2 TIMES DAILY
Qty: 60 TABLET | Refills: 5
Start: 2021-04-01 | End: 2021-04-02 | Stop reason: SDUPTHER

## 2021-04-01 ASSESSMENT — ENCOUNTER SYMPTOMS
CHEST TIGHTNESS: 0
COUGH: 0
SHORTNESS OF BREATH: 0
GASTROINTESTINAL NEGATIVE: 1
RESPIRATORY NEGATIVE: 1
VOMITING: 0
DIARRHEA: 0
EYES NEGATIVE: 1
COLOR CHANGE: 0
ALLERGIC/IMMUNOLOGIC NEGATIVE: 1
NAUSEA: 0
WHEEZING: 0

## 2021-04-01 ASSESSMENT — PATIENT HEALTH QUESTIONNAIRE - PHQ9
1. LITTLE INTEREST OR PLEASURE IN DOING THINGS: 0
2. FEELING DOWN, DEPRESSED OR HOPELESS: 0
SUM OF ALL RESPONSES TO PHQ QUESTIONS 1-9: 0

## 2021-04-01 NOTE — PROGRESS NOTES
[address]  Dept: [phone]    Jose Swann is a 41 y.o. female who presents for a preoperative physical examination. She is scheduled to have gastric sleeve done by Dr. Rocco Vora at [facility]. V's on 2021    Any recent illnesses that will interfere with the upcoming surgery? None  Last surgical procedure -  EGD  Type of anesthesia used - sedation   Problems with the procedure or anesthesia? None known     Conditioning (equivalent to 4 METs) --  1. Can you walk up 2 flights of stairs? Yes  2. Can you run a 'short distance'? Yes  3. Can you walk up a hill 1-2 blocks? Yes  4. Can you nina 2 bags of groceries up 1 flight of stairs? Yes  5. Can you walk 2-4 blocks (flat)? Yes     Past Medical History:   Diagnosis Date    GERD (gastroesophageal reflux disease)     Hyperlipidemia     not on rx    Hypertension     managed per pcp    Major depressive disorder, recurrent episode, mild ([facility] 10/21/2015    [facility]    Obesity, Class III, BMI 40-49.9 (morbid obesity) ([facility] [facility]     TRISHA (obstructive sleep apnea)     Saw Dr. Sherry De Jesus for clearance for surgery    Pre-diabetes     diet control    Presence of tooth-root and mandibular implants     all top teeth    Tachycardia     per pt.  Saw Dr. Jose Luis Celeste 2020    Wears contact lenses     Instructed not to wear contacts for surgery      Past Surgical History:   Procedure Laterality Date     SECTION      x1    TONSILLECTOMY      UPPER GASTROINTESTINAL ENDOSCOPY N/A 2021    EGD BIOPSY performed by Albertina Haq DO at [facility]       Family History   Problem Relation Age of Onset    Hypertension Mother     Asthma Mother     Hypertension Maternal Aunt     Asthma Maternal Uncle     Hypertension Maternal Uncle     Arthritis Maternal Grandmother     Hypertension Maternal Grandmother     Stroke Maternal Grandmother     Breast Cancer Neg Hx     Cancer Neg Hx     Colon Cancer Neg pain, palpitations and leg swelling. Gastrointestinal: Negative. Negative for diarrhea, nausea and vomiting. Endocrine: Negative. Genitourinary: Negative. Negative for difficulty urinating and dysuria. Musculoskeletal: Negative. Negative for myalgias. Skin: Negative. Negative for color change, pallor, rash and wound. Allergic/Immunologic: Negative. Neurological: Negative. Negative for dizziness, seizures, syncope, light-headedness and headaches. Hematological: Negative. Psychiatric/Behavioral: Negative.        :     Vitals:    04/01/21 1037   BP: 118/80   Pulse: 72   Temp: 97.1 °F (36.2 °C)   SpO2: 97%       Body mass index is 39.99 kg/m². Physical Exam  Constitutional:       General: She is not in acute distress. Appearance: Normal appearance. She is well-developed. She is obese. She is not ill-appearing, toxic-appearing or diaphoretic. HENT:      Head: Normocephalic and atraumatic. Right Ear: External ear normal.      Left Ear: External ear normal.      Nose: Nose normal.   Eyes:      General: No scleral icterus. Right eye: No discharge. Left eye: No discharge. Conjunctiva/sclera: Conjunctivae normal.      Pupils: Pupils are equal, round, and reactive to light. Neck:      Musculoskeletal: Normal range of motion and neck supple. Trachea: No tracheal deviation. Cardiovascular:      Rate and Rhythm: Normal rate and regular rhythm. Heart sounds: Normal heart sounds. No murmur. No friction rub. No gallop. Pulmonary:      Effort: Pulmonary effort is normal. No tachypnea, accessory muscle usage or respiratory distress. Breath sounds: Normal breath sounds. No stridor. No decreased breath sounds, wheezing, rhonchi or rales. Abdominal:      Palpations: Abdomen is soft. Musculoskeletal: Normal range of motion. General: No tenderness or deformity. Skin:     General: Skin is warm and dry. Coloration: Skin is not pale. Findings: No erythema or rash. Neurological:      Mental Status: She is alert and oriented to person, place, and time. GCS: GCS eye subscore is 4. GCS verbal subscore is 5. GCS motor subscore is 6. Gait: Gait is intact. Psychiatric:         Speech: Speech normal.         Behavior: Behavior normal.         Thought Content: Thought content normal.         Judgment: Judgment normal.           :         1. Obesity, Class III, BMI 40-49.9 (morbid obesity) ([address]    2. Hypokalemia    3. PVC (premature ventricular contraction)    4. Essential hypertension    5. Pre-operative clearance        :     1. Obesity, Class III, BMI 40-49.9 (morbid obesity) ([address]      2. Hypokalemia    - Potassium; Future  - potassium chloride (KLOR-CON M) 20 MEQ extended release tablet; Take 2 tablets by mouth 2 times daily  Dispense: 60 tablet; Refill: 5    Increase K to 40 mg BID  Repeat K tomorrow STAT  Re-eval tomorrow in office     3. PVC (premature ventricular contraction)    Will re-check EKG s/p hypokalemia correction  Clearance pending above     4. Essential hypertension    Plan to d/c diuretic s/p surgery due to hypokalemia     5. Pre-operative clearance    Revised Cardiac Risk Index (RCRI)-(one point for each)    . Diabetes requiring insulin  2. Risky surgery (intrathoracic, intraperitoneal,vascular)  3.Cad (including + test, EKG, etc)  4. Chf  5. Cva hx  6. Cr> 2.0    Score - for cardiac mortality  0 = 0.4%  1 = 1.0%  2 = 2.4-6.6%  or more = 5.4-11%    RCRI score is 0  0 -> go to OR  1-2 -surgery with beta blocker if itwill change mgmt &/or consider non invasive testing  >:   vascular surgery- consider further testing & beta blocker  Intermediaterisk surgery - to OR with possible beta blocker or consider noninvasive testing       Labs have been reviewed  Patient is not yet cleared for surgery , plan as noted above     Electronically signed by LUZ Mustafa CNP on 4/1/2021

## 2021-04-02 ENCOUNTER — HOSPITAL ENCOUNTER (OUTPATIENT)
Age: 41
Setting detail: SPECIMEN
Discharge: HOME | End: 2021-04-02
Payer: COMMERCIAL

## 2021-04-02 ENCOUNTER — OFFICE VISIT (OUTPATIENT)
Dept: FAMILY MEDICINE CLINIC | Age: 41
End: 2021-04-02
Payer: COMMERCIAL

## 2021-04-02 VITALS
WEIGHT: 233 LBS | BODY MASS INDEX: 39.99 KG/M2 | OXYGEN SATURATION: 97 % | TEMPERATURE: 98.2 F | SYSTOLIC BLOOD PRESSURE: 142 MMHG | HEART RATE: 84 BPM | DIASTOLIC BLOOD PRESSURE: 90 MMHG

## 2021-04-02 DIAGNOSIS — E87.6 HYPOKALEMIA: ICD-10-CM

## 2021-04-02 DIAGNOSIS — E66.01 OBESITY, CLASS III, BMI 40-49.9 (MORBID OBESITY) (HCC): Primary | ICD-10-CM

## 2021-04-02 DIAGNOSIS — Z01.818 PRE-OPERATIVE CLEARANCE: ICD-10-CM

## 2021-04-02 LAB
POTASSIUM SERPL-SCNC: 3.9 MMOL/L (ref 3.7–5.3)
SARS-COV-2: NORMAL
SARS-COV-2: NOT DETECTED
SOURCE: NORMAL

## 2021-04-02 PROCEDURE — 1036F TOBACCO NON-USER: CPT | Performed by: NURSE PRACTITIONER

## 2021-04-02 PROCEDURE — G8427 DOCREV CUR MEDS BY ELIG CLIN: HCPCS | Performed by: NURSE PRACTITIONER

## 2021-04-02 PROCEDURE — G8417 CALC BMI ABV UP PARAM F/U: HCPCS | Performed by: NURSE PRACTITIONER

## 2021-04-02 PROCEDURE — 93000 ELECTROCARDIOGRAM COMPLETE: CPT | Performed by: NURSE PRACTITIONER

## 2021-04-02 PROCEDURE — 99214 OFFICE O/P EST MOD 30 MIN: CPT | Performed by: NURSE PRACTITIONER

## 2021-04-02 RX ORDER — POTASSIUM CHLORIDE 20 MEQ/1
40 TABLET, EXTENDED RELEASE ORAL 2 TIMES DAILY
Qty: 120 TABLET | Refills: 0 | Status: SHIPPED | OUTPATIENT
Start: 2021-04-02 | End: 2021-04-29

## 2021-04-02 ASSESSMENT — ENCOUNTER SYMPTOMS
WHEEZING: 0
NAUSEA: 0
DIARRHEA: 0
VOMITING: 0
TROUBLE SWALLOWING: 0
CHEST TIGHTNESS: 0
SORE THROAT: 0
ALLERGIC/IMMUNOLOGIC NEGATIVE: 1
COUGH: 0
GASTROINTESTINAL NEGATIVE: 1
EYES NEGATIVE: 1
COLOR CHANGE: 0
SHORTNESS OF BREATH: 0
RESPIRATORY NEGATIVE: 1

## 2021-04-02 ASSESSMENT — PATIENT HEALTH QUESTIONNAIRE - PHQ9
SUM OF ALL RESPONSES TO PHQ QUESTIONS 1-9: 0
1. LITTLE INTEREST OR PLEASURE IN DOING THINGS: 0
2. FEELING DOWN, DEPRESSED OR HOPELESS: 0
SUM OF ALL RESPONSES TO PHQ QUESTIONS 1-9: 0

## 2021-04-02 NOTE — PROGRESS NOTES
[facility]  [address] [facility]  Dept: [phone]      Leslie Avelar is a 41 y.o. female who presents today for her medical conditions/complaintsas noted below. Leslie Avelar is here today c/o Pre-op Exam    Past Medical History:   Diagnosis Date    GERD (gastroesophageal reflux disease)     Hyperlipidemia     not on rx    Hypertension     managed per pcp    Major depressive disorder, recurrent episode, mild ([address] 10/21/2015    [facility]    Obesity, Class III, BMI 40-49.9 (morbid obesity) ([facility] [address]     TRISHA (obstructive sleep apnea)     Saw Dr. Dyana Camargo for clearance for surgery    Pre-diabetes     diet control    Presence of tooth-root and mandibular implants     all top teeth    Tachycardia     per pt.  Saw Dr. Waqas Ortega 2020    Wears contact lenses     Instructed not to wear contacts for surgery      Past Surgical History:   Procedure Laterality Date     SECTION      x1    TONSILLECTOMY      UPPER GASTROINTESTINAL ENDOSCOPY N/A 2021    EGD BIOPSY performed by Jennifer Jeong DO at [facility]       Family History   Problem Relation Age of Onset    Hypertension Mother     Asthma Mother     Hypertension Maternal Aunt     Asthma Maternal Uncle     Hypertension Maternal Uncle     Arthritis Maternal Grandmother     Hypertension Maternal Grandmother     Stroke Maternal Grandmother     Breast Cancer Neg Hx     Cancer Neg Hx     Colon Cancer Neg Hx     Diabetes Neg Hx     Eclampsia Neg Hx     Ovarian Cancer Neg Hx      Labor Neg Hx     Spont Abortions Neg Hx        Social History     Tobacco Use    Smoking status: Never Smoker    Smokeless tobacco: Never Used   Substance Use Topics    Alcohol use: No     Alcohol/week: 0.0 standard drinks      Current Outpatient Medications   Medication Sig Dispense Refill    potassium chloride (KLOR-CON M) 20 MEQ extended release tablet Take 2 tablets by mouth 2 times daily 60 tablet 5    Multiple Vitamins-Minerals ( MULTIVITAMIN ADULT GUMMY) CHEW Take by mouth      montelukast (SINGULAIR) 10 MG tablet TAKE 1 TABLET BY MOUTH DAILY 30 tablet 5    Cholecalciferol (VITAMIN D-3 PO) Take by mouth Daily      chlorthalidone (HYGROTON) 25 MG tablet Take 1 tablet by mouth daily 30 tablet 5    amLODIPine (NORVASC) 5 MG tablet Take 1 tablet by mouth daily 30 tablet 11    losartan (COZAAR) 100 MG tablet Take 1 tablet by mouth daily 30 tablet 11    Metoprolol Succinate 25 MG CS24 Take by mouth 1/2 tab daily per Dr. Horowitz Records       No current facility-administered medications for this visit. No Known Allergies      HPI:     HPI    Presents today c/o follow-up pre op exam from yesterday    Potassium was increased to 40 meq BID x 24 hours  Repeat potassium re-checked and normal today  Repeat EKG re-checked and normalized without PVC's     PMH significant for GERD, HTN, obesity, IFG, HLD     No known history of cardiac or pulmonary disease   Currently patient declines fever/chills, cough, dyspnea, chest pain, palpitations, myalgias,  dizziness or syncope     Health Maintenance:      Subjective:     Review of Systems   Constitutional: Negative. Negative for appetite change, chills, diaphoresis, fatigue, fever and unexpected weight change. HENT: Negative. Negative for congestion, ear pain, sore throat and trouble swallowing. Eyes: Negative. Respiratory: Negative. Negative for cough, chest tightness, shortness of breath and wheezing. Cardiovascular: Negative. Negative for chest pain, palpitations and leg swelling. Gastrointestinal: Negative. Negative for diarrhea, nausea and vomiting. Endocrine: Negative. Genitourinary: Negative. Negative for difficulty urinating and dysuria. Musculoskeletal: Negative. Skin: Negative. Negative for color change, pallor, rash and wound. Allergic/Immunologic: Negative. Neurological: Negative.   Negative for dizziness, light-headedness and Judgment: Judgment normal.           Assessment:         1. Obesity, Class III, BMI 40-49.9 (morbid obesity) ([facility] [address]    2. Pre-operative clearance    3. Hypokalemia        Plan:     1. Obesity, Class III, BMI 40-49.9 (morbid obesity) ([facility] [address]      2. Pre-operative clearance    - EKG 12 lead    Repeat EKG & potassium reviewed / normal   OK to proceed with surgery     3. Hypokalemia    - potassium chloride (KLOR-CON M) 20 MEQ extended release tablet; Take 2 tablets by mouth 2 times daily  Dispense: 120 tablet; Refill: 0    RESOLVED   Continue current K supplement  Follow-up 3 weeks after surgery for adjustment of BP medication(s) , plan to d/c diuretic / K if tolerated     Discussed use, benefit, and side effects of prescribed medications. All patient questions answered. Pt voiced understanding. Reviewed health maintenance. Instructed to continue current medications, diet and exercise. Patient agreedwith treatment plan. Follow up as directed.      Electronically signed by LUZ Carreon CNP on 4/2/2021

## 2021-04-03 LAB
3-OH-COTININE: <2 NG/ML
COTININE: <2 NG/ML
NICOTINE: <2 NG/ML

## 2021-04-05 ENCOUNTER — HOSPITAL ENCOUNTER (INPATIENT)
Age: 41
LOS: 1 days | Discharge: HOME | DRG: 403 | End: 2021-04-06
Attending: SURGERY | Admitting: SURGERY
Payer: COMMERCIAL

## 2021-04-05 ENCOUNTER — ANESTHESIA (OUTPATIENT)
Dept: OPERATING ROOM | Age: 41
DRG: 403 | End: 2021-04-05
Payer: COMMERCIAL

## 2021-04-05 ENCOUNTER — ANESTHESIA EVENT (OUTPATIENT)
Dept: OPERATING ROOM | Age: 41
DRG: 403 | End: 2021-04-05
Payer: COMMERCIAL

## 2021-04-05 VITALS — TEMPERATURE: 79.4 F | SYSTOLIC BLOOD PRESSURE: 163 MMHG | OXYGEN SATURATION: 100 % | DIASTOLIC BLOOD PRESSURE: 98 MMHG

## 2021-04-05 DIAGNOSIS — G89.18 ACUTE POST-OPERATIVE PAIN: ICD-10-CM

## 2021-04-05 DIAGNOSIS — Z98.84 S/P LAPAROSCOPIC SLEEVE GASTRECTOMY: Primary | ICD-10-CM

## 2021-04-05 LAB
ANION GAP SERPL CALCULATED.3IONS-SCNC: 10 MMOL/L (ref 9–17)
BUN BLDV-MCNC: 19 MG/DL (ref 6–20)
BUN/CREAT BLD: ABNORMAL (ref 9–20)
CALCIUM SERPL-MCNC: 9.2 MG/DL (ref 8.6–10.4)
CHLORIDE BLD-SCNC: 97 MMOL/L (ref 98–107)
CO2: 24 MMOL/L (ref 20–31)
CREAT SERPL-MCNC: 1.09 MG/DL (ref 0.5–0.9)
GFR AFRICAN AMERICAN: >60 ML/MIN
GFR NON-AFRICAN AMERICAN: 55 ML/MIN
GFR SERPL CREATININE-BSD FRML MDRD: ABNORMAL ML/MIN/{1.73_M2}
GFR SERPL CREATININE-BSD FRML MDRD: ABNORMAL ML/MIN/{1.73_M2}
GLUCOSE BLD-MCNC: 157 MG/DL (ref 70–99)
HCG, PREGNANCY URINE (POC): NEGATIVE
HCT VFR BLD CALC: 33.8 % (ref 36.3–47.1)
HEMOGLOBIN: 10.6 G/DL (ref 11.9–15.1)
MCH RBC QN AUTO: 26.7 PG (ref 25.2–33.5)
MCHC RBC AUTO-ENTMCNC: 31.4 G/DL (ref 28.4–34.8)
MCV RBC AUTO: 85.1 FL (ref 82.6–102.9)
NRBC AUTOMATED: 0 PER 100 WBC
PDW BLD-RTO: 14.1 % (ref 11.8–14.4)
PLATELET # BLD: 400 K/UL (ref 138–453)
PMV BLD AUTO: 8.9 FL (ref 8.1–13.5)
POC POTASSIUM: 3.5 MMOL/L (ref 3.5–4.5)
POTASSIUM SERPL-SCNC: 3.8 MMOL/L (ref 3.7–5.3)
RBC # BLD: 3.97 M/UL (ref 3.95–5.11)
SODIUM BLD-SCNC: 131 MMOL/L (ref 135–144)
WBC # BLD: 18.1 K/UL (ref 3.5–11.3)

## 2021-04-05 PROCEDURE — 81025 URINE PREGNANCY TEST: CPT

## 2021-04-05 PROCEDURE — 2580000003 HC RX 258: Performed by: SURGERY

## 2021-04-05 PROCEDURE — 7100000000 HC PACU RECOVERY - FIRST 15 MIN: Performed by: SURGERY

## 2021-04-05 PROCEDURE — 1200000000 HC SEMI PRIVATE

## 2021-04-05 PROCEDURE — 6370000000 HC RX 637 (ALT 250 FOR IP): Performed by: STUDENT IN AN ORGANIZED HEALTH CARE EDUCATION/TRAINING PROGRAM

## 2021-04-05 PROCEDURE — 88307 TISSUE EXAM BY PATHOLOGIST: CPT

## 2021-04-05 PROCEDURE — 2709999900 HC NON-CHARGEABLE SUPPLY: Performed by: SURGERY

## 2021-04-05 PROCEDURE — 80048 BASIC METABOLIC PNL TOTAL CA: CPT

## 2021-04-05 PROCEDURE — 2580000003 HC RX 258: Performed by: STUDENT IN AN ORGANIZED HEALTH CARE EDUCATION/TRAINING PROGRAM

## 2021-04-05 PROCEDURE — 2580000003 HC RX 258: Performed by: ANESTHESIOLOGY

## 2021-04-05 PROCEDURE — 2720000010 HC SURG SUPPLY STERILE: Performed by: SURGERY

## 2021-04-05 PROCEDURE — 2500000003 HC RX 250 WO HCPCS: Performed by: NURSE ANESTHETIST, CERTIFIED REGISTERED

## 2021-04-05 PROCEDURE — S2900 ROBOTIC SURGICAL SYSTEM: HCPCS | Performed by: SURGERY

## 2021-04-05 PROCEDURE — 6360000002 HC RX W HCPCS: Performed by: NURSE ANESTHETIST, CERTIFIED REGISTERED

## 2021-04-05 PROCEDURE — 0BQT4ZZ REPAIR DIAPHRAGM, PERCUTANEOUS ENDOSCOPIC APPROACH: ICD-10-PCS | Performed by: SURGERY

## 2021-04-05 PROCEDURE — 6360000002 HC RX W HCPCS: Performed by: SURGERY

## 2021-04-05 PROCEDURE — 3700000000 HC ANESTHESIA ATTENDED CARE: Performed by: SURGERY

## 2021-04-05 PROCEDURE — 7100000001 HC PACU RECOVERY - ADDTL 15 MIN: Performed by: SURGERY

## 2021-04-05 PROCEDURE — 43775 LAP SLEEVE GASTRECTOMY: CPT | Performed by: SURGERY

## 2021-04-05 PROCEDURE — 85027 COMPLETE CBC AUTOMATED: CPT

## 2021-04-05 PROCEDURE — 6360000002 HC RX W HCPCS: Performed by: ANESTHESIOLOGY

## 2021-04-05 PROCEDURE — 0DJ08ZZ INSPECTION OF UPPER INTESTINAL TRACT, VIA NATURAL OR ARTIFICIAL OPENING ENDOSCOPIC: ICD-10-PCS | Performed by: SURGERY

## 2021-04-05 PROCEDURE — 84132 ASSAY OF SERUM POTASSIUM: CPT

## 2021-04-05 PROCEDURE — 2500000003 HC RX 250 WO HCPCS: Performed by: STUDENT IN AN ORGANIZED HEALTH CARE EDUCATION/TRAINING PROGRAM

## 2021-04-05 PROCEDURE — 3600000009 HC SURGERY ROBOT BASE: Performed by: SURGERY

## 2021-04-05 PROCEDURE — 6360000002 HC RX W HCPCS: Performed by: STUDENT IN AN ORGANIZED HEALTH CARE EDUCATION/TRAINING PROGRAM

## 2021-04-05 PROCEDURE — 96374 THER/PROPH/DIAG INJ IV PUSH: CPT

## 2021-04-05 PROCEDURE — 8E0W4CZ ROBOTIC ASSISTED PROCEDURE OF TRUNK REGION, PERCUTANEOUS ENDOSCOPIC APPROACH: ICD-10-PCS | Performed by: SURGERY

## 2021-04-05 PROCEDURE — 2500000003 HC RX 250 WO HCPCS: Performed by: ANESTHESIOLOGY

## 2021-04-05 PROCEDURE — 0DB64Z3 EXCISION OF STOMACH, PERCUTANEOUS ENDOSCOPIC APPROACH, VERTICAL: ICD-10-PCS | Performed by: SURGERY

## 2021-04-05 PROCEDURE — 3600000019 HC SURGERY ROBOT ADDTL 15MIN: Performed by: SURGERY

## 2021-04-05 PROCEDURE — 6360000002 HC RX W HCPCS

## 2021-04-05 PROCEDURE — 3700000001 HC ADD 15 MINUTES (ANESTHESIA): Performed by: SURGERY

## 2021-04-05 RX ORDER — SODIUM CHLORIDE 0.9 % (FLUSH) 0.9 %
10 SYRINGE (ML) INJECTION PRN
Status: DISCONTINUED | OUTPATIENT
Start: 2021-04-05 | End: 2021-04-06 | Stop reason: HOSPADM

## 2021-04-05 RX ORDER — FENTANYL CITRATE 50 UG/ML
100 INJECTION, SOLUTION INTRAMUSCULAR; INTRAVENOUS ONCE
Status: COMPLETED | OUTPATIENT
Start: 2021-04-05 | End: 2021-04-05

## 2021-04-05 RX ORDER — OXYCODONE HYDROCHLORIDE AND ACETAMINOPHEN 5; 325 MG/1; MG/1
1 TABLET ORAL EVERY 6 HOURS PRN
Qty: 28 TABLET | Refills: 0 | Status: SHIPPED | OUTPATIENT
Start: 2021-04-05 | End: 2021-04-12

## 2021-04-05 RX ORDER — HEPARIN SODIUM 5000 [USP'U]/ML
5000 INJECTION, SOLUTION INTRAVENOUS; SUBCUTANEOUS ONCE
Status: COMPLETED | OUTPATIENT
Start: 2021-04-05 | End: 2021-04-05

## 2021-04-05 RX ORDER — MAGNESIUM HYDROXIDE 1200 MG/15ML
LIQUID ORAL CONTINUOUS PRN
Status: COMPLETED | OUTPATIENT
Start: 2021-04-05 | End: 2021-04-05

## 2021-04-05 RX ORDER — FENTANYL CITRATE 50 UG/ML
INJECTION, SOLUTION INTRAMUSCULAR; INTRAVENOUS CONTINUOUS PRN
Status: DISCONTINUED | OUTPATIENT
Start: 2021-04-05 | End: 2021-04-05

## 2021-04-05 RX ORDER — SODIUM CHLORIDE, SODIUM LACTATE, POTASSIUM CHLORIDE, CALCIUM CHLORIDE 600; 310; 30; 20 MG/100ML; MG/100ML; MG/100ML; MG/100ML
1000 INJECTION, SOLUTION INTRAVENOUS CONTINUOUS
Status: DISCONTINUED | OUTPATIENT
Start: 2021-04-05 | End: 2021-04-05

## 2021-04-05 RX ORDER — MIDAZOLAM HYDROCHLORIDE 2 MG/2ML
2 INJECTION, SOLUTION INTRAMUSCULAR; INTRAVENOUS ONCE
Status: COMPLETED | OUTPATIENT
Start: 2021-04-05 | End: 2021-04-05

## 2021-04-05 RX ORDER — LIDOCAINE HYDROCHLORIDE 10 MG/ML
INJECTION, SOLUTION EPIDURAL; INFILTRATION; INTRACAUDAL; PERINEURAL PRN
Status: DISCONTINUED | OUTPATIENT
Start: 2021-04-05 | End: 2021-04-05 | Stop reason: SDUPTHER

## 2021-04-05 RX ORDER — SCOLOPAMINE TRANSDERMAL SYSTEM 1 MG/1
1 PATCH, EXTENDED RELEASE TRANSDERMAL
Status: DISCONTINUED | OUTPATIENT
Start: 2021-04-05 | End: 2021-04-06 | Stop reason: HOSPADM

## 2021-04-05 RX ORDER — ONDANSETRON 2 MG/ML
4 INJECTION INTRAMUSCULAR; INTRAVENOUS EVERY 6 HOURS PRN
Status: DISCONTINUED | OUTPATIENT
Start: 2021-04-05 | End: 2021-04-06 | Stop reason: HOSPADM

## 2021-04-05 RX ORDER — DEXAMETHASONE SODIUM PHOSPHATE 4 MG/ML
INJECTION, SOLUTION INTRA-ARTICULAR; INTRALESIONAL; INTRAMUSCULAR; INTRAVENOUS; SOFT TISSUE PRN
Status: DISCONTINUED | OUTPATIENT
Start: 2021-04-05 | End: 2021-04-05 | Stop reason: SDUPTHER

## 2021-04-05 RX ORDER — PROPOFOL 10 MG/ML
INJECTION, EMULSION INTRAVENOUS PRN
Status: DISCONTINUED | OUTPATIENT
Start: 2021-04-05 | End: 2021-04-05 | Stop reason: SDUPTHER

## 2021-04-05 RX ORDER — PHENYLEPHRINE HYDROCHLORIDE 10 MG/ML
INJECTION INTRAVENOUS PRN
Status: DISCONTINUED | OUTPATIENT
Start: 2021-04-05 | End: 2021-04-05 | Stop reason: SDUPTHER

## 2021-04-05 RX ORDER — OXYCODONE HYDROCHLORIDE AND ACETAMINOPHEN 5; 325 MG/1; MG/1
1 TABLET ORAL EVERY 4 HOURS PRN
Status: DISCONTINUED | OUTPATIENT
Start: 2021-04-05 | End: 2021-04-06 | Stop reason: HOSPADM

## 2021-04-05 RX ORDER — BUPIVACAINE HYDROCHLORIDE 5 MG/ML
30 INJECTION, SOLUTION EPIDURAL; INTRACAUDAL ONCE
Status: COMPLETED | OUTPATIENT
Start: 2021-04-05 | End: 2021-04-05

## 2021-04-05 RX ORDER — ONDANSETRON 2 MG/ML
INJECTION INTRAMUSCULAR; INTRAVENOUS PRN
Status: DISCONTINUED | OUTPATIENT
Start: 2021-04-05 | End: 2021-04-05 | Stop reason: SDUPTHER

## 2021-04-05 RX ORDER — FENTANYL CITRATE 50 UG/ML
INJECTION, SOLUTION INTRAMUSCULAR; INTRAVENOUS PRN
Status: DISCONTINUED | OUTPATIENT
Start: 2021-04-05 | End: 2021-04-05 | Stop reason: SDUPTHER

## 2021-04-05 RX ORDER — SODIUM CHLORIDE, SODIUM LACTATE, POTASSIUM CHLORIDE, CALCIUM CHLORIDE 600; 310; 30; 20 MG/100ML; MG/100ML; MG/100ML; MG/100ML
INJECTION, SOLUTION INTRAVENOUS CONTINUOUS
Status: DISCONTINUED | OUTPATIENT
Start: 2021-04-05 | End: 2021-04-06 | Stop reason: HOSPADM

## 2021-04-05 RX ORDER — GLYCOPYRROLATE 1 MG/5 ML
SYRINGE (ML) INTRAVENOUS PRN
Status: DISCONTINUED | OUTPATIENT
Start: 2021-04-05 | End: 2021-04-05 | Stop reason: SDUPTHER

## 2021-04-05 RX ORDER — METOPROLOL TARTRATE 5 MG/5ML
2.5 INJECTION INTRAVENOUS EVERY 8 HOURS
Status: DISCONTINUED | OUTPATIENT
Start: 2021-04-05 | End: 2021-04-06

## 2021-04-05 RX ORDER — SODIUM CHLORIDE 0.9 % (FLUSH) 0.9 %
10 SYRINGE (ML) INJECTION EVERY 12 HOURS SCHEDULED
Status: DISCONTINUED | OUTPATIENT
Start: 2021-04-05 | End: 2021-04-06 | Stop reason: HOSPADM

## 2021-04-05 RX ORDER — OXYCODONE HYDROCHLORIDE AND ACETAMINOPHEN 5; 325 MG/1; MG/1
2 TABLET ORAL EVERY 4 HOURS PRN
Status: DISCONTINUED | OUTPATIENT
Start: 2021-04-05 | End: 2021-04-06 | Stop reason: HOSPADM

## 2021-04-05 RX ORDER — PROMETHAZINE HYDROCHLORIDE 25 MG/ML
6.25 INJECTION, SOLUTION INTRAMUSCULAR; INTRAVENOUS EVERY 6 HOURS PRN
Status: DISCONTINUED | OUTPATIENT
Start: 2021-04-05 | End: 2021-04-06 | Stop reason: HOSPADM

## 2021-04-05 RX ORDER — ROCURONIUM BROMIDE 10 MG/ML
INJECTION, SOLUTION INTRAVENOUS PRN
Status: DISCONTINUED | OUTPATIENT
Start: 2021-04-05 | End: 2021-04-05 | Stop reason: SDUPTHER

## 2021-04-05 RX ORDER — SODIUM CHLORIDE 9 MG/ML
25 INJECTION, SOLUTION INTRAVENOUS PRN
Status: DISCONTINUED | OUTPATIENT
Start: 2021-04-05 | End: 2021-04-06 | Stop reason: HOSPADM

## 2021-04-05 RX ORDER — NEOSTIGMINE METHYLSULFATE 5 MG/5 ML
SYRINGE (ML) INTRAVENOUS PRN
Status: DISCONTINUED | OUTPATIENT
Start: 2021-04-05 | End: 2021-04-05 | Stop reason: SDUPTHER

## 2021-04-05 RX ADMIN — SODIUM CHLORIDE, POTASSIUM CHLORIDE, SODIUM LACTATE AND CALCIUM CHLORIDE: 600; 310; 30; 20 INJECTION, SOLUTION INTRAVENOUS at 18:39

## 2021-04-05 RX ADMIN — MIDAZOLAM HYDROCHLORIDE 2 MG: 1 INJECTION, SOLUTION INTRAMUSCULAR; INTRAVENOUS at 09:29

## 2021-04-05 RX ADMIN — METRONIDAZOLE 500 MG: 500 INJECTION, SOLUTION INTRAVENOUS at 20:28

## 2021-04-05 RX ADMIN — HYDROMORPHONE HYDROCHLORIDE 0.5 MG: 1 INJECTION, SOLUTION INTRAMUSCULAR; INTRAVENOUS; SUBCUTANEOUS at 13:50

## 2021-04-05 RX ADMIN — HYDROMORPHONE HYDROCHLORIDE 0.25 MG: 1 INJECTION, SOLUTION INTRAMUSCULAR; INTRAVENOUS; SUBCUTANEOUS at 14:46

## 2021-04-05 RX ADMIN — LIDOCAINE HYDROCHLORIDE 50 MG: 10 INJECTION, SOLUTION EPIDURAL; INFILTRATION; INTRACAUDAL; PERINEURAL at 10:07

## 2021-04-05 RX ADMIN — ROCURONIUM BROMIDE 50 MG: 10 INJECTION INTRAVENOUS at 10:07

## 2021-04-05 RX ADMIN — HEPARIN SODIUM 5000 UNITS: 5000 INJECTION INTRAVENOUS; SUBCUTANEOUS at 08:52

## 2021-04-05 RX ADMIN — DEXTROSE MONOHYDRATE 2000 MG: 50 INJECTION, SOLUTION INTRAVENOUS at 18:39

## 2021-04-05 RX ADMIN — METOPROLOL TARTRATE 2.5 MG: 1 INJECTION, SOLUTION INTRAVENOUS at 18:39

## 2021-04-05 RX ADMIN — PHENYLEPHRINE HYDROCHLORIDE 200 MCG: 10 INJECTION INTRAVENOUS at 10:51

## 2021-04-05 RX ADMIN — SODIUM CHLORIDE, POTASSIUM CHLORIDE, SODIUM LACTATE AND CALCIUM CHLORIDE 1000 ML: 600; 310; 30; 20 INJECTION, SOLUTION INTRAVENOUS at 08:35

## 2021-04-05 RX ADMIN — OXYCODONE HYDROCHLORIDE AND ACETAMINOPHEN 2 TABLET: 5; 325 TABLET ORAL at 23:19

## 2021-04-05 RX ADMIN — HYDROMORPHONE HYDROCHLORIDE 0.5 MG: 1 INJECTION, SOLUTION INTRAMUSCULAR; INTRAVENOUS; SUBCUTANEOUS at 13:49

## 2021-04-05 RX ADMIN — HYDROMORPHONE HYDROCHLORIDE 0.5 MG: 1 INJECTION, SOLUTION INTRAMUSCULAR; INTRAVENOUS; SUBCUTANEOUS at 13:15

## 2021-04-05 RX ADMIN — ROCURONIUM BROMIDE 20 MG: 10 INJECTION INTRAVENOUS at 10:45

## 2021-04-05 RX ADMIN — PHENYLEPHRINE HYDROCHLORIDE 100 MCG: 10 INJECTION INTRAVENOUS at 11:43

## 2021-04-05 RX ADMIN — CEFAZOLIN 2 G: 10 INJECTION, POWDER, FOR SOLUTION INTRAVENOUS at 10:15

## 2021-04-05 RX ADMIN — PHENYLEPHRINE HYDROCHLORIDE 200 MCG: 10 INJECTION INTRAVENOUS at 10:25

## 2021-04-05 RX ADMIN — Medication 4 MG: at 12:25

## 2021-04-05 RX ADMIN — FENTANYL CITRATE 100 MCG: 50 INJECTION, SOLUTION INTRAMUSCULAR; INTRAVENOUS at 09:29

## 2021-04-05 RX ADMIN — PROPOFOL 200 MG: 10 INJECTION, EMULSION INTRAVENOUS at 10:07

## 2021-04-05 RX ADMIN — HYDROMORPHONE HYDROCHLORIDE 0.5 MG: 1 INJECTION, SOLUTION INTRAMUSCULAR; INTRAVENOUS; SUBCUTANEOUS at 13:20

## 2021-04-05 RX ADMIN — OXYCODONE HYDROCHLORIDE AND ACETAMINOPHEN 1 TABLET: 5; 325 TABLET ORAL at 18:24

## 2021-04-05 RX ADMIN — SODIUM CHLORIDE, POTASSIUM CHLORIDE, SODIUM LACTATE AND CALCIUM CHLORIDE: 600; 310; 30; 20 INJECTION, SOLUTION INTRAVENOUS at 11:15

## 2021-04-05 RX ADMIN — FAMOTIDINE 20 MG: 10 INJECTION INTRAVENOUS at 20:28

## 2021-04-05 RX ADMIN — PHENYLEPHRINE HYDROCHLORIDE 100 MCG: 10 INJECTION INTRAVENOUS at 11:36

## 2021-04-05 RX ADMIN — FENTANYL CITRATE 100 MCG: 50 INJECTION, SOLUTION INTRAMUSCULAR; INTRAVENOUS at 10:07

## 2021-04-05 RX ADMIN — PHENYLEPHRINE HYDROCHLORIDE 100 MCG: 10 INJECTION INTRAVENOUS at 10:53

## 2021-04-05 RX ADMIN — PHENYLEPHRINE HYDROCHLORIDE 100 MCG: 10 INJECTION INTRAVENOUS at 11:46

## 2021-04-05 RX ADMIN — FENTANYL CITRATE 100 MCG: 50 INJECTION, SOLUTION INTRAMUSCULAR; INTRAVENOUS at 12:44

## 2021-04-05 RX ADMIN — PHENYLEPHRINE HYDROCHLORIDE 200 MCG: 10 INJECTION INTRAVENOUS at 11:17

## 2021-04-05 RX ADMIN — PHENYLEPHRINE HYDROCHLORIDE 100 MCG: 10 INJECTION INTRAVENOUS at 11:39

## 2021-04-05 RX ADMIN — Medication 0.25 MG: at 14:46

## 2021-04-05 RX ADMIN — ONDANSETRON 4 MG: 2 INJECTION INTRAMUSCULAR; INTRAVENOUS at 23:14

## 2021-04-05 RX ADMIN — SODIUM CHLORIDE, POTASSIUM CHLORIDE, SODIUM LACTATE AND CALCIUM CHLORIDE: 600; 310; 30; 20 INJECTION, SOLUTION INTRAVENOUS at 10:03

## 2021-04-05 RX ADMIN — Medication 0.4 MG: at 12:25

## 2021-04-05 RX ADMIN — ONDANSETRON 4 MG: 2 INJECTION INTRAMUSCULAR; INTRAVENOUS at 12:15

## 2021-04-05 RX ADMIN — DEXAMETHASONE SODIUM PHOSPHATE 4 MG: 4 INJECTION, SOLUTION INTRAMUSCULAR; INTRAVENOUS at 10:07

## 2021-04-05 RX ADMIN — Medication 40 ML: at 09:32

## 2021-04-05 ASSESSMENT — PULMONARY FUNCTION TESTS
PIF_VALUE: 28
PIF_VALUE: 27
PIF_VALUE: 30
PIF_VALUE: 22
PIF_VALUE: 28
PIF_VALUE: 32
PIF_VALUE: 31
PIF_VALUE: 27
PIF_VALUE: 29
PIF_VALUE: 29
PIF_VALUE: 31
PIF_VALUE: 32
PIF_VALUE: 32
PIF_VALUE: 1
PIF_VALUE: 27
PIF_VALUE: 29
PIF_VALUE: 22
PIF_VALUE: 29
PIF_VALUE: 29
PIF_VALUE: 27
PIF_VALUE: 29
PIF_VALUE: 28
PIF_VALUE: 29
PIF_VALUE: 1
PIF_VALUE: 31
PIF_VALUE: 28
PIF_VALUE: 32
PIF_VALUE: 31
PIF_VALUE: 29
PIF_VALUE: 32
PIF_VALUE: 22
PIF_VALUE: 29
PIF_VALUE: 18
PIF_VALUE: 30
PIF_VALUE: 29
PIF_VALUE: 29
PIF_VALUE: 23
PIF_VALUE: 27
PIF_VALUE: 27
PIF_VALUE: 28
PIF_VALUE: 27
PIF_VALUE: 26
PIF_VALUE: 1
PIF_VALUE: 27
PIF_VALUE: 27
PIF_VALUE: 30
PIF_VALUE: 9
PIF_VALUE: 1
PIF_VALUE: 27
PIF_VALUE: 29
PIF_VALUE: 18
PIF_VALUE: 19
PIF_VALUE: 32
PIF_VALUE: 27
PIF_VALUE: 1
PIF_VALUE: 28
PIF_VALUE: 30
PIF_VALUE: 1
PIF_VALUE: 23
PIF_VALUE: 30
PIF_VALUE: 23
PIF_VALUE: 28
PIF_VALUE: 27
PIF_VALUE: 31
PIF_VALUE: 29
PIF_VALUE: 19
PIF_VALUE: 29
PIF_VALUE: 1
PIF_VALUE: 1
PIF_VALUE: 30
PIF_VALUE: 31
PIF_VALUE: 23
PIF_VALUE: 29
PIF_VALUE: 22
PIF_VALUE: 28
PIF_VALUE: 30
PIF_VALUE: 27
PIF_VALUE: 18
PIF_VALUE: 18
PIF_VALUE: 21
PIF_VALUE: 28
PIF_VALUE: 30
PIF_VALUE: 27
PIF_VALUE: 31
PIF_VALUE: 31
PIF_VALUE: 29
PIF_VALUE: 27
PIF_VALUE: 22
PIF_VALUE: 29
PIF_VALUE: 18
PIF_VALUE: 32
PIF_VALUE: 32

## 2021-04-05 ASSESSMENT — PAIN SCALES - GENERAL
PAINLEVEL_OUTOF10: 5
PAINLEVEL_OUTOF10: 10
PAINLEVEL_OUTOF10: 8
PAINLEVEL_OUTOF10: 8
PAINLEVEL_OUTOF10: 0
PAINLEVEL_OUTOF10: 8

## 2021-04-05 ASSESSMENT — PAIN DESCRIPTION - LOCATION
LOCATION: ABDOMEN

## 2021-04-05 ASSESSMENT — PAIN DESCRIPTION - DESCRIPTORS: DESCRIPTORS: ACHING

## 2021-04-05 ASSESSMENT — PAIN DESCRIPTION - PAIN TYPE: TYPE: SURGICAL PAIN

## 2021-04-05 NOTE — OP NOTE
Operative Note      Patient: Saqib Staples  YOB: 1980  MRN: [id number]    Date of Procedure: 4/5/2021    Pre-Op Diagnosis: MORBID OBESITY, BMI 40, HTN, HYPERLIPIDEMIA, HIATAL HERNIA    Post-Op Diagnosis: Same       Procedure(s):  XI LAPAROSCOPIC ROBOTIC GASTRECTOMY SLEEVE, HIATAL HERNIA REPAIR, EGD - GI UNIT SCHEDULED. Surgeon(s):  Mamadou Pérez DO    Assistant:   First Assistant: Nasir Pryor  Resident: Rikki Koo DO    Anesthesia: General    Estimated Blood Loss (mL): less than 50     Complications: None    Specimens:   ID Type Source Tests Collected by Time Destination   A : Gastric Remnant Tissue Stomach SURGICAL PATHOLOGY Mamadou Pérez DO 4/5/2021 1217        Implants:  * No implants in log *      Drains:   Closed/Suction Drain Right RUQ;Abdomen Bulb 19 Indonesian (Active)   Site Description Unable to view 04/05/21 1248   Dressing Status Clean;Dry; Intact 04/05/21 1248   Drainage Appearance Bloody 04/05/21 1248   Status To bulb suction 04/05/21 1248   Output (ml) 50 ml 04/05/21 1248       Findings: Negative leak test.  Hemostatic staple lines. Large 3-4 cm hiatal hernia    Detailed Description of Procedure:   Operative narrative: The patient was taken to the operative suite and administered anesthesia by the anesthetic team.  Next we prepped and draped in normal sterile fashion. Incision was then made at Chirinos's point for a 12 mm port. The abdomen was surveyed and we entered the abdomen using a optical Visiport trocar of 12 mm in size. This was accomplished at Chirinos's point. Pneumoperitoneum was then established without complication, the underlying area surveyed. We then placed 4 other ports in standard fashion under direct laparoscopic visualization. Attention was then turned towards placement of the [facility] liver retractor. Small incision made just inferior to the xiphoid process for the liver retractor.   The liver retractor was then placed under direct laparoscopic visualization in order to facilitate visualization of the stomach and hiatus. Large visible hiatal hernia. At this time due to a hiatal hernia decision was made to perform hiatal hernia repair. We mobilized the pars flaccida until the right tabby was noted and we could begin our dissection. Using accommodation of blunt and sharp dissection as well as electrocautery a window was created along the right tabby along the esophagus. The avascular plane was entered and this facilitated our dissection. Care dissection circumferentially around the esophagus to expose the left tabby. The right and left vagus nerves were identified. Upon completion of full dissection around the esophagus we then used O Ethibond suture to reapproximate the anterior and posterior tabby. A 52 Serbian bougie was then passed by anesthesia under direct visualization until we could see this within the lumen of the stomach and this was across the crura in satisfactory position. Using the Ethibond we then closed the the hiatal hernia defect. A blunt probe could easily be passed between the crural defect closure and the esophagus while the bougie was in place. Satisfactory repair noted. The liver slightly enlarged and had a small crack in the serosa under the left lobe along the liver retractor, inherent to retraction. I applied Ethicon Snow to the region to ensure hemostasis after cauterizing the region. We then turned our attention towards formation of the gastric sleeve. Starting at 6 cm proximal to the pylorus bite dissection was undertaken of the greater curvature and the short gastric vessels taken down using the Vessel Sealer. We mobilized this from our 6 cm starting point to the left tabby. Satisfactory mobilization was undertaken and there were noted to be no adhesions posteriorly on the stomach between the stomach and the pancreas or retroperitoneum.   I then had anesthesia pass a 36 Serbian bougie under direct visualization. This was visualized at the tip of the bougie as well as along the lesser curve. I then placed my first staple load a green 60 mm load to start sleeve formation. The bougie was noted be along the lesser curve and was freely mobile before the first green load was fired. A second green load 60 mm stapler was then placed again using the bougie for assistance and guidance along the lesser curvature. I then used blue 60 mm stapler loads to complete sleeve formation. The last staple load was noted to fire in proper orientation to prevent staple line formation along the esophageal fibers. Satisfactory sleeve formation was noted at that time and the staple line was hemostatic. I then had the bougie removed. I then reinforced my staple line with 3-0 Vicryl sutures along the length of the staple line at the staple line confluences. At that time leak test was then started. The patient was placed in a Trendelenburg position and we irrigated the upper abdomen with saline. I then passed an Olympus endoscope into the oropharynx down the esophagus under direct visualization. The scope was passed down through the esophagus down into the lumen of the new gastric sleeve the scope passed easily through the incisura and into the antrum. The scope was then passed into the duodenum through the pylorus. The pylorus and duodenum appeared intact and the scope was slowly withdrawn while visualizing the staple line throughout the course of the staple line. I then clamped distally by applying pressure near the pyloric region to allow for distention of the gastric sleeve. We then further irrigated and there was no evidence of leak using a bubble test.  The staple line on the inner lumen of the stomach appeared intact and hemostatic throughout. The scope was slowly withdrawn to the GE junction and no evidence of stricture noted. The scope was then withdrawn from the esophagus and no other lesion noted. Attention was then turned back towards the abdomen the abdomen was aspirated of the prior placed saline for leak test.  I then placed an anchoring suture using 3-0 Vicryl suture to prevent torsion of the sleeve and migration of the sleeve superiorly. I then placed a 23 [place] Blanquita Percy drain into the abdomen and positioned this posterior to the stomach and along the staple line. Specimen was withdrawn from the left upper quadrant incision. We then irrigated this incision thoroughly with saline. The drain was noted to be in proper position and again the staple line noted be hemostatic. Next counts reported to me as correct. The 12 mm port sites were then closed using a suture passer to pass 0 Vicryl suture under direct laparoscopic visualization for proper fascial reapproximation at each port site. All ports were then removed and the drain secured into position using a 2-0 silk suture. Pneumoperitoneum was released in entirety. The skin was then closed using 4-0 Vicryl subcuticular stitches in interrupted fashion. The region was cleaned with sterile normal saline followed by placement of TinCoBen and Steri-Strips and sterile bandage. The drain was connected to bulb suction. The patient tolerated the procedure well and was transferred to PACU. The patient's family was updated postoperatively.        Electronically signed by Josey Mayorga DO on 4/5/2021 at 2:21 PM

## 2021-04-05 NOTE — BRIEF OP NOTE
Brief Postoperative Note      Patient: Devon Oseguera  YOB: 1980  MRN: [id number]    Date of Procedure: 4/5/2021    Pre-Op Diagnosis: MORBID OBESITY, HTN, HYPERLIPIDEMIA    Post-Op Diagnosis: Same       Procedure(s):  XI LAPAROSCOPIC ROBOTIC GASTRECTOMY SLEEVE, HIATAL HERNIA REPAIR, EGD - GI UNIT SCHEDULED.     Surgeon(s):  Wayne Hanna DO    Assistant:  First Assistant: Nilo Pop  Resident: Sita Landry DO    Anesthesia: General    Estimated Blood Loss (mL): Minimal    Complications: None    Specimens:   ID Type Source Tests Collected by Time Destination   A : Gastric Remnant Tissue Stomach SURGICAL PATHOLOGY Wayne Hanna DO 4/5/2021 1217        Implants:  * No implants in log *      Drains:   Closed/Suction Drain Right RUQ;Abdomen Bulb 19 Occitan (Active)       Findings: see note    Electronically signed by Wayne Hanna DO on 4/5/2021 at 12:26 PM

## 2021-04-05 NOTE — ANESTHESIA PRE PROCEDURE
Department of Anesthesiology  Preprocedure Note       Name:  Jose Swann   Age:  41 y.o.  :  1980                                          MRN:  [id number]         Date:  2021      Surgeon: Lb Haywood):  Albertina Daily, DO    Procedure: Procedure(s):  XI LAPAROSCOPIC ROBOTIC GASTRECTOMY SLEEVE, LIVER BIOPSY, EGD - GI UNIT SCHEDULED. Medications prior to admission:   Prior to Admission medications    Medication Sig Start Date End Date Taking?  Authorizing Provider   potassium chloride (KLOR-CON M) 20 MEQ extended release tablet Take 2 tablets by mouth 2 times daily 21  Yes LUZ Sullivan CNP   Multiple Vitamins-Minerals (HM MULTIVITAMIN ADULT GUMMY) CHEW Take by mouth   Yes Historical Provider, MD   montelukast (SINGULAIR) 10 MG tablet TAKE 1 TABLET BY MOUTH DAILY 3/18/21  Yes LUZ Sullivan CNP   Cholecalciferol (VITAMIN D-3 PO) Take by mouth Daily   Yes Historical Provider, MD   chlorthalidone (HYGROTON) 25 MG tablet Take 1 tablet by mouth daily 21  Yes LUZ Dixon CNP   amLODIPine (NORVASC) 5 MG tablet Take 1 tablet by mouth daily 10/19/20  Yes LUZ Dixon CNP   losartan (COZAAR) 100 MG tablet Take 1 tablet by mouth daily 10/9/20  Yes LUZ Sullivan CNP   Metoprolol Succinate 25 MG CS24 Take by mouth 1/2 tab daily per Dr. Shona Stanton   Yes Historical Provider, MD       Current medications:    Current Facility-Administered Medications   Medication Dose Route Frequency Provider Last Rate Last Admin    ceFAZolin (ANCEF) 2000 mg in dextrose 5 % 50 mL IVPB  2,000 mg Intravenous Once Albertina Daily, DO        lactated ringers infusion 1,000 mL  1,000 mL Intravenous Continuous Adina Valencia MD 50 mL/hr at 21 0835 1,000 mL at 21 0835       Allergies:  No Known Allergies    Problem List:    Patient Active Problem List   Diagnosis Code    Hyperlipidemia E78.5    Essential hypertension I10    Hypokalemia E87.6    Vitamin D deficiency food consumption: 04/04/21    BMI:   Wt Readings from Last 3 Encounters:   04/05/21 229 lb (103.9 kg)   03/29/21 235 lb (106.6 kg)   04/02/21 233 lb (105.7 kg)     Body mass index is 39.31 kg/m².     CBC:   Lab Results   Component Value Date    WBC 11.7 03/29/2021    RBC 4.04 03/29/2021    HGB 10.5 03/29/2021    HCT 33.8 03/29/2021    MCV 83.7 03/29/2021    RDW 14.1 03/29/2021     03/29/2021       CMP:   Lab Results   Component Value Date     03/29/2021    K 3.9 04/02/2021    CL 95 03/29/2021    CO2 29 03/29/2021    BUN 19 03/29/2021    CREATININE 0.73 03/29/2021    GFRAA >60 03/29/2021    LABGLOM >60 03/29/2021    GLUCOSE 86 03/29/2021    PROT 7.9 07/03/2020    CALCIUM 9.6 03/29/2021    BILITOT 0.35 07/03/2020    ALKPHOS 50 07/03/2020    AST 15 07/03/2020    ALT 11 07/03/2020       POC Tests:   Recent Labs     04/05/21  3042   POCK 3.5       Coags:   Lab Results   Component Value Date    PROTIME 10.3 03/29/2021    INR 1.0 03/29/2021    APTT 24.5 03/29/2021       HCG (If Applicable):   Lab Results   Component Value Date    PREGTESTUR Positive 12/23/2014    HCG NEGATIVE 04/05/2021        ABGs: No results found for: PHART, PO2ART, WIK7NJL, AXE6CRW, BEART, P1CDCCKA     Type & Screen (If Applicable):  No results found for: LABABO, LABRH    Drug/Infectious Status (If Applicable):  No results found for: HIV, HEPCAB    COVID-19 Screening (If Applicable):   Lab Results   Component Value Date    COVID19 Not Detected 04/01/2021    COVID19 Not Detected 08/02/2020           Anesthesia Evaluation  Patient summary reviewed and Nursing notes reviewed no history of anesthetic complications:   Airway: Mallampati: II  TM distance: >3 FB   Neck ROM: full  Mouth opening: > = 3 FB Dental: normal exam         Pulmonary:normal exam    (+) sleep apnea: on CPAP,                             Cardiovascular:  Exercise tolerance: good (>4 METS),   (+) hypertension:, hyperlipidemia    (-) past MI, CAD, CABG/stent, dysrhythmias, angina and  CHF    ECG reviewed  Rhythm: regular  Rate: normal           Beta Blocker:  Not on Beta Blocker         Neuro/Psych:   (+) neuromuscular disease:, psychiatric history:            GI/Hepatic/Renal:   (+) GERD:, morbid obesity          Endo/Other: Negative Endo/Other ROS                    Abdominal:           Vascular:                                        Anesthesia Plan      general and regional     ASA 3       Induction: intravenous. MIPS: Postoperative opioids intended and Prophylactic antiemetics administered. Anesthetic plan and risks discussed with patient.       Plan discussed with CRNA and surgical team.                  Judi Kaufman MD   4/5/2021

## 2021-04-06 ENCOUNTER — APPOINTMENT (OUTPATIENT)
Dept: GENERAL RADIOLOGY | Age: 41
DRG: 403 | End: 2021-04-06
Attending: SURGERY
Payer: COMMERCIAL

## 2021-04-06 VITALS
SYSTOLIC BLOOD PRESSURE: 150 MMHG | HEART RATE: 86 BPM | BODY MASS INDEX: 39.09 KG/M2 | WEIGHT: 229 LBS | OXYGEN SATURATION: 96 % | DIASTOLIC BLOOD PRESSURE: 87 MMHG | RESPIRATION RATE: 18 BRPM | TEMPERATURE: 99.1 F | HEIGHT: 64 IN

## 2021-04-06 PROBLEM — Z98.84 STATUS POST LAPAROSCOPIC SLEEVE GASTRECTOMY: Status: ACTIVE | Noted: 2021-04-06

## 2021-04-06 LAB
ANION GAP SERPL CALCULATED.3IONS-SCNC: 13 MMOL/L (ref 9–17)
BUN BLDV-MCNC: 11 MG/DL (ref 6–20)
BUN/CREAT BLD: ABNORMAL (ref 9–20)
CALCIUM SERPL-MCNC: 8.9 MG/DL (ref 8.6–10.4)
CHLORIDE BLD-SCNC: 94 MMOL/L (ref 98–107)
CO2: 25 MMOL/L (ref 20–31)
CREAT SERPL-MCNC: 0.91 MG/DL (ref 0.5–0.9)
GFR AFRICAN AMERICAN: >60 ML/MIN
GFR NON-AFRICAN AMERICAN: >60 ML/MIN
GFR SERPL CREATININE-BSD FRML MDRD: ABNORMAL ML/MIN/{1.73_M2}
GFR SERPL CREATININE-BSD FRML MDRD: ABNORMAL ML/MIN/{1.73_M2}
GLUCOSE BLD-MCNC: 88 MG/DL (ref 70–99)
HCT VFR BLD CALC: 32.1 % (ref 36.3–47.1)
HEMOGLOBIN: 9.9 G/DL (ref 11.9–15.1)
MCH RBC QN AUTO: 26.1 PG (ref 25.2–33.5)
MCHC RBC AUTO-ENTMCNC: 30.8 G/DL (ref 28.4–34.8)
MCV RBC AUTO: 84.7 FL (ref 82.6–102.9)
NRBC AUTOMATED: 0 PER 100 WBC
PDW BLD-RTO: 14.1 % (ref 11.8–14.4)
PLATELET # BLD: 330 K/UL (ref 138–453)
PMV BLD AUTO: 9.3 FL (ref 8.1–13.5)
POTASSIUM SERPL-SCNC: 3.7 MMOL/L (ref 3.7–5.3)
RBC # BLD: 3.79 M/UL (ref 3.95–5.11)
SODIUM BLD-SCNC: 132 MMOL/L (ref 135–144)
SURGICAL PATHOLOGY REPORT: NORMAL
WBC # BLD: 10.9 K/UL (ref 3.5–11.3)

## 2021-04-06 PROCEDURE — 74240 X-RAY XM UPR GI TRC 1CNTRST: CPT

## 2021-04-06 PROCEDURE — 6360000004 HC RX CONTRAST MEDICATION: Performed by: SURGERY

## 2021-04-06 PROCEDURE — 2500000003 HC RX 250 WO HCPCS: Performed by: STUDENT IN AN ORGANIZED HEALTH CARE EDUCATION/TRAINING PROGRAM

## 2021-04-06 PROCEDURE — 85027 COMPLETE CBC AUTOMATED: CPT

## 2021-04-06 PROCEDURE — 36415 COLL VENOUS BLD VENIPUNCTURE: CPT

## 2021-04-06 PROCEDURE — 2580000003 HC RX 258: Performed by: STUDENT IN AN ORGANIZED HEALTH CARE EDUCATION/TRAINING PROGRAM

## 2021-04-06 PROCEDURE — 80048 BASIC METABOLIC PNL TOTAL CA: CPT

## 2021-04-06 PROCEDURE — 96375 TX/PRO/DX INJ NEW DRUG ADDON: CPT

## 2021-04-06 PROCEDURE — 6370000000 HC RX 637 (ALT 250 FOR IP): Performed by: SURGERY

## 2021-04-06 PROCEDURE — G0378 HOSPITAL OBSERVATION PER HR: HCPCS

## 2021-04-06 PROCEDURE — 6370000000 HC RX 637 (ALT 250 FOR IP): Performed by: STUDENT IN AN ORGANIZED HEALTH CARE EDUCATION/TRAINING PROGRAM

## 2021-04-06 PROCEDURE — 96372 THER/PROPH/DIAG INJ SC/IM: CPT

## 2021-04-06 PROCEDURE — 6360000002 HC RX W HCPCS: Performed by: STUDENT IN AN ORGANIZED HEALTH CARE EDUCATION/TRAINING PROGRAM

## 2021-04-06 PROCEDURE — 96376 TX/PRO/DX INJ SAME DRUG ADON: CPT

## 2021-04-06 RX ORDER — PROMETHAZINE HYDROCHLORIDE 25 MG/1
25 TABLET ORAL EVERY 6 HOURS PRN
Qty: 30 TABLET | Refills: 0 | Status: SHIPPED | OUTPATIENT
Start: 2021-04-06 | End: 2021-04-14

## 2021-04-06 RX ORDER — METOPROLOL SUCCINATE 25 MG/1
12.5 TABLET, EXTENDED RELEASE ORAL DAILY
Status: DISCONTINUED | OUTPATIENT
Start: 2021-04-06 | End: 2021-04-06 | Stop reason: HOSPADM

## 2021-04-06 RX ORDER — PROMETHAZINE HYDROCHLORIDE 25 MG/1
25 TABLET ORAL EVERY 6 HOURS PRN
Status: DISCONTINUED | OUTPATIENT
Start: 2021-04-06 | End: 2021-04-06 | Stop reason: HOSPADM

## 2021-04-06 RX ORDER — HEPARIN SODIUM 5000 [USP'U]/ML
5000 INJECTION, SOLUTION INTRAVENOUS; SUBCUTANEOUS EVERY 8 HOURS SCHEDULED
Status: DISCONTINUED | OUTPATIENT
Start: 2021-04-06 | End: 2021-04-06 | Stop reason: HOSPADM

## 2021-04-06 RX ORDER — FAMOTIDINE 20 MG/1
20 TABLET, FILM COATED ORAL 2 TIMES DAILY
Qty: 60 TABLET | Refills: 3 | Status: SHIPPED | OUTPATIENT
Start: 2021-04-06 | End: 2022-01-07

## 2021-04-06 RX ADMIN — SODIUM CHLORIDE, POTASSIUM CHLORIDE, SODIUM LACTATE AND CALCIUM CHLORIDE: 600; 310; 30; 20 INJECTION, SOLUTION INTRAVENOUS at 16:24

## 2021-04-06 RX ADMIN — DEXTROSE MONOHYDRATE 2000 MG: 50 INJECTION, SOLUTION INTRAVENOUS at 03:06

## 2021-04-06 RX ADMIN — SODIUM CHLORIDE, PRESERVATIVE FREE 10 ML: 5 INJECTION INTRAVENOUS at 09:44

## 2021-04-06 RX ADMIN — FAMOTIDINE 20 MG: 10 INJECTION INTRAVENOUS at 09:44

## 2021-04-06 RX ADMIN — PROMETHAZINE HYDROCHLORIDE 25 MG: 25 TABLET ORAL at 14:19

## 2021-04-06 RX ADMIN — SODIUM CHLORIDE, POTASSIUM CHLORIDE, SODIUM LACTATE AND CALCIUM CHLORIDE: 600; 310; 30; 20 INJECTION, SOLUTION INTRAVENOUS at 08:03

## 2021-04-06 RX ADMIN — METOPROLOL TARTRATE 2.5 MG: 1 INJECTION, SOLUTION INTRAVENOUS at 01:39

## 2021-04-06 RX ADMIN — OXYCODONE HYDROCHLORIDE AND ACETAMINOPHEN 1 TABLET: 5; 325 TABLET ORAL at 08:15

## 2021-04-06 RX ADMIN — IOHEXOL 30 ML: 240 INJECTION, SOLUTION INTRATHECAL; INTRAVASCULAR; INTRAVENOUS; ORAL at 08:58

## 2021-04-06 RX ADMIN — PROMETHAZINE HYDROCHLORIDE 6.25 MG: 25 INJECTION INTRAMUSCULAR; INTRAVENOUS at 04:21

## 2021-04-06 RX ADMIN — HEPARIN SODIUM 5000 UNITS: 5000 INJECTION INTRAVENOUS; SUBCUTANEOUS at 14:11

## 2021-04-06 RX ADMIN — METOPROLOL SUCCINATE 12.5 MG: 25 TABLET, FILM COATED, EXTENDED RELEASE ORAL at 09:44

## 2021-04-06 RX ADMIN — HEPARIN SODIUM 5000 UNITS: 5000 INJECTION INTRAVENOUS; SUBCUTANEOUS at 09:44

## 2021-04-06 RX ADMIN — DEXTROSE MONOHYDRATE 2000 MG: 50 INJECTION, SOLUTION INTRAVENOUS at 10:01

## 2021-04-06 RX ADMIN — METRONIDAZOLE 500 MG: 500 INJECTION, SOLUTION INTRAVENOUS at 04:20

## 2021-04-06 ASSESSMENT — PAIN DESCRIPTION - DESCRIPTORS: DESCRIPTORS: ACHING;DISCOMFORT

## 2021-04-06 ASSESSMENT — PAIN SCALES - GENERAL
PAINLEVEL_OUTOF10: 5
PAINLEVEL_OUTOF10: 7

## 2021-04-06 ASSESSMENT — PAIN DESCRIPTION - PAIN TYPE: TYPE: SURGICAL PAIN

## 2021-04-06 ASSESSMENT — PAIN DESCRIPTION - LOCATION: LOCATION: ABDOMEN

## 2021-04-06 ASSESSMENT — PAIN DESCRIPTION - FREQUENCY: FREQUENCY: CONTINUOUS

## 2021-04-06 NOTE — PROGRESS NOTES
Writer educated patient on use of incentive spirometer. Patient was able to demonstrate effective use.

## 2021-04-06 NOTE — PROGRESS NOTES
CLINICAL PHARMACY NOTE: MEDS TO [address] Select Patient?: Yes  Total # of Prescriptions Filled: 2   The following medications were delivered to the patient:  · Promethazine  · Percocet  · famotadine  Total # of Interventions Completed: 0  Time Spent (min): 0    Additional Documentation:

## 2021-04-06 NOTE — CARE COORDINATION
Transitional Planning:    Spoke with the pt, she stated that she feels safe to go home and that she will have a ride. She declined having any needs such as DME/HH.      Discharge [id number] [facility]  Clinical Case Management Department  Written by: Terrie Yeboah RN    Patient Name: Tri Brunson  Attending Provider: Josey Mayorga DO  Admit Date: 2021  7:43 AM  MRN: [id number]  Account: [de-identified]                     : 1980  Discharge Date: 21      Disposition: home    Terrie Yeboah RN

## 2021-04-06 NOTE — PROGRESS NOTES
Writer went over discharge instructions with patient. Patient verbalized understanding. Writer reviewed with patient how to manage ANTONIA drain and monitor signs and symptoms of infection and patient was able to verbalize understanding. Patient denied any additional questions or concerns.

## 2021-04-06 NOTE — PLAN OF CARE
Problem: Falls - Risk of:  Goal: Will remain free from falls  Description: Will remain free from falls  Outcome: Ongoing  Goal: Absence of physical injury  Description: Absence of physical injury  Outcome: Ongoing     Problem: Discharge Planning:  Goal: Discharged to appropriate level of care  Description: Discharged to appropriate level of care  Outcome: Ongoing     Problem:  Bowel Function - Altered:  Goal: Bowel elimination is within specified parameters  Description: Bowel elimination is within specified parameters  Outcome: Ongoing     Problem: Fluid Volume - Imbalance:  Goal: Ability to achieve a balanced intake and output will improve  Description: Ability to achieve a balanced intake and output will improve  Outcome: Ongoing     Problem: Infection - Surgical Site:  Goal: Will show no infection signs and symptoms  Description: Will show no infection signs and symptoms  Outcome: Ongoing  Goal: Ability to maintain a body temperature in the normal range will improve  Description: Ability to maintain a body temperature in the normal range will improve  Outcome: Ongoing     Problem: Nutrition Deficit:  Goal: Ability to identify appropriate dietary choices will improve  Description: Ability to identify appropriate dietary choices will improve  Outcome: Ongoing     Problem: Pain:  Goal: Pain level will decrease  Description: Pain level will decrease  Outcome: Ongoing  Goal: Control of acute pain  Description: Control of acute pain  Outcome: Ongoing  Goal: Control of chronic pain  Description: Control of chronic pain  Outcome: Ongoing     Problem: Venous Thromboembolism:  Goal: Will show no signs or symptoms of venous thromboembolism  Description: Will show no signs or symptoms of venous thromboembolism  Outcome: Ongoing  Goal: Absence of signs or symptoms of impaired coagulation  Description: Absence of signs or symptoms of impaired coagulation  Outcome: Ongoing     Problem: Pain:  Goal: Pain level will decrease  Description: Pain level will decrease  Outcome: Ongoing  Goal: Control of acute pain  Description: Control of acute pain  Outcome: Ongoing  Goal: Control of chronic pain  Description: Control of chronic pain  Outcome: Ongoing

## 2021-04-06 NOTE — PROGRESS NOTES
General Surgery:  Daily Progress Note            PATIENT NAME: Devon Oseguera     TODAY'S DATE: 4/6/2021, 7:01 AM  CC:  Abdominal pain    SUBJECTIVE:     Pt seen and examined at bedside. Ambulating. Complains of nausea without emesis and abdominal pain overnight. Urinating well. Vitals within normal limits. OBJECTIVE:   VITALS:  BP (!) 139/98   Pulse 84   Temp 99.5 °F (37.5 °C) (Oral)   Resp 16   Ht 5' 4\" (1.626 m)   Wt 229 lb (103.9 kg)   LMP 03/15/2021 (Approximate)   SpO2 93%   BMI 39.31 kg/m²      INTAKE/OUTPUT:      Intake/Output Summary (Last 24 hours) at 4/6/2021 0701  Last data filed at 4/6/2021 0400  Gross per 24 hour   Intake 2082 ml   Output 1595 ml   Net 487 ml       PHYSICAL EXAM:  General Appearance: awake, alert, oriented, in no acute distress  HEENT:  Normocephalic, atraumatic, mucus membranes moist   Heart: Heart regular rate and rhythm  Lungs: No respiratory distress  Abdomen: Soft, TTP around incisions - appropriate post op pain. No peritoneal signs. ANTONIA with serosanguinous output. Extremities: No cyanosis, pitting edema, rashes noted. Skin: Skin color, texture, turgor normal. No rashes or lesions.       Data:  CBC with Differential:    Lab Results   Component Value Date    WBC 10.9 04/06/2021    RBC 3.79 04/06/2021    HGB 9.9 04/06/2021    HCT 32.1 04/06/2021     04/06/2021    MCV 84.7 04/06/2021    MCH 26.1 04/06/2021    MCHC 30.8 04/06/2021    RDW 14.1 04/06/2021    LYMPHOPCT 32 07/03/2020    MONOPCT 9 07/03/2020    BASOPCT 1 07/03/2020    MONOSABS 0.67 07/03/2020    LYMPHSABS 2.30 07/03/2020    EOSABS 0.17 07/03/2020    BASOSABS 0.05 07/03/2020    DIFFTYPE NOT REPORTED 07/03/2020     BMP:    Lab Results   Component Value Date     04/06/2021    K 3.7 04/06/2021    CL 94 04/06/2021    CO2 25 04/06/2021    BUN 11 04/06/2021    LABALBU 3.9 07/03/2020    CREATININE 0.91 04/06/2021    CALCIUM 8.9 04/06/2021    GFRAA >60 04/06/2021    LABGLOM >60 04/06/2021    GLUCOSE 88 04/06/2021       Radiology Review:      ASSESSMENT:  Active Hospital Problems    Diagnosis Date Noted    S/P laparoscopic sleeve gastrectomy [Z98.84] 04/05/2021    Morbid obesity with BMI of 40.0-44.9, adult ([facility] [address] [E66.01, Z68.41] 10/15/2020    Hiatal hernia with gastroesophageal reflux [K21.9, K44.9] 07/27/2020     4/5: Robo Hiatal hernia repair and sleeve gastrectomy    Plan:  1. Diet: CLD  2. UGI today  3. IVF at 125 cc/hr until UGI complete  4. Encourage ambulation  5. Pain control: Dilaudid/percocet  6. Start heparin today --  H/h stable  7.  Dispo next 24-48 hours    Electronically signed by Siomara Shukla DO  on 4/6/2021 at 7:01 AM

## 2021-04-07 ENCOUNTER — TELEPHONE (OUTPATIENT)
Dept: BARIATRICS/WEIGHT MGMT | Age: 41
End: 2021-04-07

## 2021-04-07 NOTE — TELEPHONE ENCOUNTER
Post-op outreach call made to pt. Pt reports frequent ambulation around home. Pt wearing abd binder. No complaints of SOB or tachycardia. Laparoscopic incisional sites without problems. Drain: dark red in color-draining once a day. Pt has not had a BM since surgery. Passing flatus. Agrees to use Milk of Magnesia and monitor for BM. Pt reports urine output of at least 4 times in a 24 hour period. Intake is described as water,gatorade with no sugar, SF popsicle, protein water-sipping constantly. She will try protein shake. Rates pain as 0 on a 0-10 scale. Pt using pain medication as directed. Allowed pt the opportunity to ask questions. Reminded patient to reference the patient education materials as needed. Reminded pt that they may phone the office or the \"after hours telephone number\"  that is listed in the patient education binder as needed. Pt verbalized understanding. Pt without concerns at this time. Post op office appt date and time reviewed with pt.

## 2021-04-07 NOTE — DISCHARGE SUMMARY
Surgery Discharge Summary     Patient Identification  Peterson Garsia is a 41 y.o. female. :  1980  Admit Date:  2021    Discharge date:   2021  6:18 PM                                   Disposition: home    Discharge Diagnoses:   Patient Active Problem List   Diagnosis    Hyperlipidemia    Essential hypertension    Hypokalemia    Vitamin D deficiency    Obesity (BMI 30-39. 9)    Hiatal hernia with gastroesophageal reflux    Prediabetes    Thiamine deficiency    TRISHA (obstructive sleep apnea)    Morbid obesity with BMI of 40.0-44.9, adult ([facility])    S/P laparoscopic sleeve gastrectomy    Status post laparoscopic sleeve gastrectomy       Condition on discharge: Good    Consults: None    Surgery: Robotic assisted laparoscopic sleeve gastrectomy and hiatal hernia repair    Patient Instructions: Activity: no heavy lifting, pushing, pulling for 6 weeks, no driving for 2 weeks or while on analgesics  Diet: Clear liquid diet  Follow-up with Dr. Marva Duarte in 1 week    See pre-printed instructions in chart and given to patient upon discharge. Discharge Medications:      [facility] Medication Instructions [facility]:[id number]    Printed on:21 1204   Medication Information                      amLODIPine (NORVASC) 5 MG tablet  Take 1 tablet by mouth daily             famotidine (PEPCID) 20 MG tablet  Take 1 tablet by mouth 2 times daily             Metoprolol Succinate 25 MG CS24  Take by mouth 1/2 tab daily per Dr. Padilla Valladares             montelukast (SINGULAIR) 10 MG tablet  TAKE 1 TABLET BY MOUTH DAILY             oxyCODONE-acetaminophen (PERCOCET) 5-325 MG per tablet  Take 1 tablet by mouth every 6 hours as needed for Pain for up to 7 days. Intended supply: 7 days.  Take lowest dose possible to manage pain             potassium chloride (KLOR-CON M) 20 MEQ extended release tablet  Take 2 tablets by mouth 2 times daily             promethazine (PHENERGAN) 25 MG tablet  Take 1 tablet by mouth every 6 hours as needed for Nausea                  HPI and Hospital Course:   41 y.o. female presented on 4/5/2021 for robotic assisted laparoscopic sleeve gastrectomy and hiatal hernia repair. Patient did well with surgery. Postop day 1 she underwent an upper GI which was negative. She is tolerating sips of clears to stay hydrated, ambulating, pain was controlled on oral medication. On postop day 1 she was deemed medically stable for discharge.       Electronically signed by Todd Timmons DO on 4/7/2021 at 12:04 PM

## 2021-04-15 ENCOUNTER — OFFICE VISIT (OUTPATIENT)
Dept: BARIATRICS/WEIGHT MGMT | Age: 41
End: 2021-04-15

## 2021-04-15 ENCOUNTER — HOSPITAL ENCOUNTER (OUTPATIENT)
Dept: ONCOLOGY | Age: 41
Discharge: HOME | End: 2021-04-15
Attending: SURGERY | Admitting: SURGERY
Payer: COMMERCIAL

## 2021-04-15 VITALS
SYSTOLIC BLOOD PRESSURE: 138 MMHG | RESPIRATION RATE: 16 BRPM | DIASTOLIC BLOOD PRESSURE: 90 MMHG | HEART RATE: 94 BPM | TEMPERATURE: 99 F

## 2021-04-15 VITALS
HEART RATE: 108 BPM | BODY MASS INDEX: 37.56 KG/M2 | DIASTOLIC BLOOD PRESSURE: 86 MMHG | SYSTOLIC BLOOD PRESSURE: 120 MMHG | WEIGHT: 220 LBS | HEIGHT: 64 IN

## 2021-04-15 DIAGNOSIS — Z98.84 STATUS POST LAPAROSCOPIC SLEEVE GASTRECTOMY: Primary | ICD-10-CM

## 2021-04-15 PROBLEM — E86.0 DEHYDRATION: Status: ACTIVE | Noted: 2021-04-15

## 2021-04-15 PROCEDURE — 96360 HYDRATION IV INFUSION INIT: CPT

## 2021-04-15 PROCEDURE — 96361 HYDRATE IV INFUSION ADD-ON: CPT

## 2021-04-15 PROCEDURE — 99024 POSTOP FOLLOW-UP VISIT: CPT | Performed by: SURGERY

## 2021-04-15 PROCEDURE — 2580000003 HC RX 258: Performed by: SURGERY

## 2021-04-15 RX ORDER — 0.9 % SODIUM CHLORIDE 0.9 %
1000 INTRAVENOUS SOLUTION INTRAVENOUS ONCE
Status: COMPLETED | OUTPATIENT
Start: 2021-04-15 | End: 2021-04-15

## 2021-04-15 RX ADMIN — SODIUM CHLORIDE 1000 ML: 9 INJECTION, SOLUTION INTRAVENOUS at 15:21

## 2021-04-15 RX ADMIN — SODIUM CHLORIDE 1000 ML: 9 INJECTION, SOLUTION INTRAVENOUS at 16:20

## 2021-04-17 ENCOUNTER — NURSE TRIAGE (OUTPATIENT)
Dept: OTHER | Age: 41
End: 2021-04-17

## 2021-04-17 ENCOUNTER — HOSPITAL ENCOUNTER (EMERGENCY)
Age: 41
Discharge: HOME | End: 2021-04-17
Attending: EMERGENCY MEDICINE
Payer: COMMERCIAL

## 2021-04-17 ENCOUNTER — APPOINTMENT (OUTPATIENT)
Dept: CT IMAGING | Age: 41
End: 2021-04-17
Payer: COMMERCIAL

## 2021-04-17 VITALS
HEART RATE: 88 BPM | OXYGEN SATURATION: 97 % | TEMPERATURE: 97.4 F | SYSTOLIC BLOOD PRESSURE: 156 MMHG | DIASTOLIC BLOOD PRESSURE: 78 MMHG | RESPIRATION RATE: 16 BRPM

## 2021-04-17 DIAGNOSIS — N20.0 KIDNEY STONE: Primary | ICD-10-CM

## 2021-04-17 LAB
ABSOLUTE EOS #: 0.03 K/UL (ref 0–0.44)
ABSOLUTE IMMATURE GRANULOCYTE: 0.04 K/UL (ref 0–0.3)
ABSOLUTE LYMPH #: 1.14 K/UL (ref 1.1–3.7)
ABSOLUTE MONO #: 0.68 K/UL (ref 0.1–1.2)
ALBUMIN SERPL-MCNC: 4 G/DL (ref 3.5–5.2)
ALBUMIN/GLOBULIN RATIO: 0.9 (ref 1–2.5)
ALP BLD-CCNC: 42 U/L (ref 35–104)
ALT SERPL-CCNC: 36 U/L (ref 5–33)
ANION GAP SERPL CALCULATED.3IONS-SCNC: 13 MMOL/L (ref 9–17)
AST SERPL-CCNC: 18 U/L
BASOPHILS # BLD: 0 % (ref 0–2)
BASOPHILS ABSOLUTE: 0.04 K/UL (ref 0–0.2)
BILIRUB SERPL-MCNC: 0.25 MG/DL (ref 0.3–1.2)
BILIRUBIN DIRECT: 0.11 MG/DL
BILIRUBIN URINE: NEGATIVE
BILIRUBIN, INDIRECT: 0.14 MG/DL (ref 0–1)
BUN BLDV-MCNC: 9 MG/DL (ref 6–20)
BUN/CREAT BLD: ABNORMAL (ref 9–20)
CALCIUM SERPL-MCNC: 9.3 MG/DL (ref 8.6–10.4)
CHLORIDE BLD-SCNC: 100 MMOL/L (ref 98–107)
CO2: 23 MMOL/L (ref 20–31)
COLOR: YELLOW
COMMENT UA: ABNORMAL
CREAT SERPL-MCNC: 0.82 MG/DL (ref 0.5–0.9)
DIFFERENTIAL TYPE: ABNORMAL
EOSINOPHILS RELATIVE PERCENT: 0 % (ref 1–4)
GFR AFRICAN AMERICAN: >60 ML/MIN
GFR NON-AFRICAN AMERICAN: >60 ML/MIN
GFR SERPL CREATININE-BSD FRML MDRD: ABNORMAL ML/MIN/{1.73_M2}
GFR SERPL CREATININE-BSD FRML MDRD: ABNORMAL ML/MIN/{1.73_M2}
GLOBULIN: ABNORMAL G/DL (ref 1.5–3.8)
GLUCOSE BLD-MCNC: 115 MG/DL (ref 70–99)
GLUCOSE URINE: NEGATIVE
HCG QUALITATIVE: NEGATIVE
HCT VFR BLD CALC: 34.1 % (ref 36.3–47.1)
HEMOGLOBIN: 10.4 G/DL (ref 11.9–15.1)
IMMATURE GRANULOCYTES: 0 %
KETONES, URINE: ABNORMAL
LEUKOCYTE ESTERASE, URINE: NEGATIVE
LIPASE: 73 U/L (ref 13–60)
LYMPHOCYTES # BLD: 10 % (ref 24–43)
MCH RBC QN AUTO: 26.2 PG (ref 25.2–33.5)
MCHC RBC AUTO-ENTMCNC: 30.5 G/DL (ref 28.4–34.8)
MCV RBC AUTO: 85.9 FL (ref 82.6–102.9)
MONOCYTES # BLD: 6 % (ref 3–12)
NITRITE, URINE: NEGATIVE
NRBC AUTOMATED: 0 PER 100 WBC
PDW BLD-RTO: 14.2 % (ref 11.8–14.4)
PH UA: 5.5 (ref 5–8)
PLATELET # BLD: 585 K/UL (ref 138–453)
PLATELET ESTIMATE: ABNORMAL
PMV BLD AUTO: 8.9 FL (ref 8.1–13.5)
POTASSIUM SERPL-SCNC: 4.1 MMOL/L (ref 3.7–5.3)
PROTEIN UA: NEGATIVE
RBC # BLD: 3.97 M/UL (ref 3.95–5.11)
RBC # BLD: ABNORMAL 10*6/UL
SEG NEUTROPHILS: 84 % (ref 36–65)
SEGMENTED NEUTROPHILS ABSOLUTE COUNT: 9.97 K/UL (ref 1.5–8.1)
SODIUM BLD-SCNC: 136 MMOL/L (ref 135–144)
SPECIFIC GRAVITY UA: 1.02 (ref 1–1.03)
TOTAL PROTEIN: 8.7 G/DL (ref 6.4–8.3)
TURBIDITY: CLEAR
URINE HGB: NEGATIVE
UROBILINOGEN, URINE: NORMAL
WBC # BLD: 11.9 K/UL (ref 3.5–11.3)
WBC # BLD: ABNORMAL 10*3/UL

## 2021-04-17 PROCEDURE — 80048 BASIC METABOLIC PNL TOTAL CA: CPT

## 2021-04-17 PROCEDURE — 74176 CT ABD & PELVIS W/O CONTRAST: CPT

## 2021-04-17 PROCEDURE — 80076 HEPATIC FUNCTION PANEL: CPT

## 2021-04-17 PROCEDURE — 83690 ASSAY OF LIPASE: CPT

## 2021-04-17 PROCEDURE — 99285 EMERGENCY DEPT VISIT HI MDM: CPT

## 2021-04-17 PROCEDURE — 2580000003 HC RX 258: Performed by: GENERAL PRACTICE

## 2021-04-17 PROCEDURE — 6360000004 HC RX CONTRAST MEDICATION: Performed by: STUDENT IN AN ORGANIZED HEALTH CARE EDUCATION/TRAINING PROGRAM

## 2021-04-17 PROCEDURE — 85025 COMPLETE CBC W/AUTO DIFF WBC: CPT

## 2021-04-17 PROCEDURE — 81003 URINALYSIS AUTO W/O SCOPE: CPT

## 2021-04-17 PROCEDURE — 84703 CHORIONIC GONADOTROPIN ASSAY: CPT

## 2021-04-17 RX ORDER — 0.9 % SODIUM CHLORIDE 0.9 %
1000 INTRAVENOUS SOLUTION INTRAVENOUS ONCE
Status: COMPLETED | OUTPATIENT
Start: 2021-04-17 | End: 2021-04-17

## 2021-04-17 RX ORDER — OXYCODONE HYDROCHLORIDE AND ACETAMINOPHEN 5; 325 MG/1; MG/1
2 TABLET ORAL ONCE
Status: DISCONTINUED | OUTPATIENT
Start: 2021-04-17 | End: 2021-04-17 | Stop reason: HOSPADM

## 2021-04-17 RX ADMIN — IOHEXOL 30 ML: 240 INJECTION, SOLUTION INTRATHECAL; INTRAVASCULAR; INTRAVENOUS; ORAL at 16:43

## 2021-04-17 RX ADMIN — SODIUM CHLORIDE 1000 ML: 9 INJECTION, SOLUTION INTRAVENOUS at 12:29

## 2021-04-17 ASSESSMENT — PAIN DESCRIPTION - DESCRIPTORS: DESCRIPTORS: CRAMPING;SHARP

## 2021-04-17 ASSESSMENT — PAIN DESCRIPTION - FREQUENCY: FREQUENCY: CONTINUOUS

## 2021-04-17 ASSESSMENT — PAIN SCALES - GENERAL: PAINLEVEL_OUTOF10: 10

## 2021-04-17 ASSESSMENT — PAIN DESCRIPTION - PAIN TYPE: TYPE: ACUTE PAIN

## 2021-04-17 ASSESSMENT — PAIN DESCRIPTION - ORIENTATION: ORIENTATION: RIGHT

## 2021-04-17 NOTE — ED PROVIDER NOTES
[facility]  Emergency Department  Senior Resident Attestation     Primary Care Physician  Brennan Brady, APRN - CNP    I performed a history and physical examination of the patient and discussed management with the steven resident. I reviewed the steven residents note and agree with the documented findings and plan of care. Any areas of disagreement are noted on the chart. Case was then discussed with Faculty Attending Supervisor for additional medical management. PERTINENT ATTENDING PHYSICIAN COMMENTS:    HISTORY:   Jesica Nix is a 41 y.o. female who  has a past medical history of GERD (gastroesophageal reflux disease), Hyperlipidemia, Hypertension, Major depressive disorder, recurrent episode, mild ([address] (10/21/2015), Obesity, Class III, BMI 40-49.9 (morbid obesity) ([facility] [address] TRISHA (obstructive sleep apnea), Pre-diabetes, Presence of tooth-root and mandibular implants, Tachycardia, and Wears contact lenses. and presents with complaint of diffuse abdominal pain, patient is postop from a gastric sleeve on April 6 of this month with Dr. Darrell Medrano. Patient states she was feeling fine a week ago, patient states that she has been having abdominal pain, rating down into her vagina, she is also having pain with urination, dysuria. Patient denies any fever but does admit to nausea, decreased appetite and difficulty eating. Patient is objects obviously uncomfortable in the emergency department. Patient states she tried to take a Percocet at home that did not help with her symptoms.     PHYSICAL:   Temp: 97.4 °F (36.3 °C),   ,  ,  ,    Gen: Non-toxic, Afebrile  Neck: Supple  Cards: Regular rate and rhythm  Pulm: Lung sounds clear to auscultation  Abdomen: Soft, non-tender, non-distended, diffusely tender, but nonperitoneal, no rigidity, no guarding  Skin: warm, dry  Extremities: pulses 2+ radial / dorsalis pedis, no clubbing, cyanosis, edema    IMPRESSION:   Uncomfortable appearing female who comes in for diffuse abdominal pain, she is postop from a gastric sleeve in the beginning of April. Patient states she tried taking a Percocet at home. Patient states that a week ago she was feeling fine, patient has been following her diet appropriately. Patient denies any fever, chills but does admit to nausea but no emesis. Patient is also admitting to urinary tract infection-like symptoms. Patient is alert and oriented speaking full sentences. Patient states that she is having urinary frequency.     PLAN:   Ct abdomen pelvis  Bariatric surgery consult   Cbc, bmp, ekg, ua, lft   Likely d/c after surgery follow  Up     CRITICAL CARE TIME:    None    Deangelo Fatima DO  Senior Resident Physician    (Please note that portions of this note were completed with a voice recognition program.  Efforts were made to edit the dictations but occasionally words are mis-transcribed.)        Deangelo Fatima DO  Resident  04/20/21 3719

## 2021-04-17 NOTE — ED PROVIDER NOTES
FACULTY SIGN-OUT  ADDENDUM       Patient: Wil Perez   MRN: [id number]  PCP:  LUZ Lopez CNP  Attestation  I was available and discussed any additional care issues that arose and coordinated the management plans with the resident(s) caring for the patient during my duty period. Any areas of disagreement with resident's documentation of care or procedures are noted on the chart. I was personally present for the key portions of any/all procedures during my duty period. I have documented in the chart those procedures where I was not present during the key portions. The patient's initial evaluation and plan have been discussed with the prior provider who initially evaluated the patient. Pertinent Comments:   The patient is a 41 y.o. female taken in signout with recent gastric sleeve operation by Dr. Hodge here with abdominal pain  Bariatric surgery already aware of the patient as well  We are awaiting laboratories and CT    ED COURSE      The patient was given the following medications:  Orders Placed This Encounter   Medications    0.9 % sodium chloride bolus    oxyCODONE-acetaminophen (PERCOCET) 5-325 MG per tablet 2 tablet    iohexol (OMNIPAQUE 240) injection 30 mL       RECENT VITALS:   BP: (!) 156/78  Pulse: 88  Resp: 16  Temp: 97.4 °F (36.3 °C) SpO2: 97 %    (Please note that portions of this note were completed with a voice recognition program.  Efforts were made to edit the dictations but occasionally words are mis-transcribed.)    MD Roby Watson  Attending Emergency Medicine Physician       Martin Serrato MD  04/17/21 3555

## 2021-04-17 NOTE — ED NOTES
Refused pain medication offered and refused IV start, Dr Dread Jarvis aware.       Aydee Walton, RN  04/17/21 7203

## 2021-04-17 NOTE — CONSULTS
Bariatric Surgery:    Consult Note      PATIENT NAME: Dewayne Mijares OF BIRTH: 1980    ADMISSION DATE: 4/17/2021 11:36 AM     Consulted Physician: Dr. Paola Cavazos      TODAY'S DATE: 4/17/2021    Chief Complaint:  Right sided abdominal pain   Consult Regarding:  Above     HISTORY OF PRESENT ILLNESS:  The patient is a 41 y.o. female with history of obesity and recent robotic assisted laparoscopic sleeve gastrectomy and hiatal hernia repair (4/5/2021) who presents with a 1 day history of right lower quadrant and right sided flank pain. She states the pain started yesterday and has been persistent since that time. She did take a percocet at home with some relief. She denies having a pain like this one in the past. Denies dysuria, hematuria. No prior history of kidney stones. She endorses 1x throwing up clear liquid today. The patient states she has been progressing well postop and pain was controlled without opioid medications. She has been tolerating a full liquid diet without emesis. She is passing flatus and she had a formed bowel movement this morning. She has been ambulating and performing her daily activities without difficulty. Denies fevers, chills. On exam, she is afebrile and HD stable. Abdomen is soft and nondistended with some focal TTP in the RLQ pain. Slight leukocytosis, but otherwise labs wnl. HgB stable. CT scan with PO contrast was performed to evaluate (original plan for IV contrast as well, however, patient was a difficult stick and declined allowing for additional attempts at IV access). CT without evidence of leak, hernia, or obstruction. There was some perinephric stranding on the right that could be indicative that a stone had past. The patient was given IV fluids in the ED and her pain improved during her stay without any pain medications.      Past Medical History:        Diagnosis Date    GERD (gastroesophageal reflux disease)     Hyperlipidemia     not on rx    Hypertension managed per pcp    Major depressive disorder, recurrent episode, mild ([facility] [address] 10/21/2015    Palmira Hollingsworth    Obesity, Class III, BMI 40-49.9 (morbid obesity) ([facility] [address]     TRISHA (obstructive sleep apnea)     Saw Dr. Sherry De Jesus for clearance for surgery    Pre-diabetes     diet control    Presence of tooth-root and mandibular implants     all top teeth    Tachycardia     per pt. Saw Dr. Jose Luis Celeste 2020    Wears contact lenses     Instructed not to wear contacts for surgery       Past Surgical History:        Procedure Laterality Date     SECTION      x1    SLEEVE GASTRECTOMY  2021    LAPAROSCOPIC ROBOTIC GASTRECTOMY SLEEVE, HIATAL HERNIA REPAIR, EGD     SLEEVE GASTRECTOMY N/A 2021    XI LAPAROSCOPIC ROBOTIC GASTRECTOMY SLEEVE, HIATAL HERNIA REPAIR, EGD - GI UNIT SCHEDULED. performed by Albertina Daily, DO at [place] [id number] N/A 2021    EGD BIOPSY performed by Albertina Daily, DO at [facility]       Medications Prior to Admission:   Not in a hospital admission. Allergies:  Patient has no known allergies.     Social History:   Social History     Socioeconomic History    Marital status: Single     Spouse name: Not on file    Number of children: Not on file    Years of education: Not on file    Highest education level: Not on file   Occupational History    Not on file   Social Needs    Financial resource strain: Not on file    Food insecurity     Worry: Not on file     Inability: Not on file    Transportation needs     Medical: Not on file     Non-medical: Not on file   Tobacco Use    Smoking status: Never Smoker    Smokeless tobacco: Never Used   Substance and Sexual Activity    Alcohol use: No     Alcohol/week: 0.0 standard drinks    Drug use: No    Sexual activity: Not Currently     Partners: Male   Lifestyle    Physical activity     Days per week: Not on file     Minutes per session: Not on file    Stress: Not on file Relationships    Social connections     Talks on phone: Not on file     Gets together: Not on file     Attends Latter day service: Not on file     Active member of club or organization: Not on file     Attends meetings of clubs or organizations: Not on file     Relationship status: Not on file    Intimate partner violence     Fear of current or ex partner: Not on file     Emotionally abused: Not on file     Physically abused: Not on file     Forced sexual activity: Not on file   Other Topics Concern    Not on file   Social History Narrative    Not on file       Family History:       Problem Relation Age of Onset    Hypertension Mother     Asthma Mother     Hypertension Maternal Aunt     Asthma Maternal Uncle     Hypertension Maternal Uncle     Arthritis Maternal Grandmother     Hypertension Maternal Grandmother     Stroke Maternal Grandmother     Breast Cancer Neg Hx     Cancer Neg Hx     Colon Cancer Neg Hx     Diabetes Neg Hx     Eclampsia Neg Hx     Ovarian Cancer Neg Hx      Labor Neg Hx     Spont Abortions Neg Hx        REVIEW OF SYSTEMS:    CONSTITUTIONAL:  No fever, chills   HEENT:  No nasal congestion or drainage.   CARDIOVASCULAR:  No chest pain, no palpitations   GASTROINTESTINAL: Endorses R flank and RLQ pain, endorses 1x clear liquid spit up, denies nausea, denies obstipation  GENITOURINARY:  No dysuria, no hematuria   HEMATOLOGIC/LYMPHATIC:  No easy bruising or bleeding   ENDOCRINE: No polydipsia, no heat or cold intolerance     PHYSICAL EXAM:    VITALS:  BP (!) 156/78   Pulse 88   Temp 97.4 °F (36.3 °C) (Tympanic)   Resp 16   SpO2 97%   INTAKE/OUTPUT:   No intake or output data in the 24 hours ending 21    CONSTITUTIONAL:  awake, alert, not distressed   ENT: NCAT, EOMI  NECK:  supple, symmetrical, trachea midline   LUNGS:  Normal effort on RA, no respiratory distress, no accessory muscle use   CARDIOVASCULAR: Regular rate and rhythm   ABDOMEN: Soft, nondistended, focal TTP in the RLQ without guarding or peritoneal signs, incisions CDI and without erythema   MUSCULOSKELETAL:  No joint swelling, deformity, or tenderness     CBC:   Lab Results   Component Value Date    WBC 11.9 04/17/2021    RBC 3.97 04/17/2021    HGB 10.4 04/17/2021    HCT 34.1 04/17/2021    MCV 85.9 04/17/2021    MCH 26.2 04/17/2021    MCHC 30.5 04/17/2021    RDW 14.2 04/17/2021     04/17/2021    MPV 8.9 04/17/2021     BMP:    Lab Results   Component Value Date     04/17/2021    K 4.1 04/17/2021     04/17/2021    CO2 23 04/17/2021    BUN 9 04/17/2021    LABALBU 4.0 04/17/2021    CREATININE 0.82 04/17/2021    CALCIUM 9.3 04/17/2021    GFRAA >60 04/17/2021    LABGLOM >60 04/17/2021    GLUCOSE 115 04/17/2021       Pertinent Radiology:   Ct Abdomen Pelvis Wo Contrast Additional Contrast? Oral    Result Date: 4/17/2021  EXAMINATION: CT OF THE ABDOMEN AND PELVIS WITHOUT CONTRAST 4/17/2021 4:41 pm TECHNIQUE: CT of the abdomen and pelvis was performed without the administration of intravenous contrast. Multiplanar reformatted images are provided for review. Dose modulation, iterative reconstruction, and/or weight based adjustment of the mA/kV was utilized to reduce the radiation dose to as low as reasonably achievable. COMPARISON: None. HISTORY: ORDERING SYSTEM PROVIDED HISTORY: abdominal pain h/o gastric sleeve TECHNOLOGIST PROVIDED HISTORY: [facility] protocol abdominal pain h/o gastric sleeve Decision Support Exception->Emergency Medical Condition (MA) Is the patient pregnant?->No Reason for Exam: abdominal pain h/o gastric sleeve, h/o gastric sleeve FINDINGS: Lower Chest: The visualized heart and lungs show no acute abnormalities. Organs: Liver, spleen, pancreas, adrenal glands and gallbladder show no significant abnormalities. Nonobstructing 7 mm calculus upper pole left kidney.   No right renal calculus but mild right hydronephrosis and perinephric stranding and mild dilatation of scheduled office appointment         Vivienne Teresa DO  General Surgery PGY-2     Attending Physician Statement  I have discussed the case with Dr Ngoc Carpenter, including pertinent history and exam findings with the resident. I have seen and examined the patient and the key elements of the encounter have been performed by me. I agree with the assessment, plan and orders as documented by the resident.       Electronically signed by Wendy Corley IV, DO  on 4/18/2021 at 4:12 PM

## 2021-04-17 NOTE — ED NOTES
Attempted IV start due to poor vascular status unable to place.      Cecilia Courser, RN  04/17/21 7706

## 2021-04-17 NOTE — TELEPHONE ENCOUNTER
Reason for Disposition   [1] Caller has URGENT question AND [2] triager unable to answer question    Answer Assessment - Initial Assessment Questions  1. SYMPTOM: \"What's the main symptom you're concerned about? \" (e.g., pain, fever, vomiting)      Urge to urinate but little is coming out; has dribbling; states may have UTI; pain on the right side of her body  2. ONSET: \"When did *No Answer*  start? \"      Last night and entire night; this morning her right side of her body started to hurt  3. SURGERY: \"What surgery was performed? \"      Gastri sleeve  4. DATE of SURGERY: \"When was surgery performed?\"       April 5, 2021  5. ANESTHESIA: \" What type of anesthesia did you have? \" (e.g., general, spinal, epidural, local)      General  6. PAIN: \"Is there any pain? \" If so, ask: \"How bad is it? \"  (Scale 1-10; or mild, moderate, severe)      Right side body pain; 7/10 of pain scale  7. FEVER: \"Do you have a fever? \" If so, ask: \"What is your temperature, how was it measured, and when did it start? \"      Denies  8. VOMITING: \"Is there any vomiting? \" If yes, ask: \"How many times? \"      Denies  9. BLEEDING: \"Is there any bleeding? \" If so, ask: \"How much? \" and \"Where? \"      Denies  10. OTHER SYMPTOMS: \"Do you have any other symptoms? \" (e.g., drainage from wound, painful urination, constipation)       Denies any other symptoms.     Protocols used: POST-OP SYMPTOMS AND QUESTIONS-[facility]

## 2021-04-18 NOTE — PROGRESS NOTES
Medical Nutrition Therapy  Metabolic and Bariatric surgery  1 week Post operative follow up         Pt reports: Pt to start MV and Ca        Vitals:   Vitals:    04/15/21 1009   BP: 120/86   Site: Left Upper Arm   Position: Sitting   Cuff Size: Large Adult   Pulse: 108   Weight: 220 lb (99.8 kg)   Height: 5' 4\" (1.626 m)      Body mass index is 37.76 kg/m². Labs reviewed:              Nutrition Assessment:   PES: Inadequate food and beverage intake r/t WLS as evidenced by loss of excess body weight lost 9 lbs over 1 WEEK. Goals   60-80gm of protein  48-64oz of fluid       [] met     [x]  Not met        Plan:  Pt questions answered re diet advancement;  F/u 5 weeks post-op; Pt sent for IV fluids.        Misty Rodriguez
Hysterectomy   [] s/p Tubal ligation   [] Other:     Fulls    Ambulation    Overall doing well    ANTONIA removed, no signs of infection    Follow up: No follow-ups on file. Orders placed this encounter:   No orders of the defined types were placed in this encounter. New Prescriptions:   No orders of the defined types were placed in this encounter. Electronically signed by Araceli Huang DO on 4/18/2021 at 2:47 PM    Please note that this chart was generated using voice recognition Dragon dictation software. Although every effort was made to ensure the accuracy of this automated transcription, some errors in transcription may have occurred.

## 2021-04-20 NOTE — ED PROVIDER NOTES
drinks    Drug use: No    Sexual activity: Not Currently     Partners: Male   Lifestyle    Physical activity     Days per week: Not on file     Minutes per session: Not on file    Stress: Not on file   Relationships    Social connections     Talks on phone: Not on file     Gets together: Not on file     Attends Zoroastrianism service: Not on file     Active member of club or organization: Not on file     Attends meetings of clubs or organizations: Not on file     Relationship status: Not on file    Intimate partner violence     Fear of current or ex partner: Not on file     Emotionally abused: Not on file     Physically abused: Not on file     Forced sexual activity: Not on file   Other Topics Concern    Not on file   Social History Narrative    Not on file       Family History   Problem Relation Age of Onset    Hypertension Mother     Asthma Mother     Hypertension Maternal Aunt     Asthma Maternal Uncle     Hypertension Maternal Uncle     Arthritis Maternal Grandmother     Hypertension Maternal Grandmother     Stroke Maternal Grandmother     Breast Cancer Neg Hx     Cancer Neg Hx     Colon Cancer Neg Hx     Diabetes Neg Hx     Eclampsia Neg Hx     Ovarian Cancer Neg Hx      Labor Neg Hx     Spont Abortions Neg Hx        Allergies:  Patient has no known allergies. Home Medications:  Prior to Admission medications    Medication Sig Start Date End Date Taking?  Authorizing Provider   famotidine (PEPCID) 20 MG tablet Take 1 tablet by mouth 2 times daily 21  Yes Melissa Herbert DO   potassium chloride (KLOR-CON M) 20 MEQ extended release tablet Take 2 tablets by mouth 2 times daily 21  Yes LUZ Yuen - CNP   montelukast (SINGULAIR) 10 MG tablet TAKE 1 TABLET BY MOUTH DAILY 3/18/21  Yes LUZ Kelly - CNP   amLODIPine (NORVASC) 5 MG tablet Take 1 tablet by mouth daily 10/19/20  Yes Palmira Llamas APRN - CNP   Metoprolol Succinate 25 MG CS24 Take by mouth 1/2 tab daily per Dr. Abundio Barker   Yes Historical Provider, MD       REVIEW OF SYSTEMS    (2-9 systems for level 4, 10 or more for level 5)      Review of Systems    Review of Systems   Constitutional: Negative for chills and fever. HENT: Negative for sore throat. Eyes: Negative for pain. Respiratory: Negative for cough. Cardiovascular: Negative for chest pain and palpitations. Gastrointestinal: Positive for N/V  Genitourinary: positive for right flank pain  Musculoskeletal: Negative for gait problem. Skin: Negative for wound. Neurological: Negative for headaches. PHYSICAL EXAM   (up to 7 for level 4, 8 or more for level 5)      INITIAL VITALS:   BP (!) 156/78   Pulse 88   Temp 97.4 °F (36.3 °C) (Tympanic)   Resp 16   SpO2 97%     Physical Exam   Gen. Appearance: patient appears well, nondistressed. Head: head atraumatic, normocephalic. Eyes: Extraocular movements intact. No scleral icterus  Mouth: Oropharynx clear and moist.  No oral lesions  Neck: Supple. No lymphadenopathy. Pulmonary: Lungs clear to auscultation bilaterally. No wheezing, rales or rhonchi   Cardiovascular: Regular rate and rhythm, no murmurs   Abdomen: Soft, nontender, no guarding or rebound, normal bowel sounds  Flank/Back: very mild tenderness with palpation to right CVA. No increased warmth to touch  Neurology: GCS 15. Oriented to person place and time.   moving all extremities   Skin: Warm, dry, well perfused        DIFFERENTIAL  DIAGNOSIS     PLAN (LABS / IMAGING / EKG):  Orders Placed This Encounter   Procedures    CT ABDOMEN PELVIS WO CONTRAST Additional Contrast? Oral    CBC Auto Differential    Basic Metabolic Panel w/ Reflex to MG    Urinalysis, reflex to microscopic    HCG Qualitative, Serum    Hepatic Function Panel    Lipase    Inpatient Consult to Bariatrics       MEDICATIONS ORDERED:  Orders Placed This Encounter   Medications    0.9 % sodium chloride bolus    DISCONTD: oxyCODONE-acetaminophen (PERCOCET) Gravity, UA 1.018 1.005 - 1.030    Urine Hgb NEGATIVE NEGATIVE    pH, UA 5.5 5.0 - 8.0    Protein, UA NEGATIVE NEGATIVE    Urobilinogen, Urine Normal Normal    Nitrite, Urine NEGATIVE NEGATIVE    Leukocyte Esterase, Urine NEGATIVE NEGATIVE    Urinalysis Comments       Microscopic exam not performed based on chemical results unless requested in original order. HCG Qualitative, Serum   Result Value Ref Range    hCG Qual NEGATIVE NEGATIVE   Hepatic Function Panel   Result Value Ref Range    Albumin 4.0 3.5 - 5.2 g/dL    Alkaline Phosphatase 42 35 - 104 U/L    ALT 36 (H) 5 - 33 U/L    AST 18 <32 U/L    Total Bilirubin 0.25 (L) 0.3 - 1.2 mg/dL    Bilirubin, Direct 0.11 <0.31 mg/dL    Bilirubin, Indirect 0.14 0.00 - 1.00 mg/dL    Total Protein 8.7 (H) 6.4 - 8.3 g/dL    Globulin NOT REPORTED 1.5 - 3.8 g/dL    Albumin/Globulin Ratio 0.9 (L) 1.0 - 2.5   Lipase   Result Value Ref Range    Lipase 73 (H) 13 - 60 U/L       RADIOLOGY:  None    EKG  None    All EKG's are interpreted by the Emergency Department Physician who either signs or Co-signs this chart in the absence of a cardiologist.    [address] [place] [address] MDM:  41 y.o. female who presents with right flank pain + N/V. Non toxic appearing. Recent bariatric surgery. Abd is soft. Labs plus consult to surgeon placed. Turned over to oncoming shift         ED Course as of Apr 23 1249   Sat Apr 17, 2021   1331 Bariatric surgery return page, they recommend oral contrast CT abdomen pelvis, will order study.     [ALEXX]   1536 Multiple IV attempts were performed, patient refusing to have further IV placement, will order CT abdomen pelvis with p.o. contrast.    [ALEXX]      ED Course User Index  [ALEXX] Pam Kc DO         PROCEDURES:  None    CONSULTS:  IP CONSULT TO BARIATRICS    CRITICAL CARE:  None    FINAL IMPRESSION      1. Kidney stone            DISPOSITION / PLAN     DISPOSITION Decision To Discharge 04/17/2021 06:04:00 PM      PATIENT REFERRED TO:  Ney Lee LUZ Cannon [facility]  [address]  [facility]  [phone]    Call   As needed    [facility] ED  [address] [place] [address]  [phone]  Call   As needed      DISCHARGE MEDICATIONS:  Discharge Medication List as of 4/17/2021  6:06 PM          Evan Dugan DO  Emergency Medicine Resident    (Please note that portions of thisnote were completed with a voice recognition program.  Efforts were made to edit the dictations but occasionally words are mis-transcribed.)        Evan Dugan DO  Resident  04/23/21 8921

## 2021-04-29 ENCOUNTER — OFFICE VISIT (OUTPATIENT)
Dept: FAMILY MEDICINE CLINIC | Age: 41
End: 2021-04-29
Payer: COMMERCIAL

## 2021-04-29 VITALS
SYSTOLIC BLOOD PRESSURE: 124 MMHG | WEIGHT: 214.4 LBS | OXYGEN SATURATION: 99 % | BODY MASS INDEX: 36.8 KG/M2 | TEMPERATURE: 98.2 F | HEART RATE: 103 BPM | DIASTOLIC BLOOD PRESSURE: 84 MMHG

## 2021-04-29 DIAGNOSIS — Z09 HOSPITAL DISCHARGE FOLLOW-UP: ICD-10-CM

## 2021-04-29 DIAGNOSIS — E87.6 HYPOKALEMIA: ICD-10-CM

## 2021-04-29 DIAGNOSIS — N20.0 KIDNEY STONE: ICD-10-CM

## 2021-04-29 DIAGNOSIS — I10 ESSENTIAL HYPERTENSION: Primary | ICD-10-CM

## 2021-04-29 DIAGNOSIS — Z98.84 S/P LAPAROSCOPIC SLEEVE GASTRECTOMY: ICD-10-CM

## 2021-04-29 PROCEDURE — 99214 OFFICE O/P EST MOD 30 MIN: CPT | Performed by: NURSE PRACTITIONER

## 2021-04-29 PROCEDURE — G8417 CALC BMI ABV UP PARAM F/U: HCPCS | Performed by: NURSE PRACTITIONER

## 2021-04-29 PROCEDURE — 1111F DSCHRG MED/CURRENT MED MERGE: CPT | Performed by: NURSE PRACTITIONER

## 2021-04-29 PROCEDURE — 1036F TOBACCO NON-USER: CPT | Performed by: NURSE PRACTITIONER

## 2021-04-29 PROCEDURE — G8427 DOCREV CUR MEDS BY ELIG CLIN: HCPCS | Performed by: NURSE PRACTITIONER

## 2021-04-29 RX ORDER — POTASSIUM CHLORIDE 20 MEQ/1
40 TABLET, EXTENDED RELEASE ORAL DAILY
Qty: 120 TABLET | Refills: 0
Start: 2021-04-29 | End: 2021-09-17

## 2021-04-29 RX ORDER — MULTIVIT-MIN/IRON FUM/FOLIC AC 7.5 MG-4
2 TABLET ORAL DAILY
Qty: 30 TABLET | Refills: 0 | COMMUNITY
Start: 2021-04-29

## 2021-04-29 ASSESSMENT — ENCOUNTER SYMPTOMS
CHEST TIGHTNESS: 0
ALLERGIC/IMMUNOLOGIC NEGATIVE: 1
COUGH: 0
SHORTNESS OF BREATH: 0
GASTROINTESTINAL NEGATIVE: 1
ABDOMINAL PAIN: 0
NAUSEA: 0
RESPIRATORY NEGATIVE: 1
VOMITING: 0
EYES NEGATIVE: 1
DIARRHEA: 0
COLOR CHANGE: 0

## 2021-04-29 ASSESSMENT — PATIENT HEALTH QUESTIONNAIRE - PHQ9
SUM OF ALL RESPONSES TO PHQ9 QUESTIONS 1 & 2: 0
1. LITTLE INTEREST OR PLEASURE IN DOING THINGS: 0
2. FEELING DOWN, DEPRESSED OR HOPELESS: 0
SUM OF ALL RESPONSES TO PHQ QUESTIONS 1-9: 0

## 2021-04-29 NOTE — PROGRESS NOTES
[facility]  [address] [facility]  Dept: [phone]    Noa Palomino is a 41 y.o. female who presents today for her medical conditions/complaintsas noted below. Noa Palomino is here today c/o Hypertension    Past Medical History:   Diagnosis Date    GERD (gastroesophageal reflux disease)     Hyperlipidemia     not on rx    Hypertension     managed per pcp    Major depressive disorder, recurrent episode, mild ([facility] 10/21/2015    Palmira Hollingsworth    Obesity, Class III, BMI 40-49.9 (morbid obesity) ([facility]     TRISHA (obstructive sleep apnea)     Saw Dr. Yordy Stout for clearance for surgery    Pre-diabetes     diet control    Presence of tooth-root and mandibular implants     all top teeth    Tachycardia     per pt.  Saw Dr. Bogdan Read 2020    Wears contact lenses     Instructed not to wear contacts for surgery      Past Surgical History:   Procedure Laterality Date     SECTION      x1    SLEEVE GASTRECTOMY  2021    LAPAROSCOPIC ROBOTIC GASTRECTOMY SLEEVE, HIATAL HERNIA REPAIR, EGD     SLEEVE GASTRECTOMY N/A 2021    XI LAPAROSCOPIC ROBOTIC GASTRECTOMY SLEEVE, HIATAL HERNIA REPAIR, EGD - GI UNIT SCHEDULED. performed by Leigha Devi DO at [address] ENDOSCOPY N/A 2021    EGD BIOPSY performed by Leigha Devi DO at hospitals [facility]       Family History   Problem Relation Age of Onset    Hypertension Mother     Asthma Mother     Hypertension Maternal Aunt     Asthma Maternal Uncle     Hypertension Maternal Uncle     Arthritis Maternal Grandmother     Hypertension Maternal Grandmother     Stroke Maternal Grandmother     Breast Cancer Neg Hx     Cancer Neg Hx     Colon Cancer Neg Hx     Diabetes Neg Hx     Eclampsia Neg Hx     Ovarian Cancer Neg Hx      Labor Neg Hx     Spont Abortions Neg Hx        Social History     Tobacco Use    Smoking status: Never Smoker    Smokeless Endocrine: Negative. Genitourinary: Negative. Negative for difficulty urinating. Musculoskeletal: Negative. Negative for myalgias. Skin: Negative. Negative for color change, pallor, rash and wound. Allergic/Immunologic: Negative. Neurological: Negative. Negative for dizziness, seizures, speech difficulty, weakness and headaches. Hematological: Negative. Psychiatric/Behavioral: Negative. Objective:     Vitals:    04/29/21 1339   BP: 124/84   Pulse: 103   Temp: 98.2 °F (36.8 °C)   SpO2: 99%       Body mass index is 36.8 kg/m². Physical Exam  Constitutional:       General: She is not in acute distress. Appearance: Normal appearance. She is well-developed. She is obese. She is not ill-appearing, toxic-appearing or diaphoretic. HENT:      Head: Normocephalic and atraumatic. Right Ear: External ear normal.      Left Ear: External ear normal.      Nose: Nose normal.   Eyes:      General: No scleral icterus. Right eye: No discharge. Left eye: No discharge. Conjunctiva/sclera: Conjunctivae normal.      Pupils: Pupils are equal, round, and reactive to light. Neck:      Musculoskeletal: Normal range of motion and neck supple. Trachea: No tracheal deviation. Cardiovascular:      Rate and Rhythm: Normal rate and regular rhythm. Heart sounds: Normal heart sounds. No murmur. No friction rub. No gallop. Pulmonary:      Effort: Pulmonary effort is normal. No tachypnea, accessory muscle usage or respiratory distress. Breath sounds: Normal breath sounds. No stridor. No decreased breath sounds, wheezing, rhonchi or rales. Abdominal:      Palpations: Abdomen is soft. Musculoskeletal: Normal range of motion. General: No tenderness or deformity. Skin:     General: Skin is warm and dry. Coloration: Skin is not pale. Findings: No erythema or rash.    Neurological:      Mental Status: She is alert and oriented to person, place, and time.      GCS: GCS eye subscore is 4. GCS verbal subscore is 5. GCS motor subscore is 6. Gait: Gait normal.   Psychiatric:         Speech: Speech normal.         Behavior: Behavior normal.         Thought Content: Thought content normal.         Judgment: Judgment normal.           Assessment:         1. Essential hypertension    2. Hypokalemia    3. Kidney stone    4. Hospital discharge follow-up    5. S/P laparoscopic sleeve gastrectomy        Plan:     1. Essential hypertension    BP controlled, continue meds  Labs up to date and reviewed  Continue with weight loss     2. Hypokalemia    - potassium chloride (KLOR-CON M) 20 MEQ extended release tablet; Take 2 tablets by mouth daily  Dispense: 120 tablet; Refill: 0  - Basic Metabolic Panel; Future    Recent BMP reviewed   Decrease potassium to qd  Re-check labs  Plan to continue titration down if tolerated   Continue off diuretic     3. Kidney stone    - [facility] - Troy Chang MD, Urology, [facility]    4. Hospital discharge follow-up    Records / results reviewed     5. S/p laparoscopic sleeve gastrectomy     Upcoming follow-up with surgeon     Discussed use, benefit, and side effects of prescribed medications. All patient questions answered. Pt voiced understanding. Reviewed health maintenance. Instructed to continue current medications, diet and exercise. Patient agreedwith treatment plan. Follow up as directed.      Electronically signed by LUZ Govea CNP on 4/29/2021

## 2021-05-13 ENCOUNTER — HOSPITAL ENCOUNTER (OUTPATIENT)
Age: 41
Discharge: HOME | End: 2021-05-13
Payer: COMMERCIAL

## 2021-05-13 ENCOUNTER — OFFICE VISIT (OUTPATIENT)
Dept: BARIATRICS/WEIGHT MGMT | Age: 41
End: 2021-05-13

## 2021-05-13 ENCOUNTER — HOSPITAL ENCOUNTER (OUTPATIENT)
Age: 41
End: 2021-05-13
Payer: COMMERCIAL

## 2021-05-13 ENCOUNTER — HOSPITAL ENCOUNTER (OUTPATIENT)
Dept: GENERAL RADIOLOGY | Age: 41
Discharge: HOME | End: 2021-05-15
Payer: COMMERCIAL

## 2021-05-13 VITALS
DIASTOLIC BLOOD PRESSURE: 80 MMHG | HEIGHT: 64 IN | SYSTOLIC BLOOD PRESSURE: 120 MMHG | BODY MASS INDEX: 35.68 KG/M2 | WEIGHT: 209 LBS | HEART RATE: 94 BPM

## 2021-05-13 DIAGNOSIS — Z98.84 STATUS POST LAPAROSCOPIC SLEEVE GASTRECTOMY: Primary | ICD-10-CM

## 2021-05-13 DIAGNOSIS — N20.0 KIDNEY STONE: ICD-10-CM

## 2021-05-13 DIAGNOSIS — E87.6 HYPOKALEMIA: ICD-10-CM

## 2021-05-13 DIAGNOSIS — E87.6 HYPOKALEMIA: Primary | ICD-10-CM

## 2021-05-13 LAB
ANION GAP SERPL CALCULATED.3IONS-SCNC: 11 MMOL/L (ref 9–17)
BUN BLDV-MCNC: 10 MG/DL (ref 6–20)
BUN/CREAT BLD: ABNORMAL (ref 9–20)
CALCIUM SERPL-MCNC: 9.5 MG/DL (ref 8.6–10.4)
CHLORIDE BLD-SCNC: 101 MMOL/L (ref 98–107)
CO2: 27 MMOL/L (ref 20–31)
CREAT SERPL-MCNC: 0.76 MG/DL (ref 0.5–0.9)
GFR AFRICAN AMERICAN: >60 ML/MIN
GFR NON-AFRICAN AMERICAN: >60 ML/MIN
GFR SERPL CREATININE-BSD FRML MDRD: ABNORMAL ML/MIN/{1.73_M2}
GFR SERPL CREATININE-BSD FRML MDRD: ABNORMAL ML/MIN/{1.73_M2}
GLUCOSE BLD-MCNC: 90 MG/DL (ref 70–99)
POTASSIUM SERPL-SCNC: 3.4 MMOL/L (ref 3.7–5.3)
SODIUM BLD-SCNC: 139 MMOL/L (ref 135–144)

## 2021-05-13 PROCEDURE — 80048 BASIC METABOLIC PNL TOTAL CA: CPT

## 2021-05-13 PROCEDURE — 99024 POSTOP FOLLOW-UP VISIT: CPT | Performed by: SURGERY

## 2021-05-13 PROCEDURE — 74018 RADEX ABDOMEN 1 VIEW: CPT

## 2021-05-13 PROCEDURE — 36415 COLL VENOUS BLD VENIPUNCTURE: CPT

## 2021-05-13 NOTE — PROGRESS NOTES
Medical Nutrition Therapy  Metabolic and Bariatric surgery  1 month after surgery follow up note         Pt reports:         Vitals:   Vitals:    05/13/21 0930   BP: 120/80   Site: Right Upper Arm   Position: Sitting   Cuff Size: Large Adult   Pulse: 94   Weight: 209 lb (94.8 kg)   Height: 5' 4\" (1.626 m)      Body mass index is 35.87 kg/m². Labs reviewed:     Multivitamin/mineral intake:1  Calcium intake:2   Other:            Nutrition Assessment:   PES: Inadequate food and beverage intake r/t WLS as evidenced by loss of excess body weight 20.       Goals   60-80gm of protein  48-64oz of fluid     [x] met     []  Not met        Plan:  Questions answered re diet advancement and tolerance  F/u 3 months after surgery         Fidencio Antunez

## 2021-05-13 NOTE — PROGRESS NOTES
Back pain   [] Knee Pain   [x] Muscle weakness [x] Hernia   [] Edema [] Other:     Positive for: [] Joint pain   [] Back pain   [] Knee Pain   [] Muscle weakness [] Hernia   [] Edema [] Other:    Neurologic Negative for: [x] Syncope   [x] Insomnia   [] Being treated for depression  [] Other:     Positive for: [] Syncope   [] Insomnia   [] Being treated for depression  [] Other:    Skin Negative for: [x] Wound:   [] Open   [] Draining   [] Incisional     [x] Rash   [] Hair Loss  [] Other:     Positive for: [] Wound:   [] Open   [] Draining    [] Incisional  [] Rash   [] Hair Loss  [] Other:          Physical Assessment:     /80 (Site: Right Upper Arm, Position: Sitting, Cuff Size: Large Adult)   Pulse 94   Ht 5' 4\" (1.626 m)   Wt 209 lb (94.8 kg)   BMI 35.87 kg/m²   General Negative for:  [x] Lapses in memory   [x] Unusual Stress   [] Diffic Concen [] Unable to sleep   [] Eating in response to stress   [] Other:    Positive for:  [] Lapses in memory   [] Unusual Stress   [] Diffic Concen [] Unable to sleep   [] Eating in response to stress   [] Other:   Cardio-Pulmonary   [x] Heart RRR   [x] No murmurs   [] Pulses nl x4 extrem    [x] Good inspiratory effort   [x] No wheezing     [x] Lungs clear to auscultation bilaterally    [] Other:    Gastro-Intestinal   [x] Abd S/NT/ND/Benign   [x] No abdominal mass/hernia    [x] No Splenomegaly    [] Other:    Muskuloskeletal   [x] Good muscle strength x4 extremities   [x] nl gait and ambul    [x] Nl ROM x4 extremities    [] Other:    Neurologic   [x] Alert and oriented x3    [] Other:   Skin   [x] Intact w/ no open wounds   [x] Incisions C/D/I    [] Steri strips removed   [x] No drainage or Infection    [] Other:      Assessment & Plan:      1.  Status post laparoscopic sleeve gastrectomy              [x] Advance Diet    [x] Daily protein (65-75gm/day)   [x] Take Vitamins   [x] Attend Support Group    [x] Exercise Regularly     [x] Use contraception:    [] NA    [] s/p Hysterectomy   [] s/p Tubal ligation   [] Other:     Regular bariatric diet    Dietician visit today    Ambulation    Overall doing well    exercise    Follow up: No follow-ups on file. Orders placed this encounter:   No orders of the defined types were placed in this encounter. New Prescriptions:   No orders of the defined types were placed in this encounter. Electronically signed by Karma Ferrara DO on 5/13/2021 at 10:05 AM    Please note that this chart was generated using voice recognition Dragon dictation software. Although every effort was made to ensure the accuracy of this automated transcription, some errors in transcription may have occurred.

## 2021-05-15 PROBLEM — E86.0 DEHYDRATION: Status: RESOLVED | Noted: 2021-04-15 | Resolved: 2021-05-15

## 2021-06-23 ENCOUNTER — OFFICE VISIT (OUTPATIENT)
Dept: FAMILY MEDICINE CLINIC | Age: 41
End: 2021-06-23
Payer: COMMERCIAL

## 2021-06-23 VITALS
OXYGEN SATURATION: 99 % | SYSTOLIC BLOOD PRESSURE: 142 MMHG | HEART RATE: 75 BPM | TEMPERATURE: 97.5 F | BODY MASS INDEX: 34.12 KG/M2 | WEIGHT: 198.8 LBS | DIASTOLIC BLOOD PRESSURE: 93 MMHG

## 2021-06-23 DIAGNOSIS — E87.6 HYPOKALEMIA: ICD-10-CM

## 2021-06-23 DIAGNOSIS — I10 ESSENTIAL HYPERTENSION: Primary | ICD-10-CM

## 2021-06-23 PROCEDURE — G8427 DOCREV CUR MEDS BY ELIG CLIN: HCPCS | Performed by: NURSE PRACTITIONER

## 2021-06-23 PROCEDURE — 99214 OFFICE O/P EST MOD 30 MIN: CPT | Performed by: NURSE PRACTITIONER

## 2021-06-23 PROCEDURE — 1036F TOBACCO NON-USER: CPT | Performed by: NURSE PRACTITIONER

## 2021-06-23 PROCEDURE — G8417 CALC BMI ABV UP PARAM F/U: HCPCS | Performed by: NURSE PRACTITIONER

## 2021-06-23 RX ORDER — AMLODIPINE BESYLATE 10 MG/1
10 TABLET ORAL DAILY
Qty: 30 TABLET | Refills: 5 | Status: SHIPPED | OUTPATIENT
Start: 2021-06-23 | End: 2021-12-28

## 2021-06-23 SDOH — ECONOMIC STABILITY: FOOD INSECURITY: WITHIN THE PAST 12 MONTHS, YOU WORRIED THAT YOUR FOOD WOULD RUN OUT BEFORE YOU GOT MONEY TO BUY MORE.: NEVER TRUE

## 2021-06-23 SDOH — ECONOMIC STABILITY: FOOD INSECURITY: WITHIN THE PAST 12 MONTHS, THE FOOD YOU BOUGHT JUST DIDN'T LAST AND YOU DIDN'T HAVE MONEY TO GET MORE.: NEVER TRUE

## 2021-06-23 ASSESSMENT — PATIENT HEALTH QUESTIONNAIRE - PHQ9
SUM OF ALL RESPONSES TO PHQ QUESTIONS 1-9: 0
SUM OF ALL RESPONSES TO PHQ9 QUESTIONS 1 & 2: 0
SUM OF ALL RESPONSES TO PHQ QUESTIONS 1-9: 0
SUM OF ALL RESPONSES TO PHQ QUESTIONS 1-9: 0
2. FEELING DOWN, DEPRESSED OR HOPELESS: 0
1. LITTLE INTEREST OR PLEASURE IN DOING THINGS: 0

## 2021-06-23 ASSESSMENT — ENCOUNTER SYMPTOMS
GASTROINTESTINAL NEGATIVE: 1
SHORTNESS OF BREATH: 0
DIARRHEA: 0
RESPIRATORY NEGATIVE: 1
CHEST TIGHTNESS: 0
NAUSEA: 0
ALLERGIC/IMMUNOLOGIC NEGATIVE: 1
EYES NEGATIVE: 1
WHEEZING: 0
COUGH: 0
COLOR CHANGE: 0
VOMITING: 0

## 2021-06-23 ASSESSMENT — SOCIAL DETERMINANTS OF HEALTH (SDOH): HOW HARD IS IT FOR YOU TO PAY FOR THE VERY BASICS LIKE FOOD, HOUSING, MEDICAL CARE, AND HEATING?: NOT HARD AT ALL

## 2021-06-23 NOTE — PROGRESS NOTES
[facility]  [address] [facility]  Dept: [phone]    Bird Gaffney is a 41 y.o. female who presents today for her medical conditions/complaintsas noted below. Bird Gaffney is here today c/o Hypertension    Past Medical History:   Diagnosis Date    GERD (gastroesophageal reflux disease)     Hyperlipidemia     not on rx    Hypertension     managed per pcp    Major depressive disorder, recurrent episode, mild ([facility] 10/21/2015    Palmira Hollingsworth    Obesity, Class III, BMI 40-49.9 (morbid obesity) ([facility]     TRISHA (obstructive sleep apnea)     Saw Dr. Jeanine Cruz for clearance for surgery    Pre-diabetes     diet control    Presence of tooth-root and mandibular implants     all top teeth    Tachycardia     per pt.  Saw Dr. Esequiel Mcclelland 2020    Wears contact lenses     Instructed not to wear contacts for surgery      Past Surgical History:   Procedure Laterality Date     SECTION      x1    SLEEVE GASTRECTOMY  2021    LAPAROSCOPIC ROBOTIC GASTRECTOMY SLEEVE, HIATAL HERNIA REPAIR, EGD     SLEEVE GASTRECTOMY N/A 2021    XI LAPAROSCOPIC ROBOTIC GASTRECTOMY SLEEVE, HIATAL HERNIA REPAIR, EGD - GI UNIT SCHEDULED. performed by Faraz Melgar DO at [address]  ENDOSCOPY N/A 2021    EGD BIOPSY performed by Faraz Melgar DO at [facility]       Family History   Problem Relation Age of Onset    Hypertension Mother     Asthma Mother     Hypertension Maternal Aunt     Asthma Maternal Uncle     Hypertension Maternal Uncle     Arthritis Maternal Grandmother     Hypertension Maternal Grandmother     Stroke Maternal Grandmother     Breast Cancer Neg Hx     Cancer Neg Hx     Colon Cancer Neg Hx     Diabetes Neg Hx     Eclampsia Neg Hx     Ovarian Cancer Neg Hx      Labor Neg Hx     Spont Abortions Neg Hx        Social History     Tobacco Use    Smoking status: Never Smoker    Smokeless tobacco: Never Used   Substance Use Topics    Alcohol use: No     Alcohol/week: 0.0 standard drinks      Current Outpatient Medications   Medication Sig Dispense Refill    potassium chloride (KLOR-CON M) 20 MEQ extended release tablet Take 2 tablets by mouth daily 120 tablet 0    Multiple Vitamins-Minerals (MULTIVITAMIN WITH MINERALS) tablet Take 2 tablets by mouth daily 30 tablet 0    famotidine (PEPCID) 20 MG tablet Take 1 tablet by mouth 2 times daily 60 tablet 3    montelukast (SINGULAIR) 10 MG tablet TAKE 1 TABLET BY MOUTH DAILY 30 tablet 5    amLODIPine (NORVASC) 5 MG tablet Take 1 tablet by mouth daily 30 tablet 11    Metoprolol Succinate 25 MG CS24 Take by mouth 1/2 tab daily per Dr. Greta Castillo       No current facility-administered medications for this visit. No Known Allergies      HPI:     HPI    Presents today c/o follow-up HTN   Taking amlodipine, metoprolol   Chlorthalidone & losartan discontinued by bariatric surgeon s/p surgery   Continues on K supplement for hypokalemia (40 qd)   Not monitoring blood pressure at home   Continues to lose weight s/p bariatric surgery   Diet has been good overall   Declines related chest pain, palpitations, dizziness, headaches, edema, syncope etc.     H/o hypokalemia - K     Health Maintenance:      Subjective:     Review of Systems   Constitutional: Negative. Negative for appetite change, chills, diaphoresis, fatigue, fever and unexpected weight change. HENT: Negative. Eyes: Negative. Respiratory: Negative. Negative for cough, chest tightness, shortness of breath and wheezing. Cardiovascular: Negative. Negative for chest pain and leg swelling. Gastrointestinal: Negative. Negative for diarrhea, nausea and vomiting. Endocrine: Negative. Genitourinary: Negative. Negative for difficulty urinating and dysuria. Musculoskeletal: Negative. Skin: Negative. Negative for color change, pallor, rash and wound. Allergic/Immunologic: Negative.

## 2021-07-14 ENCOUNTER — HOSPITAL ENCOUNTER (OUTPATIENT)
Age: 41
Discharge: HOME | End: 2021-07-14
Payer: COMMERCIAL

## 2021-07-14 ENCOUNTER — HOSPITAL ENCOUNTER (OUTPATIENT)
Dept: GENERAL RADIOLOGY | Age: 41
Discharge: HOME | End: 2021-07-16
Payer: COMMERCIAL

## 2021-07-14 ENCOUNTER — HOSPITAL ENCOUNTER (OUTPATIENT)
Age: 41
Discharge: HOME | End: 2021-07-16
Payer: COMMERCIAL

## 2021-07-14 DIAGNOSIS — E87.6 HYPOKALEMIA: ICD-10-CM

## 2021-07-14 DIAGNOSIS — N20.0 KIDNEY STONE: ICD-10-CM

## 2021-07-14 LAB — POTASSIUM SERPL-SCNC: 4 MMOL/L (ref 3.7–5.3)

## 2021-07-14 PROCEDURE — 84132 ASSAY OF SERUM POTASSIUM: CPT

## 2021-07-14 PROCEDURE — 74018 RADEX ABDOMEN 1 VIEW: CPT

## 2021-07-14 PROCEDURE — 36415 COLL VENOUS BLD VENIPUNCTURE: CPT

## 2021-08-24 ENCOUNTER — OFFICE VISIT (OUTPATIENT)
Dept: BARIATRICS/WEIGHT MGMT | Age: 41
End: 2021-08-24
Payer: COMMERCIAL

## 2021-08-24 VITALS
WEIGHT: 185 LBS | HEART RATE: 94 BPM | HEIGHT: 64 IN | SYSTOLIC BLOOD PRESSURE: 136 MMHG | BODY MASS INDEX: 31.58 KG/M2 | DIASTOLIC BLOOD PRESSURE: 88 MMHG

## 2021-08-24 DIAGNOSIS — E78.5 HYPERLIPIDEMIA, UNSPECIFIED HYPERLIPIDEMIA TYPE: ICD-10-CM

## 2021-08-24 DIAGNOSIS — E66.9 OBESITY (BMI 30-39.9): ICD-10-CM

## 2021-08-24 DIAGNOSIS — R73.03 PREDIABETES: ICD-10-CM

## 2021-08-24 DIAGNOSIS — Z98.84 S/P LAPAROSCOPIC SLEEVE GASTRECTOMY: ICD-10-CM

## 2021-08-24 DIAGNOSIS — I10 ESSENTIAL HYPERTENSION: Primary | ICD-10-CM

## 2021-08-24 PROBLEM — G47.33 OSA (OBSTRUCTIVE SLEEP APNEA): Status: RESOLVED | Noted: 2020-08-28 | Resolved: 2021-08-24

## 2021-08-24 PROBLEM — K21.9 HIATAL HERNIA WITH GASTROESOPHAGEAL REFLUX: Status: RESOLVED | Noted: 2020-07-27 | Resolved: 2021-08-24

## 2021-08-24 PROBLEM — K44.9 HIATAL HERNIA WITH GASTROESOPHAGEAL REFLUX: Status: RESOLVED | Noted: 2020-07-27 | Resolved: 2021-08-24

## 2021-08-24 PROBLEM — E66.01 MORBID OBESITY WITH BMI OF 40.0-44.9, ADULT (HCC): Status: RESOLVED | Noted: 2020-10-15 | Resolved: 2021-08-24

## 2021-08-24 PROCEDURE — G8417 CALC BMI ABV UP PARAM F/U: HCPCS | Performed by: NURSE PRACTITIONER

## 2021-08-24 PROCEDURE — G8427 DOCREV CUR MEDS BY ELIG CLIN: HCPCS | Performed by: NURSE PRACTITIONER

## 2021-08-24 PROCEDURE — 99213 OFFICE O/P EST LOW 20 MIN: CPT | Performed by: NURSE PRACTITIONER

## 2021-08-24 PROCEDURE — 1036F TOBACCO NON-USER: CPT | Performed by: NURSE PRACTITIONER

## 2021-08-24 RX ORDER — TAMSULOSIN HYDROCHLORIDE 0.4 MG/1
CAPSULE ORAL
COMMUNITY
Start: 2021-06-14 | End: 2022-02-10

## 2021-08-24 NOTE — PROGRESS NOTES
Post-op Bariatric Surgery Note    Subjective     Patient is 3 months s/p laparoscopic sleeve gastrectomy and hiatal hernia repair, down 44 lbs. Overall, doing well. Incisions well healed. Consistent use of MVI and calcium. Physical activity includes walking. No pain or other specific problems or concerns. Allergies:  No Known Allergies    Past Medical History:     Past Medical History:   Diagnosis Date    GERD (gastroesophageal reflux disease)     Hyperlipidemia     not on rx    Hypertension     managed per pcp    Major depressive disorder, recurrent episode, mild ([facility] [address] 10/21/2015    [place]mira Hollingsworth    Obesity, Class III, BMI 40-49.9 (morbid obesity) ([facility] [address]     TRISHA (obstructive sleep apnea)     Saw Dr. Sandra Thompson for clearance for surgery    Pre-diabetes     diet control    Presence of tooth-root and mandibular implants     all top teeth    Tachycardia     per pt. Saw Dr. Jaxson Perez 2020    Wears contact lenses     Instructed not to wear contacts for surgery   .     Past Surgical History:  Past Surgical History:   Procedure Laterality Date     SECTION      x1    SLEEVE GASTRECTOMY  2021    LAPAROSCOPIC ROBOTIC GASTRECTOMY SLEEVE, HIATAL HERNIA REPAIR, EGD     SLEEVE GASTRECTOMY N/A 2021    XI LAPAROSCOPIC ROBOTIC GASTRECTOMY SLEEVE, HIATAL HERNIA REPAIR, EGD - GI UNIT SCHEDULED. performed by Ender Tejeda DO at [address] [place] [address]  ENDOSCOPY N/A 2021    EGD BIOPSY performed by Ender Tejeda DO at [facility]       Family History:  Family History   Problem Relation Age of Onset    Hypertension Mother     Asthma Mother     Hypertension Maternal Aunt     Asthma Maternal Uncle     Hypertension Maternal Uncle     Arthritis Maternal Grandmother     Hypertension Maternal Grandmother     Stroke Maternal Grandmother     Breast Cancer Neg Hx     Cancer Neg Hx     Colon Cancer Neg Hx     Diabetes Neg Hx     Eclampsia Neg Hx     Ovarian Cancer Neg Hx      Labor Neg Hx     Spont Abortions Neg Hx        Social History:  Social History     Socioeconomic History    Marital status: Single     Spouse name: Not on file    Number of children: Not on file    Years of education: Not on file    Highest education level: Not on file   Occupational History    Not on file   Tobacco Use    Smoking status: Never Smoker    Smokeless tobacco: Never Used   Vaping Use    Vaping Use: Never used   Substance and Sexual Activity    Alcohol use: No     Alcohol/week: 0.0 standard drinks    Drug use: No    Sexual activity: Not Currently     Partners: Male   Other Topics Concern    Not on file   Social History Narrative    Not on file     Social Determinants of Health     Financial Resource Strain: Low Risk     Difficulty of Paying Living Expenses: Not hard at all   Food Insecurity: No Food Insecurity    Worried About [address] in the Last Year: Never true    [address] in the Last Year: Never true   Transportation Needs:     Lack of Transportation (Medical):      Lack of Transportation (Non-Medical):    Physical Activity:     Days of Exercise per Week:     Minutes of Exercise per Session:    Stress:     Feeling of Stress :    Social Connections:     Frequency of Communication with Friends and Family:     Frequency of Social Gatherings with Friends and Family:     Attends Christianity Services:     Active Member of Clubs or Organizations:     Attends Club or Organization Meetings:     Marital Status:    Intimate Partner Violence:     Fear of Current or Ex-Partner:     Emotionally Abused:     Physically Abused:     Sexually Abused:        Current Medications:  Current Outpatient Medications   Medication Sig Dispense Refill    tamsulosin (FLOMAX) 0.4 MG capsule       amLODIPine (NORVASC) 10 MG tablet Take 1 tablet by mouth daily 30 tablet 5    potassium chloride (KLOR-CON M) 20 MEQ extended release tablet Take 2 tablets by mouth daily 120 tablet 0    Multiple Vitamins-Minerals (MULTIVITAMIN WITH MINERALS) tablet Take 2 tablets by mouth daily 30 tablet 0    famotidine (PEPCID) 20 MG tablet Take 1 tablet by mouth 2 times daily 60 tablet 3    montelukast (SINGULAIR) 10 MG tablet TAKE 1 TABLET BY MOUTH DAILY 30 tablet 5    Metoprolol Succinate 25 MG CS24 Take by mouth 1/2 tab daily per Dr. Silvia Kasper       No current facility-administered medications for this visit. Vital Signs:  /88 (Site: Right Upper Arm, Position: Sitting, Cuff Size: Large Adult)   Pulse 94   Ht 5' 4\" (1.626 m)   Wt 185 lb (83.9 kg)   BMI 31.76 kg/m²     BMI/Height/Weight:  Body mass index is 31.76 kg/m². Review of Systems - A review of systems was performed. All was negative unless otherwise documented in HPI. Constitutional: Negative for fever, chills and diaphoresis. HENT: Negative for hearing loss and trouble swallowing. Eyes: Negative for photophobia and visual disturbance. Respiratory: Negative for cough, shortness of breath and wheezing. Cardiovascular: Negative for chest pain and palpitations. Gastrointestinal: Negative for nausea, vomiting, abdominal pain, diarrhea, constipation, blood in stool and abdominal distention. Endocrine: Negative for polydipsia, polyphagia and polyuria. Genitourinary: Negative for dysuria, frequency, hematuria and difficulty urinating. Musculoskeletal: Negative for myalgias, joint swelling. Skin: Negative for pallor and rash. Neurological: Negative for dizziness, tremors, light-headedness and headaches. Psychiatric/Behavioral: Negative for sleep disturbance and dysphoric mood. Objective:      Physical Exam   Vital signs reviewed. General: Well-developed and well-nourished. No acute distress. Skin: Warm, dry and intact. HEENT: Normocephalic. EOMs intact. Conjunctivae normal. Neck supple. Cardiovascular: Normal rate, regular rhythm. Pulmonary/Chest: Normal effort. Lungs clear to auscultation. No rales, rhonchi or wheezing. Abdominal: Positive bowel sounds. Soft, nontender. Nondistended. No rigidity, rebound, or guarding. Musculoskeletal: Movement x4. No edema. Neurological: Gait normal. Alert and oriented to person, place, and time. Psychiatric: Normal mood and affect. Speech and behavior normal. Judgment and thought content normal. Cognition and memory intact. Assessment:       Diagnosis Orders   1. Essential hypertension  Comprehensive Metabolic Panel    TSH without Reflex    T4, Free    Hemoglobin A1C    Vitamin B12 & Folate    Vitamin B1    Vitamin D 25 Hydroxy    Magnesium    Iron and TIBC    Vitamin A    Lipid Panel    PTH, Intact    CBC Auto Differential    Zinc    Ferritin   2. Hyperlipidemia, unspecified hyperlipidemia type  Comprehensive Metabolic Panel    TSH without Reflex    T4, Free    Hemoglobin A1C    Vitamin B12 & Folate    Vitamin B1    Vitamin D 25 Hydroxy    Magnesium    Iron and TIBC    Vitamin A    Lipid Panel    PTH, Intact    CBC Auto Differential    Zinc    Ferritin   3. Obesity (BMI 30-39. 9)  Comprehensive Metabolic Panel    TSH without Reflex    T4, Free    Hemoglobin A1C    Vitamin B12 & Folate    Vitamin B1    Vitamin D 25 Hydroxy    Magnesium    Iron and TIBC    Vitamin A    Lipid Panel    PTH, Intact    CBC Auto Differential    Zinc    Ferritin   4. Prediabetes  Comprehensive Metabolic Panel    TSH without Reflex    T4, Free    Hemoglobin A1C    Vitamin B12 & Folate    Vitamin B1    Vitamin D 25 Hydroxy    Magnesium    Iron and TIBC    Vitamin A    Lipid Panel    PTH, Intact    CBC Auto Differential    Zinc    Ferritin   5.  S/P laparoscopic sleeve gastrectomy  Comprehensive Metabolic Panel    TSH without Reflex    T4, Free    Hemoglobin A1C    Vitamin B12 & Folate    Vitamin B1    Vitamin D 25 Hydroxy    Magnesium    Iron and TIBC    Vitamin A    Lipid Panel    PTH, Intact    CBC Auto Differential    Zinc    Ferritin       Plan: Dietitian visit today. Patient to continue to increase protein to obtain 60-80g/day, at least 48-64oz of fluid daily, and gradually increase exercise regimen. Follow-up  Return in about 3 months (around 11/24/2021). Orders this encounter:  Orders Placed This Encounter   Procedures    Comprehensive Metabolic Panel     Standing Status:   Future     Standing Expiration Date:   8/24/2022    TSH without Reflex     Standing Status:   Future     Standing Expiration Date:   8/24/2022    T4, Free     Standing Status:   Future     Standing Expiration Date:   8/24/2022    Hemoglobin A1C     Standing Status:   Future     Standing Expiration Date:   8/24/2022    Vitamin B12 & Folate     Standing Status:   Future     Standing Expiration Date:   8/24/2022    Vitamin B1     Standing Status:   Future     Standing Expiration Date:   8/24/2022    Vitamin D 25 Hydroxy     Standing Status:   Future     Standing Expiration Date:   8/24/2022    Magnesium     Standing Status:   Future     Standing Expiration Date:   8/24/2022    Iron and TIBC     Standing Status:   Future     Standing Expiration Date:   8/24/2022     Order Specific Question:   Is Patient Fasting? Answer:   Yes     Order Specific Question:   No of Hours?      Answer:   12 hours    Vitamin A     Standing Status:   Future     Standing Expiration Date:   8/24/2022    Lipid Panel     Standing Status:   Future     Standing Expiration Date:   8/24/2022     Order Specific Question:   Is Patient Fasting?/# of Hours     Answer:   12 hrs    PTH, Intact     Standing Status:   Future     Standing Expiration Date:   8/24/2022    CBC Auto Differential     Standing Status:   Future     Standing Expiration Date:   8/24/2022    Zinc     Standing Status:   Future     Standing Expiration Date:   8/25/2022    Ferritin     Standing Status:   Future     Standing Expiration Date:   8/25/2022       Prescriptions this encounter:  No orders of the defined types were placed in this encounter.       Electronically signed by:  Lindsey Fry CNP

## 2021-08-25 NOTE — PROGRESS NOTES
Medical Nutrition Therapy  Metabolic and Bariatric surgery  3 month follow up        Pt reports:         Vitals:   Vitals:    08/24/21 1621   BP: 136/88   Site: Right Upper Arm   Position: Sitting   Cuff Size: Large Adult   Pulse: 94   Weight: 185 lb (83.9 kg)   Height: 5' 4\" (1.626 m)      Body mass index is 31.76 kg/m². Labs reviewed:     Multivitamin/mineral intake:one daily  Calcium intake:   2 daily  Other:            Nutrition Assessment:   PES: Inadequate food and beverage intake r/t WLS as evidenced by loss of excess body weight 44lb. Goals   60-80gm of protein  48-64oz of fluid  Vitamin adherance  Basic adherance to WLS behavious and this document has been scanned into the chart.        [x] met     []  Not met        Plan:   F/u 6 months after surgery         Roly Herrera, RD, LD

## 2021-09-17 DIAGNOSIS — R94.2 ABNORMAL PFTS (PULMONARY FUNCTION TESTS): ICD-10-CM

## 2021-09-17 DIAGNOSIS — E87.6 HYPOKALEMIA: ICD-10-CM

## 2021-09-17 DIAGNOSIS — J30.9 ALLERGIC RHINITIS, UNSPECIFIED SEASONALITY, UNSPECIFIED TRIGGER: ICD-10-CM

## 2021-09-17 RX ORDER — MONTELUKAST SODIUM 10 MG/1
10 TABLET ORAL DAILY
Qty: 30 TABLET | Refills: 5 | Status: SHIPPED | OUTPATIENT
Start: 2021-09-17 | End: 2022-03-11

## 2021-09-17 RX ORDER — POTASSIUM CHLORIDE 20 MEQ/1
TABLET, EXTENDED RELEASE ORAL
Qty: 60 TABLET | Refills: 5 | Status: SHIPPED | OUTPATIENT
Start: 2021-09-17 | End: 2022-03-11

## 2021-09-17 NOTE — TELEPHONE ENCOUNTER
Vishnu Banegas is calling to request a refill on the following medication(s):    Medication Request:  Requested Prescriptions     Pending Prescriptions Disp Refills    potassium chloride (KLOR-CON M) 20 MEQ extended release tablet [Pharmacy Med Name: POTASSIUM CHL ER 20MEQ TAB 20 Tablet] 60 tablet 5     Sig: TAKE 2 TABLETS BY MOUTH ONCE DAILY    montelukast (SINGULAIR) 10 MG tablet [Pharmacy Med Name: MONTELUKAST 10 MG TAB 10 Tablet] 30 tablet 5     Sig: TAKE 1 TABLET BY MOUTH DAILY       Last Visit Date (If Applicable):  4/99/8843    Next Visit Date:    Visit date not found

## 2021-12-28 ENCOUNTER — HOSPITAL ENCOUNTER (OUTPATIENT)
Age: 41
Setting detail: SPECIMEN
Discharge: HOME | End: 2021-12-28
Payer: COMMERCIAL

## 2021-12-28 ENCOUNTER — OFFICE VISIT (OUTPATIENT)
Dept: BARIATRICS/WEIGHT MGMT | Age: 41
End: 2021-12-28
Payer: COMMERCIAL

## 2021-12-28 VITALS
DIASTOLIC BLOOD PRESSURE: 96 MMHG | HEART RATE: 93 BPM | BODY MASS INDEX: 27.14 KG/M2 | HEIGHT: 64 IN | SYSTOLIC BLOOD PRESSURE: 151 MMHG | WEIGHT: 159 LBS

## 2021-12-28 DIAGNOSIS — Z98.84 S/P LAPAROSCOPIC SLEEVE GASTRECTOMY: ICD-10-CM

## 2021-12-28 DIAGNOSIS — E78.5 HYPERLIPIDEMIA, UNSPECIFIED HYPERLIPIDEMIA TYPE: ICD-10-CM

## 2021-12-28 DIAGNOSIS — I10 ESSENTIAL HYPERTENSION: Primary | ICD-10-CM

## 2021-12-28 DIAGNOSIS — E66.3 OVERWEIGHT (BMI 25.0-29.9): ICD-10-CM

## 2021-12-28 DIAGNOSIS — E66.9 OBESITY (BMI 30-39.9): ICD-10-CM

## 2021-12-28 DIAGNOSIS — R73.03 PREDIABETES: ICD-10-CM

## 2021-12-28 DIAGNOSIS — I10 ESSENTIAL HYPERTENSION: ICD-10-CM

## 2021-12-28 LAB
ABSOLUTE EOS #: 0.08 K/UL (ref 0–0.44)
ABSOLUTE IMMATURE GRANULOCYTE: <0.03 K/UL (ref 0–0.3)
ABSOLUTE LYMPH #: 2.29 K/UL (ref 1.1–3.7)
ABSOLUTE MONO #: 0.53 K/UL (ref 0.1–1.2)
ALBUMIN SERPL-MCNC: 4.1 G/DL (ref 3.5–5.2)
ALBUMIN/GLOBULIN RATIO: 1.2 (ref 1–2.5)
ALP BLD-CCNC: 57 U/L (ref 35–104)
ALT SERPL-CCNC: 8 U/L (ref 5–33)
ANION GAP SERPL CALCULATED.3IONS-SCNC: 10 MMOL/L (ref 9–17)
AST SERPL-CCNC: 14 U/L
BASOPHILS # BLD: 1 % (ref 0–2)
BASOPHILS ABSOLUTE: 0.04 K/UL (ref 0–0.2)
BILIRUB SERPL-MCNC: 0.18 MG/DL (ref 0.3–1.2)
BUN BLDV-MCNC: 13 MG/DL (ref 6–20)
BUN/CREAT BLD: ABNORMAL (ref 9–20)
CALCIUM SERPL-MCNC: 9.4 MG/DL (ref 8.6–10.4)
CHLORIDE BLD-SCNC: 100 MMOL/L (ref 98–107)
CHOLESTEROL/HDL RATIO: 4
CHOLESTEROL: 229 MG/DL
CO2: 27 MMOL/L (ref 20–31)
CREAT SERPL-MCNC: 0.9 MG/DL (ref 0.5–0.9)
DIFFERENTIAL TYPE: ABNORMAL
EOSINOPHILS RELATIVE PERCENT: 1 % (ref 1–4)
FERRITIN: 8 UG/L (ref 13–150)
FOLATE: 14.5 NG/ML
GFR AFRICAN AMERICAN: >60 ML/MIN
GFR NON-AFRICAN AMERICAN: >60 ML/MIN
GFR SERPL CREATININE-BSD FRML MDRD: ABNORMAL ML/MIN/{1.73_M2}
GFR SERPL CREATININE-BSD FRML MDRD: ABNORMAL ML/MIN/{1.73_M2}
GLUCOSE BLD-MCNC: 84 MG/DL (ref 70–99)
HCT VFR BLD CALC: 31.6 % (ref 36.3–47.1)
HDLC SERPL-MCNC: 57 MG/DL
HEMOGLOBIN: 9.4 G/DL (ref 11.9–15.1)
IMMATURE GRANULOCYTES: 0 %
IRON SATURATION: 8 % (ref 20–55)
IRON: 26 UG/DL (ref 37–145)
LDL CHOLESTEROL: 155 MG/DL (ref 0–130)
LYMPHOCYTES # BLD: 36 % (ref 24–43)
MAGNESIUM: 2.1 MG/DL (ref 1.6–2.6)
MCH RBC QN AUTO: 23.9 PG (ref 25.2–33.5)
MCHC RBC AUTO-ENTMCNC: 29.7 G/DL (ref 28.4–34.8)
MCV RBC AUTO: 80.4 FL (ref 82.6–102.9)
MONOCYTES # BLD: 8 % (ref 3–12)
NRBC AUTOMATED: 0 PER 100 WBC
PDW BLD-RTO: 14.8 % (ref 11.8–14.4)
PLATELET # BLD: 514 K/UL (ref 138–453)
PLATELET ESTIMATE: ABNORMAL
PMV BLD AUTO: 9.1 FL (ref 8.1–13.5)
POTASSIUM SERPL-SCNC: 3.5 MMOL/L (ref 3.7–5.3)
PTH INTACT: 75.16 PG/ML (ref 15–65)
RBC # BLD: 3.93 M/UL (ref 3.95–5.11)
RBC # BLD: ABNORMAL 10*6/UL
SEG NEUTROPHILS: 54 % (ref 36–65)
SEGMENTED NEUTROPHILS ABSOLUTE COUNT: 3.45 K/UL (ref 1.5–8.1)
SODIUM BLD-SCNC: 137 MMOL/L (ref 135–144)
THYROXINE, FREE: 1.06 NG/DL (ref 0.93–1.7)
TOTAL IRON BINDING CAPACITY: 345 UG/DL (ref 250–450)
TOTAL PROTEIN: 7.5 G/DL (ref 6.4–8.3)
TRIGL SERPL-MCNC: 86 MG/DL
TSH SERPL DL<=0.05 MIU/L-ACNC: 0.57 MIU/L (ref 0.3–5)
UNSATURATED IRON BINDING CAPACITY: 319 UG/DL (ref 112–347)
VITAMIN B-12: 878 PG/ML (ref 232–1245)
VITAMIN D 25-HYDROXY: 24.4 NG/ML (ref 30–100)
VLDLC SERPL CALC-MCNC: ABNORMAL MG/DL (ref 1–30)
WBC # BLD: 6.4 K/UL (ref 3.5–11.3)
WBC # BLD: ABNORMAL 10*3/UL

## 2021-12-28 PROCEDURE — 82607 VITAMIN B-12: CPT

## 2021-12-28 PROCEDURE — 83970 ASSAY OF PARATHORMONE: CPT

## 2021-12-28 PROCEDURE — 85025 COMPLETE CBC W/AUTO DIFF WBC: CPT

## 2021-12-28 PROCEDURE — 84443 ASSAY THYROID STIM HORMONE: CPT

## 2021-12-28 PROCEDURE — G8427 DOCREV CUR MEDS BY ELIG CLIN: HCPCS | Performed by: NURSE PRACTITIONER

## 2021-12-28 PROCEDURE — 84425 ASSAY OF VITAMIN B-1: CPT

## 2021-12-28 PROCEDURE — 82746 ASSAY OF FOLIC ACID SERUM: CPT

## 2021-12-28 PROCEDURE — 82728 ASSAY OF FERRITIN: CPT

## 2021-12-28 PROCEDURE — G8484 FLU IMMUNIZE NO ADMIN: HCPCS | Performed by: NURSE PRACTITIONER

## 2021-12-28 PROCEDURE — 80061 LIPID PANEL: CPT

## 2021-12-28 PROCEDURE — 84439 ASSAY OF FREE THYROXINE: CPT

## 2021-12-28 PROCEDURE — 83036 HEMOGLOBIN GLYCOSYLATED A1C: CPT

## 2021-12-28 PROCEDURE — 83735 ASSAY OF MAGNESIUM: CPT

## 2021-12-28 PROCEDURE — 83550 IRON BINDING TEST: CPT

## 2021-12-28 PROCEDURE — 83540 ASSAY OF IRON: CPT

## 2021-12-28 PROCEDURE — 82306 VITAMIN D 25 HYDROXY: CPT

## 2021-12-28 PROCEDURE — 84630 ASSAY OF ZINC: CPT

## 2021-12-28 PROCEDURE — 84590 ASSAY OF VITAMIN A: CPT

## 2021-12-28 PROCEDURE — 36415 COLL VENOUS BLD VENIPUNCTURE: CPT

## 2021-12-28 PROCEDURE — 1036F TOBACCO NON-USER: CPT | Performed by: NURSE PRACTITIONER

## 2021-12-28 PROCEDURE — G8417 CALC BMI ABV UP PARAM F/U: HCPCS | Performed by: NURSE PRACTITIONER

## 2021-12-28 PROCEDURE — 99213 OFFICE O/P EST LOW 20 MIN: CPT | Performed by: NURSE PRACTITIONER

## 2021-12-28 PROCEDURE — 80053 COMPREHEN METABOLIC PANEL: CPT

## 2021-12-28 RX ORDER — AMLODIPINE BESYLATE 5 MG/1
TABLET ORAL
COMMUNITY
Start: 2021-12-24 | End: 2022-02-14

## 2021-12-28 RX ORDER — LOSARTAN POTASSIUM 100 MG/1
TABLET ORAL
COMMUNITY
Start: 2021-12-24 | End: 2022-01-07

## 2021-12-28 NOTE — PROGRESS NOTES
Medical Nutrition Therapy  Metabolic and Bariatric surgery  6 month follow up note         Pt reports:         Vitals:   Vitals:    12/28/21 1609 12/28/21 1639   BP: (!) 176/128 (!) 151/96   Site: Left Lower Arm Right Upper Arm   Position: Sitting Sitting   Cuff Size: Medium Adult Large Adult   Pulse: 93    Weight: 159 lb (72.1 kg)    Height: 5' 4\" (1.626 m)       Body mass index is 27.29 kg/m². Labs reviewed:     Multivitamin/mineral intake:one mvi  Calcium intake:2 calcium              Nutrition Assessment:   PES: Inadequate food and beverage intake r/t WLS as evidenced by loss of excess body weight 70lb. Goals   60-80gm of protein  48-64oz of fluid  Vitamin adherance  Basic adherance to WLS behavious and this document has been scanned into the chart.        [x] met     []  Not met        Plan:   F/u 9 months after surgery         Kamila Trejo RD, LD

## 2021-12-28 NOTE — PROGRESS NOTES
Ovarian Cancer Neg Hx      Labor Neg Hx     Spont Abortions Neg Hx        Social History:  Social History     Socioeconomic History    Marital status: Single     Spouse name: Not on file    Number of children: Not on file    Years of education: Not on file    Highest education level: Not on file   Occupational History    Not on file   Tobacco Use    Smoking status: Never Smoker    Smokeless tobacco: Never Used   Vaping Use    Vaping Use: Never used   Substance and Sexual Activity    Alcohol use: No     Alcohol/week: 0.0 standard drinks    Drug use: No    Sexual activity: Not Currently     Partners: Male   Other Topics Concern    Not on file   Social History Narrative    Not on file     Social Determinants of Health     Financial Resource Strain: Low Risk     Difficulty of Paying Living Expenses: Not hard at all   Food Insecurity: No Food Insecurity    Worried About [address] in the Last Year: Never true    [address] in the Last Year: Never true   Transportation Needs:     Lack of Transportation (Medical): Not on file    Lack of Transportation (Non-Medical):  Not on file   Physical Activity:     Days of Exercise per Week: Not on file    Minutes of Exercise per Session: Not on file   Stress:     Feeling of Stress : Not on file   Social Connections:     Frequency of Communication with Friends and Family: Not on file    Frequency of Social Gatherings with Friends and Family: Not on file    Attends [employer] Services: Not on file    Active Member of Clubs or Organizations: Not on file    Attends Club or Organization Meetings: Not on file    Marital Status: Not on file   Intimate Partner Violence:     Fear of Current or Ex-Partner: Not on file    Emotionally Abused: Not on file    Physically Abused: Not on file    Sexually Abused: Not on file   Housing Stability:     Unable to Pay for Housing in the Last Year: Not on file    Number of [place] in the Last Year: Not on file    Unstable Housing in the Last Year: Not on file       Current Medications:  Current Outpatient Medications   Medication Sig Dispense Refill    amLODIPine (NORVASC) 5 MG tablet       losartan (COZAAR) 100 MG tablet TAKE 1 TABLET BY MOUTH EVERY DAY *PATIENT NEEDS APPOINTMENT FOR FURTHER REFILLS*      potassium chloride (KLOR-CON M) 20 MEQ extended release tablet TAKE 2 TABLETS BY MOUTH ONCE DAILY 60 tablet 5    montelukast (SINGULAIR) 10 MG tablet TAKE 1 TABLET BY MOUTH DAILY 30 tablet 5    tamsulosin (FLOMAX) 0.4 MG capsule       Multiple Vitamins-Minerals (MULTIVITAMIN WITH MINERALS) tablet Take 2 tablets by mouth daily 30 tablet 0    famotidine (PEPCID) 20 MG tablet Take 1 tablet by mouth 2 times daily 60 tablet 3    Metoprolol Succinate 25 MG CS24 Take by mouth 1/2 tab daily per Dr. Savita Diane       No current facility-administered medications for this visit. Vital Signs:  BP (!) 151/96 (Site: Right Upper Arm, Position: Sitting, Cuff Size: Large Adult)   Pulse 93   Ht 5' 4\" (1.626 m)   Wt 159 lb (72.1 kg)   BMI 27.29 kg/m²     BMI/Height/Weight:  Body mass index is 27.29 kg/m². Review of Systems - A review of systems was performed. All was negative unless otherwise documented in HPI. Constitutional: Negative for fever, chills and diaphoresis. HENT: Negative for hearing loss and trouble swallowing. Eyes: Negative for photophobia and visual disturbance. Respiratory: Negative for cough, shortness of breath and wheezing. Cardiovascular: Negative for chest pain and palpitations. Gastrointestinal: Negative for nausea, vomiting, abdominal pain, diarrhea, constipation, blood in stool and abdominal distention. Endocrine: Negative for polydipsia, polyphagia and polyuria. Genitourinary: Negative for dysuria, frequency, hematuria and difficulty urinating. Musculoskeletal: Negative for myalgias, joint swelling. Skin: Negative for pallor and rash.    Neurological: Negative for dizziness, tremors, light-headedness and headaches. Psychiatric/Behavioral: Negative for sleep disturbance and dysphoric mood. Objective:      Physical Exam   Vital signs reviewed. General: Well-developed and well-nourished. No acute distress. Skin: Warm, dry and intact. HEENT: Normocephalic. EOMs intact. Conjunctivae normal. Neck supple. Cardiovascular: Normal rate, regular rhythm. Pulmonary/Chest: Normal effort. Lungs clear to auscultation. No rales, rhonchi or wheezing. Abdominal: Positive bowel sounds. Soft, nontender. Nondistended. No rigidity, rebound, or guarding. Musculoskeletal: Movement x4. No edema. Neurological: Gait normal. Alert and oriented to person, place, and time. Psychiatric: Normal mood and affect. Speech and behavior normal. Judgment and thought content normal. Cognition and memory intact. Assessment:       Diagnosis Orders   1. Essential hypertension     2. Hyperlipidemia, unspecified hyperlipidemia type     3. S/P laparoscopic sleeve gastrectomy     4. Overweight (BMI 25.0-29. 9)         Plan:    Dietitian visit today. Patient to continue to increase protein to obtain 60-80g/day, at least 48-64oz of fluid daily, and gradually increase exercise regimen. BP elevated today. Asymptomatic. Did not take her medication today. Follow-up  Return in about 3 months (around 3/28/2022). Orders this encounter:  No orders of the defined types were placed in this encounter. Prescriptions this encounter:  No orders of the defined types were placed in this encounter.       Electronically signed by:  Jory Crowe CNP

## 2021-12-29 DIAGNOSIS — E61.1 LOW IRON: Primary | ICD-10-CM

## 2021-12-29 DIAGNOSIS — E55.9 VITAMIN D DEFICIENCY: ICD-10-CM

## 2021-12-29 LAB
ESTIMATED AVERAGE GLUCOSE: 105 MG/DL
HBA1C MFR BLD: 5.3 % (ref 4–6)

## 2021-12-29 RX ORDER — ERGOCALCIFEROL 1.25 MG/1
50000 CAPSULE ORAL WEEKLY
Qty: 8 CAPSULE | Refills: 0 | Status: SHIPPED | OUTPATIENT
Start: 2021-12-29 | End: 2022-03-15

## 2021-12-29 RX ORDER — FERROUS SULFATE 325(65) MG
325 TABLET ORAL
Qty: 90 TABLET | Refills: 1 | Status: SHIPPED | OUTPATIENT
Start: 2021-12-29 | End: 2022-09-30

## 2021-12-31 LAB
RETINYL PALMITATE: 0.03 MG/L (ref 0–0.1)
VITAMIN A LEVEL: 0.48 MG/L (ref 0.3–1.2)
VITAMIN A, INTERP: NORMAL
VITAMIN B1 WHOLE BLOOD: 73 NMOL/L (ref 70–180)
ZINC: 64.4 UG/DL (ref 60–120)

## 2022-01-07 ENCOUNTER — HOSPITAL ENCOUNTER (EMERGENCY)
Age: 42
Discharge: LEFT BEFORE DISCHARGE | End: 2022-01-07

## 2022-01-07 VITALS
BODY MASS INDEX: 26.66 KG/M2 | OXYGEN SATURATION: 98 % | TEMPERATURE: 98.2 F | RESPIRATION RATE: 20 BRPM | DIASTOLIC BLOOD PRESSURE: 88 MMHG | SYSTOLIC BLOOD PRESSURE: 148 MMHG | HEIGHT: 65 IN | WEIGHT: 160 LBS | HEART RATE: 97 BPM

## 2022-01-07 ASSESSMENT — PAIN SCALES - GENERAL: PAINLEVEL_OUTOF10: 8

## 2022-01-07 ASSESSMENT — PAIN DESCRIPTION - ORIENTATION: ORIENTATION: RIGHT

## 2022-01-07 ASSESSMENT — PAIN DESCRIPTION - LOCATION: LOCATION: FLANK

## 2022-01-07 ASSESSMENT — PAIN DESCRIPTION - FREQUENCY: FREQUENCY: CONTINUOUS

## 2022-01-07 ASSESSMENT — PAIN DESCRIPTION - DESCRIPTORS: DESCRIPTORS: SHOOTING

## 2022-01-07 ASSESSMENT — PAIN DESCRIPTION - PAIN TYPE: TYPE: ACUTE PAIN

## 2022-01-08 ENCOUNTER — APPOINTMENT (OUTPATIENT)
Dept: CT IMAGING | Age: 42
End: 2022-01-08
Payer: COMMERCIAL

## 2022-01-08 ENCOUNTER — HOSPITAL ENCOUNTER (EMERGENCY)
Age: 42
Discharge: HOME | End: 2022-01-08
Attending: EMERGENCY MEDICINE
Payer: COMMERCIAL

## 2022-01-08 VITALS
OXYGEN SATURATION: 100 % | WEIGHT: 160 LBS | BODY MASS INDEX: 26.66 KG/M2 | HEIGHT: 65 IN | TEMPERATURE: 98 F | SYSTOLIC BLOOD PRESSURE: 150 MMHG | HEART RATE: 85 BPM | RESPIRATION RATE: 18 BRPM | DIASTOLIC BLOOD PRESSURE: 90 MMHG

## 2022-01-08 DIAGNOSIS — R10.9 RIGHT FLANK PAIN: Primary | ICD-10-CM

## 2022-01-08 LAB
-: ABNORMAL
ABSOLUTE EOS #: 0.04 K/UL (ref 0–0.44)
ABSOLUTE IMMATURE GRANULOCYTE: 0.02 K/UL (ref 0–0.3)
ABSOLUTE LYMPH #: 1.58 K/UL (ref 1.1–3.7)
ABSOLUTE MONO #: 0.43 K/UL (ref 0.1–1.2)
ALBUMIN SERPL-MCNC: 4 G/DL (ref 3.5–5.2)
ALBUMIN/GLOBULIN RATIO: NORMAL (ref 1–2.5)
ALP BLD-CCNC: 48 U/L (ref 35–104)
ALT SERPL-CCNC: 15 U/L (ref 5–33)
AMORPHOUS: ABNORMAL
AMYLASE: 87 U/L (ref 28–100)
ANION GAP SERPL CALCULATED.3IONS-SCNC: 10 MMOL/L (ref 9–17)
AST SERPL-CCNC: 20 U/L
BACTERIA: ABNORMAL
BASOPHILS # BLD: 0 % (ref 0–2)
BASOPHILS ABSOLUTE: <0.03 K/UL (ref 0–0.2)
BILIRUB SERPL-MCNC: 0.37 MG/DL (ref 0.3–1.2)
BILIRUBIN URINE: NEGATIVE
BUN BLDV-MCNC: 7 MG/DL (ref 6–20)
BUN/CREAT BLD: 11 (ref 9–20)
CALCIUM SERPL-MCNC: 9.2 MG/DL (ref 8.6–10.4)
CASTS UA: ABNORMAL /LPF
CHLORIDE BLD-SCNC: 103 MMOL/L (ref 98–107)
CHP ED QC CHECK: NORMAL
CO2: 26 MMOL/L (ref 20–31)
COLOR: YELLOW
COMMENT UA: ABNORMAL
CREAT SERPL-MCNC: 0.61 MG/DL (ref 0.5–0.9)
CRYSTALS, UA: ABNORMAL /HPF
DIFFERENTIAL TYPE: ABNORMAL
EOSINOPHILS RELATIVE PERCENT: 1 % (ref 1–4)
EPITHELIAL CELLS UA: ABNORMAL /HPF (ref 0–5)
GFR AFRICAN AMERICAN: >60 ML/MIN
GFR NON-AFRICAN AMERICAN: >60 ML/MIN
GFR SERPL CREATININE-BSD FRML MDRD: NORMAL ML/MIN/{1.73_M2}
GFR SERPL CREATININE-BSD FRML MDRD: NORMAL ML/MIN/{1.73_M2}
GLUCOSE BLD-MCNC: 80 MG/DL (ref 70–99)
GLUCOSE URINE: NEGATIVE
HCT VFR BLD CALC: 31.4 % (ref 36.3–47.1)
HEMOGLOBIN: 9.4 G/DL (ref 11.9–15.1)
IMMATURE GRANULOCYTES: 0 %
KETONES, URINE: ABNORMAL
LEUKOCYTE ESTERASE, URINE: NEGATIVE
LIPASE: 31 U/L (ref 13–60)
LYMPHOCYTES # BLD: 35 % (ref 24–43)
MCH RBC QN AUTO: 24 PG (ref 25.2–33.5)
MCHC RBC AUTO-ENTMCNC: 29.9 G/DL (ref 28.4–34.8)
MCV RBC AUTO: 80.1 FL (ref 82.6–102.9)
MONOCYTES # BLD: 9 % (ref 3–12)
MUCUS: ABNORMAL
NITRITE, URINE: NEGATIVE
NRBC AUTOMATED: 0 PER 100 WBC
OTHER OBSERVATIONS UA: ABNORMAL
PDW BLD-RTO: 15.5 % (ref 11.8–14.4)
PH UA: 7.5 (ref 5–8)
PLATELET # BLD: 309 K/UL (ref 138–453)
PLATELET ESTIMATE: ABNORMAL
PMV BLD AUTO: 8.9 FL (ref 8.1–13.5)
POTASSIUM SERPL-SCNC: 4.2 MMOL/L (ref 3.7–5.3)
PREGNANCY TEST URINE, POC: NORMAL
PROTEIN UA: NEGATIVE
RBC # BLD: 3.92 M/UL (ref 3.95–5.11)
RBC # BLD: ABNORMAL 10*6/UL
RBC UA: ABNORMAL /HPF (ref 0–2)
RENAL EPITHELIAL, UA: ABNORMAL /HPF
SEG NEUTROPHILS: 55 % (ref 36–65)
SEGMENTED NEUTROPHILS ABSOLUTE COUNT: 2.49 K/UL (ref 1.5–8.1)
SODIUM BLD-SCNC: 139 MMOL/L (ref 135–144)
SPECIFIC GRAVITY UA: 1.02 (ref 1–1.03)
TOTAL PROTEIN: 8.3 G/DL (ref 6.4–8.3)
TRICHOMONAS: ABNORMAL
TURBIDITY: ABNORMAL
URINE HGB: NEGATIVE
UROBILINOGEN, URINE: NORMAL
WBC # BLD: 4.6 K/UL (ref 3.5–11.3)
WBC # BLD: ABNORMAL 10*3/UL
WBC UA: ABNORMAL /HPF (ref 0–5)
YEAST: ABNORMAL

## 2022-01-08 PROCEDURE — 82150 ASSAY OF AMYLASE: CPT

## 2022-01-08 PROCEDURE — 81001 URINALYSIS AUTO W/SCOPE: CPT

## 2022-01-08 PROCEDURE — 83690 ASSAY OF LIPASE: CPT

## 2022-01-08 PROCEDURE — 85025 COMPLETE CBC W/AUTO DIFF WBC: CPT

## 2022-01-08 PROCEDURE — 74176 CT ABD & PELVIS W/O CONTRAST: CPT

## 2022-01-08 PROCEDURE — 80053 COMPREHEN METABOLIC PANEL: CPT

## 2022-01-08 PROCEDURE — 99282 EMERGENCY DEPT VISIT SF MDM: CPT

## 2022-01-08 ASSESSMENT — ENCOUNTER SYMPTOMS
ABDOMINAL DISTENTION: 0
FACIAL SWELLING: 0
EYE PAIN: 0
BACK PAIN: 0
ABDOMINAL PAIN: 0
CHEST TIGHTNESS: 0
EYE DISCHARGE: 0
SHORTNESS OF BREATH: 0

## 2022-01-08 ASSESSMENT — PAIN SCALES - GENERAL: PAINLEVEL_OUTOF10: 8

## 2022-01-08 NOTE — ED PROVIDER NOTES
EMERGENCY DEPARTMENT ENCOUNTER    Pt Name: Allan Springer  MRN: [id number]  [place] 1980  Date of evaluation: 1/8/22  CHIEF COMPLAINT       Chief Complaint   Patient presents with    Flank Pain     right sided     HISTORY OF PRESENT ILLNESS   HPI   The patient is a 43-year-old female who presented to the emergency department secondary to flank pain. Patient complains of 4-day history of pain localized to her right flank. She rates the pain 8 out of 10 on the pain scale. She has a previous history of kidney stones and states pain is similar nature. She has had dark-colored urine no hematuria or dysuria. Last menstrual period was December 18, 2021. Patient denied recent antibiotic use or travel outside the country. She denied diarrhea. She denied previous history of pancreatitis, diverticulitis or gallstones. Of note, patient underwent gastric sleeve surgery several years ago and was told that kidney stones are a possibility after the surgery. Patient denied chest pain, shortness of breath, nausea, vomiting, fevers or chills. REVIEW OF SYSTEMS     Review of Systems   Constitutional: Negative for chills, diaphoresis and fever. HENT: Negative for congestion, ear pain and facial swelling. Eyes: Negative for pain, discharge and visual disturbance. Respiratory: Negative for chest tightness and shortness of breath. Cardiovascular: Negative for chest pain and palpitations. Gastrointestinal: Negative for abdominal distention and abdominal pain. Genitourinary: Positive for flank pain. Negative for difficulty urinating. Musculoskeletal: Negative for back pain. Skin: Negative for wound. Neurological: Negative for dizziness, light-headedness and headaches.      PASTMEDICAL HISTORY     Past Medical History:   Diagnosis Date    GERD (gastroesophageal reflux disease)     Hyperlipidemia     not on rx    Hypertension     managed per pcp    Kidney stone     Major depressive disorder, recurrent episode, mild ([facility] [address] 10/21/2015    Palmira Hollingsworth    Obesity, Class III, BMI 40-49.9 (morbid obesity) ([facility] [address]     TRISHA (obstructive sleep apnea)     Saw Dr. Shelbie Pope for clearance for surgery    Pre-diabetes     diet control    Presence of tooth-root and mandibular implants     all top teeth    Tachycardia     per pt. Saw Dr. Mervat Jeong 2020    Wears contact lenses     Instructed not to wear contacts for surgery     SURGICAL HISTORY       Past Surgical History:   Procedure Laterality Date     SECTION      x1    SLEEVE GASTRECTOMY  2021    LAPAROSCOPIC ROBOTIC GASTRECTOMY SLEEVE, HIATAL HERNIA REPAIR, EGD     SLEEVE GASTRECTOMY N/A 2021    XI LAPAROSCOPIC ROBOTIC GASTRECTOMY SLEEVE, HIATAL HERNIA REPAIR, EGD - GI UNIT SCHEDULED. performed by Karma Ferrara DO at [address] N/A 2021    EGD BIOPSY performed by Karma Ferrara DO at [facility]     CURRENT MEDICATIONS       Previous Medications    AMLODIPINE (NORVASC) 5 MG TABLET        FERROUS SULFATE (IRON 325) 325 (65 FE) MG TABLET    Take 1 tablet by mouth daily (with breakfast)    METOPROLOL SUCCINATE 25 MG CS24    Take by mouth 1/2 tab daily per Dr. Flor Rea    MONTELUKAST (SINGULAIR) 10 MG TABLET    TAKE 1 TABLET BY MOUTH DAILY    MULTIPLE VITAMINS-MINERALS (MULTIVITAMIN WITH MINERALS) TABLET    Take 2 tablets by mouth daily    POTASSIUM CHLORIDE (KLOR-CON M) 20 MEQ EXTENDED RELEASE TABLET    TAKE 2 TABLETS BY MOUTH ONCE DAILY    TAMSULOSIN (FLOMAX) 0.4 MG CAPSULE        VITAMIN D (ERGOCALCIFEROL) 1.25 MG (33893 UT) CAPS CAPSULE    Take 1 capsule by mouth once a week for 8 doses     ALLERGIES     has No Known Allergies. FAMILY HISTORY     She indicated that her mother is alive. She indicated that her father is . She indicated that the status of her maternal grandmother is unknown. She indicated that the status of her maternal aunt is unknown.  She indicated that the status of her maternal uncle is unknown. She indicated that the status of her neg hx is unknown. SOCIAL HISTORY       Social History     Tobacco Use    Smoking status: Never Smoker    Smokeless tobacco: Never Used   Vaping Use    Vaping Use: Never used   Substance Use Topics    Alcohol use: No     Alcohol/week: 0.0 standard drinks    Drug use: No     PHYSICAL EXAM     INITIAL VITALS: BP (!) 150/90   Pulse 85   Temp 98 °F (36.7 °C) (Oral)   Resp 18   Ht 5' 5\" (1.651 m)   Wt 160 lb (72.6 kg)   LMP 12/17/2021   SpO2 100%   BMI 26.63 kg/m²    Physical Exam  Vitals and nursing note reviewed. Constitutional:       General: She is not in acute distress. Appearance: She is well-developed. She is not diaphoretic. HENT:      Head: Normocephalic and atraumatic. Eyes:      Pupils: Pupils are equal, round, and reactive to light. Cardiovascular:      Rate and Rhythm: Normal rate and regular rhythm. Pulmonary:      Effort: Pulmonary effort is normal.      Breath sounds: Normal breath sounds. Abdominal:      General: Bowel sounds are normal.      Palpations: Abdomen is soft. Musculoskeletal:         General: Normal range of motion. Cervical back: Normal range of motion and neck supple. Skin:     General: Skin is warm. Capillary Refill: Capillary refill takes less than 2 seconds. Neurological:      Mental Status: She is alert and oriented to person, place, and time. MEDICAL DECISION MAKING:   The patient is a 70-year-old female who presented to the emergency department secondary to flank pain. Orders for CT of the pelvis noncontrast, UA, urine pregnancy as well as labs. Patient will be reevaluated. No acute findings on laboratory analysis, CT within normal limits, UA also within normal limits. Discussed with patient no acute findings. Patient is discharged home with outpatient follow-up and given parameters to return to the emergency department.        All patient's question's and concerns were answered prior to disposition and patient and/or family expressed understanding and agreement of treatment plan. CRITICAL CARE:              NIH STROKE SCALE:            PROCEDURES:    Procedures    DIAGNOSTIC RESULTS   EKG:All EKG's are interpreted by the Emergency Department Physician who either signs or Co-signs this chart in the absence of a cardiologist.        RADIOLOGY:All plain film, CT, MRI, and formal ultrasound images (except ED bedside ultrasound) are read by the radiologist, see reports below, unless otherwisenoted in MDM or here. CT ABDOMEN PELVIS WO CONTRAST Additional Contrast? None   Final Result   No evidence of obstructive uropathy. Minimal punctate right nephrolithiasis. Previously seen stone in the left upper pole of the kidney has resolved. Normal appendix. Diverticulosis but no acute diverticulitis. RECOMMENDATIONS   Unavailable           LABS: All lab results were reviewed by myself, and all abnormals are listed below.   Labs Reviewed   CBC WITH AUTO DIFFERENTIAL - Abnormal; Notable for the following components:       Result Value    RBC 3.92 (*)     Hemoglobin 9.4 (*)     Hematocrit 31.4 (*)     MCV 80.1 (*)     MCH 24.0 (*)     RDW 15.5 (*)     All other components within normal limits   URINALYSIS WITH MICROSCOPIC - Abnormal; Notable for the following components:    Turbidity UA SLIGHTLY CLOUDY (*)     Ketones, Urine TRACE (*)     All other components within normal limits   POCT URINE PREGNANCY - Normal   COMPREHENSIVE METABOLIC PANEL W/ REFLEX TO MG FOR LOW K   LIPASE   AMYLASE       EMERGENCY DEPARTMENTCOURSE:         Vitals:    Vitals:    01/08/22 0955   BP: (!) 150/90   Pulse: 85   Resp: 18   Temp: 98 °F (36.7 °C)   TempSrc: Oral   SpO2: 100%   Weight: 160 lb (72.6 kg)   Height: 5' 5\" (1.651 m)       The patient was given the following medications while in the emergency department:  No orders of the defined types were placed in this encounter. CONSULTS:  None    FINAL IMPRESSION      1. Right flank pain          DISPOSITION/PLAN   DISPOSITION Decision To Discharge 01/08/2022 11:49:35 AM      PATIENT REFERRED TO:  LUZ Holcomb - [facility]  [address]  [phone]          DISCHARGE MEDICATIONS:  New Prescriptions    No medications on file     Lissa Prince MD  Attending Emergency Physician      The care is provided during an unprecedented national emergency due to the novel coronavirus, COVID 19. This note was created with the assistance of a speech-recognition program. While intending to generate a document that actually reflects the content of the visit, no guarantees can be provided that every mistake has been identified and corrected by editing.     Katerin Duran MD  55/08/40 3740

## 2022-02-10 ENCOUNTER — TELEMEDICINE (OUTPATIENT)
Dept: FAMILY MEDICINE CLINIC | Age: 42
End: 2022-02-10
Payer: COMMERCIAL

## 2022-02-10 DIAGNOSIS — I10 ESSENTIAL HYPERTENSION: Primary | ICD-10-CM

## 2022-02-10 DIAGNOSIS — E87.6 HYPOKALEMIA: ICD-10-CM

## 2022-02-10 PROCEDURE — 99214 OFFICE O/P EST MOD 30 MIN: CPT | Performed by: NURSE PRACTITIONER

## 2022-02-10 PROCEDURE — G8427 DOCREV CUR MEDS BY ELIG CLIN: HCPCS | Performed by: NURSE PRACTITIONER

## 2022-02-10 ASSESSMENT — ENCOUNTER SYMPTOMS
COUGH: 0
GASTROINTESTINAL NEGATIVE: 1
ALLERGIC/IMMUNOLOGIC NEGATIVE: 1
RESPIRATORY NEGATIVE: 1
EYES NEGATIVE: 1
SHORTNESS OF BREATH: 0
VOMITING: 0
NAUSEA: 0
DIARRHEA: 0
CHEST TIGHTNESS: 0
COLOR CHANGE: 0

## 2022-02-14 ENCOUNTER — TELEPHONE (OUTPATIENT)
Dept: FAMILY MEDICINE CLINIC | Age: 42
End: 2022-02-14

## 2022-02-14 DIAGNOSIS — I10 PRIMARY HYPERTENSION: Primary | ICD-10-CM

## 2022-02-14 RX ORDER — AMLODIPINE BESYLATE 10 MG/1
10 TABLET ORAL DAILY
Qty: 90 TABLET | Refills: 1 | Status: SHIPPED | OUTPATIENT
Start: 2022-02-14 | End: 2022-09-30 | Stop reason: SDUPTHER

## 2022-02-14 NOTE — TELEPHONE ENCOUNTER
I am going to increase her amlodipine to 10 mg daily. I have sent a new script. Follow-up as discussed during VV in a month. Notify if questions.

## 2022-03-11 DIAGNOSIS — R94.2 ABNORMAL PFTS (PULMONARY FUNCTION TESTS): ICD-10-CM

## 2022-03-11 DIAGNOSIS — J30.9 ALLERGIC RHINITIS, UNSPECIFIED SEASONALITY, UNSPECIFIED TRIGGER: ICD-10-CM

## 2022-03-11 DIAGNOSIS — E87.6 HYPOKALEMIA: ICD-10-CM

## 2022-03-11 RX ORDER — MONTELUKAST SODIUM 10 MG/1
10 TABLET ORAL DAILY
Qty: 90 TABLET | Refills: 1 | Status: SHIPPED | OUTPATIENT
Start: 2022-03-11 | End: 2022-08-25

## 2022-03-11 RX ORDER — POTASSIUM CHLORIDE 20 MEQ/1
TABLET, EXTENDED RELEASE ORAL
Qty: 60 TABLET | Refills: 5 | Status: SHIPPED | OUTPATIENT
Start: 2022-03-11 | End: 2022-07-15 | Stop reason: ALTCHOICE

## 2022-03-11 NOTE — TELEPHONE ENCOUNTER
Tomas Booth is calling to request a refill on the following medication(s):    Medication Request:  Requested Prescriptions     Pending Prescriptions Disp Refills    montelukast (SINGULAIR) 10 MG tablet [Pharmacy Med Name: MONTELUKAST 10 MG TAB 10 Tablet] 30 tablet 5     Sig: TAKE 1 TABLET BY MOUTH DAILY    potassium chloride (KLOR-CON M) 20 MEQ extended release tablet [Pharmacy Med Name: POTASSIUM CHL 20MEQ MICRO T 20 Tablet] 60 tablet 5     Sig: TAKE TWO (2) TABLETS BY MOUTH ONCE DAILY       Last Visit Date (If Applicable):  0/37/7250    Next Visit Date:    3/15/2022

## 2022-03-15 ENCOUNTER — OFFICE VISIT (OUTPATIENT)
Dept: FAMILY MEDICINE CLINIC | Age: 42
End: 2022-03-15
Payer: COMMERCIAL

## 2022-03-15 VITALS
SYSTOLIC BLOOD PRESSURE: 159 MMHG | WEIGHT: 143.8 LBS | BODY MASS INDEX: 23.93 KG/M2 | TEMPERATURE: 97.3 F | OXYGEN SATURATION: 98 % | HEART RATE: 101 BPM | DIASTOLIC BLOOD PRESSURE: 102 MMHG

## 2022-03-15 DIAGNOSIS — I10 ESSENTIAL HYPERTENSION: Primary | ICD-10-CM

## 2022-03-15 DIAGNOSIS — Z98.84 S/P LAPAROSCOPIC SLEEVE GASTRECTOMY: ICD-10-CM

## 2022-03-15 DIAGNOSIS — E78.2 MIXED HYPERLIPIDEMIA: ICD-10-CM

## 2022-03-15 PROBLEM — E66.3 OVERWEIGHT (BMI 25.0-29.9): Status: RESOLVED | Noted: 2021-12-28 | Resolved: 2022-03-15

## 2022-03-15 PROBLEM — R73.03 PREDIABETES: Status: RESOLVED | Noted: 2020-07-27 | Resolved: 2022-03-15

## 2022-03-15 PROCEDURE — 1036F TOBACCO NON-USER: CPT | Performed by: NURSE PRACTITIONER

## 2022-03-15 PROCEDURE — G8420 CALC BMI NORM PARAMETERS: HCPCS | Performed by: NURSE PRACTITIONER

## 2022-03-15 PROCEDURE — G8484 FLU IMMUNIZE NO ADMIN: HCPCS | Performed by: NURSE PRACTITIONER

## 2022-03-15 PROCEDURE — G8427 DOCREV CUR MEDS BY ELIG CLIN: HCPCS | Performed by: NURSE PRACTITIONER

## 2022-03-15 PROCEDURE — 99214 OFFICE O/P EST MOD 30 MIN: CPT | Performed by: NURSE PRACTITIONER

## 2022-03-15 ASSESSMENT — ENCOUNTER SYMPTOMS
DIARRHEA: 0
WHEEZING: 0
ALLERGIC/IMMUNOLOGIC NEGATIVE: 1
COUGH: 0
GASTROINTESTINAL NEGATIVE: 1
NAUSEA: 0
EYES NEGATIVE: 1
CHEST TIGHTNESS: 0
COLOR CHANGE: 0
RESPIRATORY NEGATIVE: 1
VOMITING: 0
SHORTNESS OF BREATH: 0

## 2022-03-15 ASSESSMENT — PATIENT HEALTH QUESTIONNAIRE - PHQ9
SUM OF ALL RESPONSES TO PHQ QUESTIONS 1-9: 0
1. LITTLE INTEREST OR PLEASURE IN DOING THINGS: 0
SUM OF ALL RESPONSES TO PHQ QUESTIONS 1-9: 0
2. FEELING DOWN, DEPRESSED OR HOPELESS: 0
SUM OF ALL RESPONSES TO PHQ9 QUESTIONS 1 & 2: 0
SUM OF ALL RESPONSES TO PHQ QUESTIONS 1-9: 0
SUM OF ALL RESPONSES TO PHQ QUESTIONS 1-9: 0

## 2022-03-15 NOTE — PROGRESS NOTES
[facility]  [address]  Dept: [phone]    Yordy Baig is a 35 y.o. female who presents today for her medical conditions/complaints as noted below. Yordy Baig is here today c/o Hypertension    Past Medical History:   Diagnosis Date    GERD (gastroesophageal reflux disease)     Hyperlipidemia     not on rx    Hypertension     managed per pcp    Kidney stone     Major depressive disorder, recurrent episode, mild ([facility] [address] 10/21/2015    Palmira Hollingsworth    Obesity, Class III, BMI 40-49.9 (morbid obesity) ([facility] [address]     TRISHA (obstructive sleep apnea)     Saw Dr. Jojo Pretty for clearance for surgery    Pre-diabetes     diet control    Presence of tooth-root and mandibular implants     all top teeth    Tachycardia     per pt.  Saw Dr. Renae Rosa 2020    Wears contact lenses     Instructed not to wear contacts for surgery      Past Surgical History:   Procedure Laterality Date     SECTION      x1    SLEEVE GASTRECTOMY  2021    LAPAROSCOPIC ROBOTIC GASTRECTOMY SLEEVE, HIATAL HERNIA REPAIR, EGD     SLEEVE GASTRECTOMY N/A 2021    XI LAPAROSCOPIC ROBOTIC GASTRECTOMY SLEEVE, HIATAL HERNIA REPAIR, EGD - GI UNIT SCHEDULED. performed by Amy Martin DO at [address] [place] [address] ENDOSCOPY N/A 2021    EGD BIOPSY performed by Amy Martin DO at [facility]       Family History   Problem Relation Age of Onset    Hypertension Mother     Asthma Mother     Hypertension Maternal Aunt     Asthma Maternal Uncle     Hypertension Maternal Uncle     Arthritis Maternal Grandmother     Hypertension Maternal Grandmother     Stroke Maternal Grandmother     Breast Cancer Neg Hx     Cancer Neg Hx     Colon Cancer Neg Hx     Diabetes Neg Hx     Eclampsia Neg Hx     Ovarian Cancer Neg Hx      Labor Neg Hx     Spont Abortions Neg Hx        Social History     Tobacco Use    Smoking status: Never Smoker    Smokeless tobacco: Never Used   Substance Use Topics    Alcohol use: No     Alcohol/week: 0.0 standard drinks      Current Outpatient Medications   Medication Sig Dispense Refill    montelukast (SINGULAIR) 10 MG tablet TAKE 1 TABLET BY MOUTH DAILY 90 tablet 1    potassium chloride (KLOR-CON M) 20 MEQ extended release tablet TAKE TWO (2) TABLETS BY MOUTH ONCE DAILY 60 tablet 5    amLODIPine (NORVASC) 10 MG tablet Take 1 tablet by mouth daily 90 tablet 1    vitamin D (ERGOCALCIFEROL) 1.25 MG (96566 UT) CAPS capsule Take 1 capsule by mouth once a week for 8 doses 8 capsule 0    ferrous sulfate (IRON 325) 325 (65 Fe) MG tablet Take 1 tablet by mouth daily (with breakfast) 90 tablet 1    Multiple Vitamins-Minerals (MULTIVITAMIN WITH MINERALS) tablet Take 2 tablets by mouth daily 30 tablet 0    Metoprolol Succinate 25 MG CS24 Take by mouth 1/2 tab daily per Dr. Marta Varela       No current facility-administered medications for this visit. No Known Allergies      HPI:     HPI    Presents today c/o follow-up HTN  Taking amlodipine & metoprolol   Missed medications today   Otherwise tolerating medications without side effects   BP running 130 / 80's on average, checks at work [facility])    Patient is s/p gastric sleeve with a weight loss of > 90 lbs   Diet is good overall  Caffeine intake declines   Exercise includes being active with children at home  Currently fostering 9year old daughter     H/o pre diabetes resolved with weight loss    H/o HLD improved with weight loss     H/o hypokalemia    H/o tachycardia - metoprolol through Cardiology     Health Maintenance:      Subjective:     Review of Systems   Constitutional: Negative. Negative for appetite change, chills, diaphoresis, fatigue, fever and unexpected weight change. HENT: Negative. Eyes: Negative. Respiratory: Negative. Negative for cough, chest tightness, shortness of breath and wheezing. Cardiovascular: Negative.   Negative for chest pain and leg swelling. Gastrointestinal: Negative. Negative for diarrhea, nausea and vomiting. Endocrine: Negative. Genitourinary: Negative. Negative for difficulty urinating. Musculoskeletal: Negative. Skin: Negative. Negative for color change, pallor, rash and wound. Allergic/Immunologic: Negative. Neurological: Negative. Negative for dizziness, seizures, syncope, light-headedness and headaches. Hematological: Negative. Psychiatric/Behavioral: Negative. Negative for dysphoric mood. The patient is not nervous/anxious. Objective:     Vitals:    03/15/22 1558   BP: (!) 152/98   Pulse: 101   Temp: 97.3 °F (36.3 °C)   SpO2: 98%     Vitals:    03/15/22 1610   BP: (!) 159/102   Pulse:    Temp:    SpO2:        Body mass index is 23.93 kg/m². Physical Exam  Constitutional:       General: She is not in acute distress. Appearance: Normal appearance. She is well-developed and normal weight. She is not ill-appearing, toxic-appearing or diaphoretic. HENT:      Head: Normocephalic and atraumatic. Right Ear: External ear normal.      Left Ear: External ear normal.      Nose: Nose normal.   Eyes:      General: No scleral icterus. Right eye: No discharge. Left eye: No discharge. Conjunctiva/sclera: Conjunctivae normal.      Pupils: Pupils are equal, round, and reactive to light. Neck:      Trachea: No tracheal deviation. Cardiovascular:      Rate and Rhythm: Normal rate and regular rhythm. Heart sounds: Normal heart sounds. No murmur heard. No friction rub. No gallop. Pulmonary:      Effort: Pulmonary effort is normal. No tachypnea, accessory muscle usage or respiratory distress. Breath sounds: Normal breath sounds. No stridor. No decreased breath sounds, wheezing, rhonchi or rales. Abdominal:      Palpations: Abdomen is soft. Musculoskeletal:         General: No tenderness or deformity. Normal range of motion.       Cervical back: Normal range of motion and neck supple. Skin:     General: Skin is warm and dry. Coloration: Skin is not pale. Findings: No erythema or rash. Neurological:      Mental Status: She is alert and oriented to person, place, and time. GCS: GCS eye subscore is 4. GCS verbal subscore is 5. GCS motor subscore is 6. Gait: Gait is intact. Gait normal.   Psychiatric:         Speech: Speech normal.         Behavior: Behavior normal.         Thought Content: Thought content normal.         Judgment: Judgment normal.           Assessment:         1. Essential hypertension    2. S/P laparoscopic sleeve gastrectomy    3. Mixed hyperlipidemia        Plan:     1. Essential hypertension    - CBC with Auto Differential; Future  - Comprehensive Metabolic Panel; Future    BP above goal secondary to missing meds  Encouraged compliance  Continue home BP monitoring for goal < 140/90  MyChart me an updated log in 2-4 weeks  Follow-up 4 months or sooner PRN   Lifestyle modifications     2. S/P laparoscopic sleeve gastrectomy    Follows w/ Surgeon     3. Mixed hyperlipidemia    - Lipid Panel; Future      Discussed use, benefit, and side effects of prescribed medications. All patient questions answered. Pt voiced understanding. Reviewed health maintenance. Instructed to continue current medications, diet and exercise. Patient agreedwith treatment plan. Follow up as directed.      Electronically signed by LUZ Paris CNP on 3/15/2022

## 2022-03-15 NOTE — PATIENT INSTRUCTIONS
antonio serrato an updated BP log in 2-4 weeks   Patient Education        Elevated Blood Pressure: Care Instructions  Your Care Instructions    Blood pressure is a measure of how hard the blood pushes against the walls of your arteries. It's normal for blood pressure to go up and down throughout the day. But if it stays up over time, you have high blood pressure. Two numbers tell you your blood pressure. The first number is the systolic pressure. It shows how hard the blood pushes when your heart is pumping. The second number is the diastolic pressure. It shows how hard the blood pushes between heartbeats, when your heart is relaxed and filling with blood. An ideal blood pressure in adults is less than 120/80 (say \"120 over 80\"). High blood pressure is 140/90 or higher. You have high blood pressure if your top number is 140 or higher or your bottom number is 90 or higher, or both. The main test for high blood pressure is simple, fast, and painless. To diagnose high blood pressure, your doctor will test your blood pressure at different times. After testing your blood pressure, your doctor may ask you to test it again when you are home. If you are diagnosed with high blood pressure, you can work with your doctor to make a long-term plan to manage it. Follow-up care is a key part of your treatment and safety. Be sure to make and go to all appointments, and call your doctor if you are having problems. It's also a good idea to know your test results and keep a list of the medicines you take. How can you care for yourself at home? · Do not smoke. Smoking increases your risk for heart attack and stroke. If you need help quitting, talk to your doctor about stop-smoking programs and medicines. These can increase your chances of quitting for good. · Stay at a healthy weight. · Try to limit how much sodium you eat to less than 2,300 milligrams (mg) a day. Your doctor may ask you to try to eat less than 1,500 mg a day.   · Be physically active. Get at least 30 minutes of exercise on most days of the week. Walking is a good choice. You also may want to do other activities, such as running, swimming, cycling, or playing tennis or team sports. · Avoid or limit alcohol. Talk to your doctor about whether you can drink any alcohol. · Eat plenty of fruits, vegetables, and low-fat dairy products. Eat less saturated and total fats. · Learn how to check your blood pressure at home. When should you call for help? Call your doctor now or seek immediate medical care if:    · Your blood pressure is much higher than normal (such as 180/110 or higher).     · You think high blood pressure is causing symptoms such as:  ¨ Severe headache. ¨ Blurry vision.    Watch closely for changes in your health, and be sure to contact your doctor if:    · You do not get better as expected. Where can you learn more? Go to https://E2E NetworkspeGamerius.TicketLabs. org and sign in to your [employer] account. Enter D783 in the [employer] box to learn more about \"Elevated Blood Pressure: Care Instructions. \"     If you do not have an account, please click on the \"Sign Up Now\" link. Current as of: May 10, 2017  Content Version: 11.6  © 9126-5936 [employer]. Care instructions adapted under license by [facility] ([facility]). If you have questions about a medical condition or this instruction, always ask your healthcare professional. [place] [address] any warranty or liability for your use of this information. Patient Education        High Blood Pressure: Care Instructions  Overview     It's normal for blood pressure to go up and down throughout the day. But if it stays up, you have high blood pressure. Another name for high blood pressure is hypertension. Despite what a lot of people think, high blood pressure usually doesn't cause headaches or make you feel dizzy or lightheaded. It usually has no symptoms.  But it does increase your risk of stroke, heart attack, and other problems. You and your doctor will talk about your risks of these problems based on your blood pressure. Your doctor will give you a goal for your blood pressure. Your goal will be based on your health and your age. Lifestyle changes, such as eating healthy and being active, are always important to help lower blood pressure. You might also take medicine to reach your blood pressure goal.  Follow-up care is a key part of your treatment and safety. Be sure to make and go to all appointments, and call your doctor if you are having problems. It's also a good idea to know your test results and keep a list of the medicines you take. How can you care for yourself at home? Medical treatment  · If you stop taking your medicine, your blood pressure will go back up. You may take one or more types of medicine to lower your blood pressure. Be safe with medicines. Take your medicine exactly as prescribed. Call your doctor if you think you are having a problem with your medicine. · Talk to your doctor before you start taking aspirin every day. Aspirin can help certain people lower their risk of a heart attack or stroke. But taking aspirin isn't right for everyone, because it can cause serious bleeding. · See your doctor regularly. You may need to see the doctor more often at first or until your blood pressure comes down. · If you are taking blood pressure medicine, talk to your doctor before you take decongestants or anti-inflammatory medicine, such as ibuprofen. Some of these medicines can raise blood pressure. · Learn how to check your blood pressure at home. Lifestyle changes  · Stay at a healthy weight. This is especially important if you put on weight around the waist. Losing even 10 pounds can help you lower your blood pressure. · If your doctor recommends it, get more exercise. Walking is a good choice. Bit by bit, increase the amount you walk every day.  Try for at least 30 minutes on most days of the week. You also may want to swim, bike, or do other activities. · Avoid or limit alcohol. Talk to your doctor about whether you can drink any alcohol. · Try to limit how much sodium you eat to less than 2,300 milligrams (mg) a day. Your doctor may ask you to try to eat less than 1,500 mg a day. · Eat plenty of fruits (such as bananas and oranges), vegetables, legumes, whole grains, and low-fat dairy products. · Lower the amount of saturated fat in your diet. Saturated fat is found in animal products such as milk, cheese, and meat. Limiting these foods may help you lose weight and also lower your risk for heart disease. · Do not smoke. Smoking increases your risk for heart attack and stroke. If you need help quitting, talk to your doctor about stop-smoking programs and medicines. These can increase your chances of quitting for good. When should you call for help? Call 911  anytime you think you may need emergency care. This may mean having symptoms that suggest that your blood pressure is causing a serious heart or blood vessel problem. Your blood pressure may be over 180/120. For example, call 911 if:    · You have symptoms of a heart attack. These may include:  ? Chest pain or pressure, or a strange feeling in the chest.  ? Sweating. ? Shortness of breath. ? Nausea or vomiting. ? Pain, pressure, or a strange feeling in the back, neck, jaw, or upper belly or in one or both shoulders or arms. ? Lightheadedness or sudden weakness. ? A fast or irregular heartbeat.     · You have symptoms of a stroke. These may include:  ? Sudden numbness, tingling, weakness, or loss of movement in your face, arm, or leg, especially on only one side of your body. ? Sudden vision changes. ? Sudden trouble speaking. ? Sudden confusion or trouble understanding simple statements. ? Sudden problems with walking or balance.   ? A sudden, severe headache that is different from past headaches.     · You have severe back or belly pain. Do not wait until your blood pressure comes down on its own. Get help right away. Call your doctor now or seek immediate care if:    · Your blood pressure is much higher than normal (such as 180/120 or higher), but you don't have symptoms.     · You think high blood pressure is causing symptoms, such as:  ? Severe headache.  ? Blurry vision. Watch closely for changes in your health, and be sure to contact your doctor if:    · Your blood pressure measures higher than your doctor recommends at least 2 times. That means the top number is higher or the bottom number is higher, or both.     · You think you may be having side effects from your blood pressure medicine. Where can you learn more? Go to https://Kanchufang.RELEASEIF. org and sign in to your [employer] account. Enter L745 in the DeepRockDrive box to learn more about \"High Blood Pressure: Care Instructions. \"     If you do not have an account, please click on the \"Sign Up Now\" link. Current as of: April 29, 2021               Content Version: 13.1  © 6871-5217 [employer]. Care instructions adapted under license by [facility] ([facility]). If you have questions about a medical condition or this instruction, always ask your healthcare professional. [place] [address] any warranty or liability for your use of this information. Patient Education        Learning About High Blood Pressure  What is high blood pressure? Blood pressure is a measure of how hard the blood pushes against the walls of your arteries. It's normal for blood pressure to go up and down throughout the day. But if it stays up, you have high blood pressure. Another name for high blood pressure is hypertension. Two numbers tell you your blood pressure. The first number is the systolic pressure (top number). It shows how hard the blood pushes when your heart is pumping.  The second number is the diastolic physically active. Get at least 30 minutes of exercise on most days of the week. Walking is a good choice. You also may want to do other activities, such as running, swimming, cycling, or playing tennis or team sports. · Limit alcohol to 2 drinks a day for men and 1 drink a day for women. · Eat plenty of fruits, vegetables, and low-fat dairy products. Eat less saturated and total fats. How is high blood pressure treated? · Your doctor will suggest making lifestyle changes to help your heart. For example, your doctor may ask you to eat healthy foods, quit smoking, lose extra weight, and be more active. · If lifestyle changes don't help enough, your doctor may recommend that you take medicine. · When blood pressure is very high, medicines are needed to lower it. Follow-up care is a key part of your treatment and safety. Be sure to make and go to all appointments, and call your doctor if you are having problems. It's also a good idea to know your test results and keep a list of the medicines you take. Where can you learn more? Go to https://docBeat.Embarr Downs. org and sign in to your [employer] account. Enter P501 in the thredUP box to learn more about \"Learning About High Blood Pressure. \"     If you do not have an account, please click on the \"Sign Up Now\" link. Current as of: April 29, 2021               Content Version: 13.1  © 2101-1222 Healthwise, [employer]orporated. Care instructions adapted under license by [facility] ([facility]). If you have questions about a medical condition or this instruction, always ask your healthcare professional. [address] any warranty or liability for your use of this information.

## 2022-04-05 ENCOUNTER — OFFICE VISIT (OUTPATIENT)
Dept: BARIATRICS/WEIGHT MGMT | Age: 42
End: 2022-04-05
Payer: COMMERCIAL

## 2022-04-05 VITALS
HEIGHT: 65 IN | HEART RATE: 90 BPM | BODY MASS INDEX: 24.16 KG/M2 | DIASTOLIC BLOOD PRESSURE: 92 MMHG | SYSTOLIC BLOOD PRESSURE: 142 MMHG | WEIGHT: 145 LBS

## 2022-04-05 DIAGNOSIS — E78.5 HYPERLIPIDEMIA, UNSPECIFIED HYPERLIPIDEMIA TYPE: ICD-10-CM

## 2022-04-05 DIAGNOSIS — Z98.84 S/P LAPAROSCOPIC SLEEVE GASTRECTOMY: ICD-10-CM

## 2022-04-05 DIAGNOSIS — I10 ESSENTIAL HYPERTENSION: Primary | ICD-10-CM

## 2022-04-05 PROCEDURE — G8420 CALC BMI NORM PARAMETERS: HCPCS | Performed by: NURSE PRACTITIONER

## 2022-04-05 PROCEDURE — 99213 OFFICE O/P EST LOW 20 MIN: CPT | Performed by: NURSE PRACTITIONER

## 2022-04-05 PROCEDURE — G8427 DOCREV CUR MEDS BY ELIG CLIN: HCPCS | Performed by: NURSE PRACTITIONER

## 2022-04-05 PROCEDURE — 1036F TOBACCO NON-USER: CPT | Performed by: NURSE PRACTITIONER

## 2022-04-05 RX ORDER — LOSARTAN POTASSIUM 100 MG/1
TABLET ORAL DAILY
COMMUNITY
Start: 2022-03-24 | End: 2022-09-30 | Stop reason: SDUPTHER

## 2022-04-05 NOTE — PROGRESS NOTES
Post-op Bariatric Surgery Note    Subjective     Patient is 1 year s/p laparoscopic sleeve gastrectomy and hiatal hernia repair, down 84 lbs. Overall, doing well. Incisions well healed. Consistent use of MVI and calcium. Physical activity includes treadmill and weight lifting. No pain or other specific problems or concerns. Allergies:  No Known Allergies    Past Medical History:     Past Medical History:   Diagnosis Date    GERD (gastroesophageal reflux disease)     Hyperlipidemia     not on rx    Hypertension     managed per pcp    Kidney stone     Major depressive disorder, recurrent episode, mild ([facility] [address] 10/21/2015    Palmira Hollingsworth    Obesity, Class III, BMI 40-49.9 (morbid obesity) ([facility] [address]     TRISHA (obstructive sleep apnea)     Saw Dr. Aga Musa for clearance for surgery    Pre-diabetes     diet control    Presence of tooth-root and mandibular implants     all top teeth    Tachycardia     per pt. Saw Dr. Norma Hathaway 2020    Wears contact lenses     Instructed not to wear contacts for surgery   .     Past Surgical History:  Past Surgical History:   Procedure Laterality Date     SECTION      x1    SLEEVE GASTRECTOMY  2021    LAPAROSCOPIC ROBOTIC GASTRECTOMY SLEEVE, HIATAL HERNIA REPAIR, EGD     SLEEVE GASTRECTOMY N/A 2021    XI LAPAROSCOPIC ROBOTIC GASTRECTOMY SLEEVE, HIATAL HERNIA REPAIR, EGD - GI UNIT SCHEDULED. [facility]  ENDOSCOPY N/A 2021    EGD BIOPSY performed by Eric Snow DO at [facility]       Family History:  Family History   Problem Relation Age of Onset    Hypertension Mother     Asthma Mother     Hypertension Maternal Aunt     Asthma Maternal Uncle     Hypertension Maternal Uncle     Arthritis Maternal Grandmother     Hypertension Maternal Grandmother     Stroke Maternal Grandmother     Breast Cancer Neg Hx     Cancer Neg Hx     Colon Cancer Neg Hx     Diabetes Neg Hx     Eclampsia Neg Hx     Ovarian Cancer Neg Hx      Labor Neg Hx     Spont Abortions Neg Hx        Social History:  Social History     Socioeconomic History    Marital status: Single     Spouse name: Not on file    Number of children: Not on file    Years of education: Not on file    Highest education level: Not on file   Occupational History    Not on file   Tobacco Use    Smoking status: Never Smoker    Smokeless tobacco: Never Used   Vaping Use    Vaping Use: Never used   Substance and Sexual Activity    Alcohol use: No     Alcohol/week: 0.0 standard drinks    Drug use: No    Sexual activity: Not Currently     Partners: Male   Other Topics Concern    Not on file   Social History Narrative    Not on file     Social Determinants of Health     Financial Resource Strain: Low Risk     Difficulty of Paying Living Expenses: Not hard at all   Food Insecurity: No Food Insecurity    Worried About Running Out of Food in the Last Year: Never true    Bella of Food in the Last Year: Never true   Transportation Needs:     Lack of Transportation (Medical): Not on file    Lack of Transportation (Non-Medical):  Not on file   Physical Activity:     Days of Exercise per Week: Not on file    Minutes of Exercise per Session: Not on file   Stress:     Feeling of Stress : Not on file   Social Connections:     Frequency of Communication with Friends and Family: Not on file    Frequency of Social Gatherings with Friends and Family: Not on file    Attends [employer] Services: Not on file    Active Member of Clubs or Organizations: Not on file    Attends Club or Organization Meetings: Not on file    Marital Status: Not on file   Intimate Partner Violence:     Fear of Current or Ex-Partner: Not on file    Emotionally Abused: Not on file    Physically Abused: Not on file    Sexually Abused: Not on file   Housing Stability:     Unable to Pay for Housing in the Last Year: Not on file  Number of Places Lived in the Last Year: Not on file    Unstable Housing in the Last Year: Not on file       Current Medications:  Current Outpatient Medications   Medication Sig Dispense Refill    losartan (COZAAR) 100 MG tablet       montelukast (SINGULAIR) 10 MG tablet TAKE 1 TABLET BY MOUTH DAILY 90 tablet 1    potassium chloride (KLOR-CON M) 20 MEQ extended release tablet TAKE TWO (2) TABLETS BY MOUTH ONCE DAILY 60 tablet 5    amLODIPine (NORVASC) 10 MG tablet Take 1 tablet by mouth daily 90 tablet 1    ferrous sulfate (IRON 325) 325 (65 Fe) MG tablet Take 1 tablet by mouth daily (with breakfast) 90 tablet 1    Multiple Vitamins-Minerals (MULTIVITAMIN WITH MINERALS) tablet Take 2 tablets by mouth daily 30 tablet 0    Metoprolol Succinate 25 MG CS24 Take by mouth 1/2 tab daily per Dr. Yamila Stroud       No current facility-administered medications for this visit. Vital Signs:  BP (!) 142/92 (Site: Right Upper Arm, Position: Sitting, Cuff Size: Medium Adult)   Pulse 90   Ht 5' 5\" (1.651 m)   Wt 145 lb (65.8 kg)   BMI 24.13 kg/m²     BMI/Height/Weight:  Body mass index is 24.13 kg/m². Review of Systems - A review of systems was performed. All was negative unless otherwise documented in HPI. Constitutional: Negative for fever, chills and diaphoresis. HENT: Negative for hearing loss and trouble swallowing. Eyes: Negative for photophobia and visual disturbance. Respiratory: Negative for cough, shortness of breath and wheezing. Cardiovascular: Negative for chest pain and palpitations. Gastrointestinal: Negative for nausea, vomiting, abdominal pain, diarrhea, constipation, blood in stool and abdominal distention. Endocrine: Negative for polydipsia, polyphagia and polyuria. Genitourinary: Negative for dysuria, frequency, hematuria and difficulty urinating. Musculoskeletal: Negative for myalgias, joint swelling. Skin: Negative for pallor and rash.    Neurological: Negative for dizziness, tremors, light-headedness and headaches. Psychiatric/Behavioral: Negative for sleep disturbance and dysphoric mood. Objective:      Physical Exam   Vital signs reviewed. General: Well-developed and well-nourished. No acute distress. Skin: Warm, dry and intact. HEENT: Normocephalic. EOMs intact. Conjunctivae normal. Neck supple. Cardiovascular: Normal rate, regular rhythm. Pulmonary/Chest: Normal effort. Lungs clear to auscultation. No rales, rhonchi or wheezing. Abdominal: Positive bowel sounds. Soft, nontender. Nondistended. No rigidity, rebound, or guarding. Musculoskeletal: Movement x4. No edema. Neurological: Gait normal. Alert and oriented to person, place, and time. Psychiatric: Normal mood and affect. Speech and behavior normal. Judgment and thought content normal. Cognition and memory intact. Assessment:       Diagnosis Orders   1. Essential hypertension  CBC with Auto Differential    Comprehensive Metabolic Panel    Ferritin    Iron and TIBC    Hemoglobin A1C    Lipid Panel    Magnesium    PTH, Intact    TSH    Vitamin B1    Vitamin A    Vitamin B12 & Folate    Vitamin D 25 Hydroxy    Zinc   2. Hyperlipidemia, unspecified hyperlipidemia type  CBC with Auto Differential    Comprehensive Metabolic Panel    Ferritin    Iron and TIBC    Hemoglobin A1C    Lipid Panel    Magnesium    PTH, Intact    TSH    Vitamin B1    Vitamin A    Vitamin B12 & Folate    Vitamin D 25 Hydroxy    Zinc   3. S/P laparoscopic sleeve gastrectomy  CBC with Auto Differential    Comprehensive Metabolic Panel    Ferritin    Iron and TIBC    Hemoglobin A1C    Lipid Panel    Magnesium    PTH, Intact    TSH    Vitamin B1    Vitamin A    Vitamin B12 & Folate    Vitamin D 25 Hydroxy    Zinc       Plan:    Dietitian visit today. Patient to continue to increase protein to obtain 60-80g/day, at least 48-64oz of fluid daily, and gradually increase exercise regimen. BP elevated today. Asymptomatic.   PCP titrating meds. Follow-up  Return in about 6 months (around 10/5/2022). Orders this encounter:  Orders Placed This Encounter   Procedures    CBC with Auto Differential     Standing Status:   Future     Standing Expiration Date:   4/5/2023    Comprehensive Metabolic Panel     Standing Status:   Future     Standing Expiration Date:   4/5/2023    Ferritin     Standing Status:   Future     Standing Expiration Date:   4/5/2023    Iron and TIBC     Standing Status:   Future     Standing Expiration Date:   4/5/2023     Order Specific Question:   Is Patient Fasting? Answer:   no     Order Specific Question:   No of Hours? Answer:   0    Hemoglobin A1C     Standing Status:   Future     Standing Expiration Date:   4/5/2023    Lipid Panel     Standing Status:   Future     Standing Expiration Date:   4/5/2023     Order Specific Question:   Is Patient Fasting?/# of Hours     Answer:   12    Magnesium     Standing Status:   Future     Standing Expiration Date:   4/5/2023    PTH, Intact     Standing Status:   Future     Standing Expiration Date:   4/5/2023    TSH     Standing Status:   Future     Standing Expiration Date:   4/5/2023    Vitamin B1     Standing Status:   Future     Standing Expiration Date:   4/5/2023    Vitamin A     Standing Status:   Future     Standing Expiration Date:   4/5/2023    Vitamin B12 & Folate     Standing Status:   Future     Standing Expiration Date:   4/5/2023    Vitamin D 25 Hydroxy     Standing Status:   Future     Standing Expiration Date:   4/5/2023    Zinc     Standing Status:   Future     Standing Expiration Date:   4/5/2023       Prescriptions this encounter:  No orders of the defined types were placed in this encounter.       Electronically signed by:  Lindsey Fry CNP

## 2022-04-13 DIAGNOSIS — E55.9 VITAMIN D DEFICIENCY: ICD-10-CM

## 2022-04-13 RX ORDER — ERGOCALCIFEROL 1.25 MG/1
CAPSULE ORAL
Qty: 8 CAPSULE | Refills: 0 | OUTPATIENT
Start: 2022-04-13

## 2022-05-09 ENCOUNTER — OFFICE VISIT (OUTPATIENT)
Dept: FAMILY MEDICINE CLINIC | Age: 42
End: 2022-05-09
Payer: MEDICAID

## 2022-05-09 VITALS
DIASTOLIC BLOOD PRESSURE: 110 MMHG | HEART RATE: 104 BPM | TEMPERATURE: 96.8 F | BODY MASS INDEX: 23.23 KG/M2 | SYSTOLIC BLOOD PRESSURE: 156 MMHG | WEIGHT: 139.6 LBS | OXYGEN SATURATION: 100 %

## 2022-05-09 DIAGNOSIS — Z01.84 IMMUNITY STATUS TESTING: ICD-10-CM

## 2022-05-09 DIAGNOSIS — E87.6 HYPOKALEMIA: ICD-10-CM

## 2022-05-09 DIAGNOSIS — I10 ESSENTIAL HYPERTENSION: Primary | ICD-10-CM

## 2022-05-09 DIAGNOSIS — Z87.2: ICD-10-CM

## 2022-05-09 DIAGNOSIS — G89.29 CHRONIC BILATERAL LOW BACK PAIN WITH SCIATICA, SCIATICA LATERALITY UNSPECIFIED: ICD-10-CM

## 2022-05-09 DIAGNOSIS — M54.40 CHRONIC BILATERAL LOW BACK PAIN WITH SCIATICA, SCIATICA LATERALITY UNSPECIFIED: ICD-10-CM

## 2022-05-09 PROCEDURE — 99214 OFFICE O/P EST MOD 30 MIN: CPT | Performed by: NURSE PRACTITIONER

## 2022-05-09 PROCEDURE — G8420 CALC BMI NORM PARAMETERS: HCPCS | Performed by: NURSE PRACTITIONER

## 2022-05-09 PROCEDURE — 1036F TOBACCO NON-USER: CPT | Performed by: NURSE PRACTITIONER

## 2022-05-09 PROCEDURE — G8427 DOCREV CUR MEDS BY ELIG CLIN: HCPCS | Performed by: NURSE PRACTITIONER

## 2022-05-09 RX ORDER — CARVEDILOL 3.12 MG/1
3.12 TABLET ORAL 2 TIMES DAILY
Qty: 60 TABLET | Refills: 0 | Status: SHIPPED | OUTPATIENT
Start: 2022-05-09 | End: 2022-06-07

## 2022-05-09 RX ORDER — TIZANIDINE 4 MG/1
2-4 TABLET ORAL EVERY 8 HOURS PRN
Qty: 10 TABLET | Refills: 0 | Status: SHIPPED | OUTPATIENT
Start: 2022-05-09 | End: 2022-06-08

## 2022-05-09 ASSESSMENT — ENCOUNTER SYMPTOMS
EYES NEGATIVE: 1
BACK PAIN: 1
COLOR CHANGE: 0
RESPIRATORY NEGATIVE: 1
GASTROINTESTINAL NEGATIVE: 1
NAUSEA: 0
ALLERGIC/IMMUNOLOGIC NEGATIVE: 1
VOMITING: 0
DIARRHEA: 0

## 2022-05-09 ASSESSMENT — PATIENT HEALTH QUESTIONNAIRE - PHQ9
SUM OF ALL RESPONSES TO PHQ QUESTIONS 1-9: 0
SUM OF ALL RESPONSES TO PHQ9 QUESTIONS 1 & 2: 0
SUM OF ALL RESPONSES TO PHQ QUESTIONS 1-9: 0
1. LITTLE INTEREST OR PLEASURE IN DOING THINGS: 0
SUM OF ALL RESPONSES TO PHQ QUESTIONS 1-9: 0
SUM OF ALL RESPONSES TO PHQ QUESTIONS 1-9: 0
2. FEELING DOWN, DEPRESSED OR HOPELESS: 0

## 2022-05-09 NOTE — PROGRESS NOTES
[facility]  [address] [facility]  Dept: [phone]    Yisel Graham is a 35 y.o. female who presents today for her medical conditions/complaintsas noted below. Yisel Graham is here today c/o Back Pain and Skin Problem (in abdominal fold)    Past Medical History:   Diagnosis Date    GERD (gastroesophageal reflux disease)     Hyperlipidemia     not on rx    Hypertension     managed per pcp    Kidney stone     Major depressive disorder, recurrent episode, mild ([address] 10/21/2015    Palmira Hollingsworth    Obesity, Class III, BMI 40-49.9 (morbid obesity) [address] [address]     TRISHA (obstructive sleep apnea)     Saw Dr. Cindy Sosa for clearance for surgery    Pre-diabetes     diet control    Presence of tooth-root and mandibular implants     all top teeth    Tachycardia     per pt.  Saw Dr. Deirdre Orosco 2020    Wears contact lenses     Instructed not to wear contacts for surgery      Past Surgical History:   Procedure Laterality Date     SECTION      x1    SLEEVE GASTRECTOMY  2021    LAPAROSCOPIC ROBOTIC GASTRECTOMY SLEEVE, HIATAL HERNIA REPAIR, EGD     SLEEVE GASTRECTOMY N/A 2021    XI LAPAROSCOPIC ROBOTIC GASTRECTOMY SLEEVE, HIATAL HERNIA REPAIR, EGD - GI UNIT SCHEDULED. performed by Héctor Jesus DO at [address] ENDOSCOPY N/A 2021    EGD BIOPSY performed by Héctor Jesus DO at \[facility]\"" Endoscopy       Family History   Problem Relation Age of Onset    Hypertension Mother     Asthma Mother     Hypertension Maternal Aunt     Asthma Maternal Uncle     Hypertension Maternal Uncle     Arthritis Maternal Grandmother     Hypertension Maternal Grandmother     Stroke Maternal Grandmother     Breast Cancer Neg Hx     Cancer Neg Hx     Colon Cancer Neg Hx     Diabetes Neg Hx     Eclampsia Neg Hx     Ovarian Cancer Neg Hx      Labor Neg Hx     Spont Abortions Neg Hx        Social History     Tobacco Use    Smoking status: Never Smoker    Smokeless tobacco: Never Used   Substance Use Topics    Alcohol use: No     Alcohol/week: 0.0 standard drinks      Current Outpatient Medications   Medication Sig Dispense Refill    losartan (COZAAR) 100 MG tablet       montelukast (SINGULAIR) 10 MG tablet TAKE 1 TABLET BY MOUTH DAILY 90 tablet 1    potassium chloride (KLOR-CON M) 20 MEQ extended release tablet TAKE TWO (2) TABLETS BY MOUTH ONCE DAILY 60 tablet 5    amLODIPine (NORVASC) 10 MG tablet Take 1 tablet by mouth daily 90 tablet 1    ferrous sulfate (IRON 325) 325 (65 Fe) MG tablet Take 1 tablet by mouth daily (with breakfast) 90 tablet 1    Multiple Vitamins-Minerals (MULTIVITAMIN WITH MINERALS) tablet Take 2 tablets by mouth daily 30 tablet 0    Metoprolol Succinate 25 MG CS24 Take by mouth 1/2 tab daily per Dr. Betzy Wren       No current facility-administered medications for this visit. No Known Allergies      HPI:     HPI    Presents today c/o possible yeast infection of abdominal skin s/p bariatric surgery   Symptoms are recurrent   Admits to associated itching & excessive moisture   Has tried Telcum powder & deodorant with relief   Wondering about FMLA for this     C/o back pain  Ongoing for 6 months , no injury   Symptoms are gradually worsening  Symptoms are intermittent  L lower back   Described as sharp, stabbing  Symptoms can last hours   Pain can radiate into RLE  Associated leg pain & intermittent numbness & tingling   Declines any bowel or bladder incontinence or perianal numbness  Aggravated by laying flat & certain positions  Has tried Ibuprofen & heat with relief     H/o HTN  Taking losartan, amlodipine , metoprolol   Reports compliance with medications   BP typically run's 120-140 / 80-90's on average     Health Maintenance:      Subjective:     Review of Systems   Constitutional: Positive for fatigue. Negative for appetite change, chills, diaphoresis and fever. HENT: Negative.     Eyes: Negative. Respiratory: Negative. Cardiovascular: Negative. Gastrointestinal: Negative. Negative for diarrhea, nausea and vomiting. Endocrine: Negative. Genitourinary: Negative for difficulty urinating, dysuria, frequency and hematuria. Musculoskeletal: Positive for back pain. Skin: Negative. Negative for color change, pallor, rash and wound. Allergic/Immunologic: Negative. Neurological: Positive for numbness. Negative for seizures, syncope, facial asymmetry and headaches. Hematological: Negative. Psychiatric/Behavioral: Negative. Objective:     Vitals:    05/09/22 1458   BP: (!) 154/100   Pulse: 104   Temp: 96.8 °F (36 °C)   SpO2: 100%     Vitals:    05/09/22 1519   BP: (!) 156/110   Pulse:    Temp:    SpO2:        Body mass index is 23.23 kg/m². Physical Exam  Constitutional:       General: She is not in acute distress. Appearance: Normal appearance. She is well-developed and normal weight. She is not ill-appearing, toxic-appearing or diaphoretic. HENT:      Head: Normocephalic and atraumatic. Right Ear: External ear normal.      Left Ear: External ear normal.      Nose: Nose normal.   Eyes:      General: No scleral icterus. Right eye: No discharge. Left eye: No discharge. Conjunctiva/sclera: Conjunctivae normal.      Pupils: Pupils are equal, round, and reactive to light. Neck:      Trachea: No tracheal deviation. Cardiovascular:      Rate and Rhythm: Normal rate and regular rhythm. Heart sounds: Normal heart sounds. No murmur heard. No friction rub. No gallop. Pulmonary:      Effort: Pulmonary effort is normal. No tachypnea, accessory muscle usage or respiratory distress. Breath sounds: Normal breath sounds. No stridor. No decreased breath sounds, wheezing, rhonchi or rales. Abdominal:      Palpations: Abdomen is soft. Musculoskeletal:         General: No tenderness or deformity.       Cervical back: Normal range of motion and neck supple. Lumbar back: Spasms and bony tenderness present. No swelling, edema, deformity, signs of trauma or lacerations. Normal range of motion. Positive right straight leg raise test.        Back:    Skin:     General: Skin is warm and dry. Coloration: Skin is not pale. Findings: No erythema or rash. Neurological:      Mental Status: She is alert and oriented to person, place, and time. GCS: GCS eye subscore is 4. GCS verbal subscore is 5. GCS motor subscore is 6. Cranial Nerves: No cranial nerve deficit. Sensory: Sensation is intact. Motor: Motor function is intact. No seizure activity. Gait: Gait is intact. Gait normal.      Deep Tendon Reflexes: Reflexes are normal and symmetric. Reflex Scores:       Patellar reflexes are 2+ on the right side and 2+ on the left side. Psychiatric:         Speech: Speech normal.         Behavior: Behavior normal.         Thought Content: Thought content normal.         Judgment: Judgment normal.           Assessment:         1. Essential hypertension    2. Chronic bilateral low back pain with sciatica, sciatica laterality unspecified    3. Hypokalemia    4. Immunity status testing    5. H/O dermatitis        Plan:     1. Essential hypertension    - carvedilol (COREG) 3.125 MG tablet; Take 1 tablet by mouth 2 times daily  Dispense: 60 tablet; Refill: 0  - CBC with Auto Differential; Future  - Comprehensive Metabolic Panel; Future  - TSH with Reflex; Future    BP elevated above goal  Continue amlodipine, losartan  STOP metoprolol   START carvedilol, side effects discussed  Follow-up 3 weeks , dose adjustment pending response     2. Chronic bilateral low back pain with sciatica, sciatica laterality unspecified    - XR LUMBAR SPINE (2-3 VIEWS); Future  - XR THORACIC SPINE (3 VIEWS); Future  - Ambulatory referral to Physical Therapy  - tiZANidine (ZANAFLEX) 4 MG tablet;  Take 0.5-1 tablets by mouth every 8 hours as needed (muscle spasms)  Dispense: 10 tablet; Refill: 0    Imaging as ordered  PT referral  Symptomatic treatment  Follow-up after PT if symptoms persist     3. Hypokalemia    - Comprehensive Metabolic Panel; Future    4. Immunity status testing    - Rubella antibody, IgG; Future  - Rubeola Antibody, IgG; Future  - Mumps Antibody, IgG; Future    5. History of dermatitis    No current rash   Recommended keeping area clean & dry & avoidance of excessive moisture   This condition does not qualify for FMLA     Tdap at follow-up visit     Discussed use, benefit, and side effects of prescribed medications. All patient questions answered. Pt voiced understanding. Reviewed health maintenance. Instructed to continue current medications, diet and exercise. Patient agreedwith treatment plan. Follow up as directed.      Electronically signed by LUZ Su CNP on 5/9/2022

## 2022-05-09 NOTE — PATIENT INSTRUCTIONS
STOP metoprolol  START carvedilol  Continue home BP monitoring for goal < 140/90   Follow-up 3 weeks     Patient Education        DASH Diet: Care Instructions  Your Care Instructions     The DASH diet is an eating plan that can help lower your blood pressure. DASH stands for Dietary Approaches to Stop Hypertension. Hypertension is high bloodpressure. The DASH diet focuses on eating foods that are high in calcium, potassium, and magnesium. These nutrients can lower blood pressure. The foods that are highest in these nutrients are fruits, vegetables, low-fat dairy products, nuts, seeds, and legumes. But taking calcium, potassium, and magnesium supplements instead of eating foods that are high in those nutrients does not have the same effect. The DASH diet also includes whole grains, fish, and poultry. The DASH diet is one of several lifestyle changes your doctor may recommend to lower your high blood pressure. Your doctor may also want you to decrease the amount of sodium in your diet. Lowering sodium while following the DASH dietcan lower blood pressure even further than just the DASH diet alone. Follow-up care is a key part of your treatment and safety. Be sure to make and go to all appointments, and call your doctor if you are having problems. It's also a good idea to know your test results and keep alist of the medicines you take. How can you care for yourself at home? Following the DASH diet   Eat 4 to 5 servings of fruit each day. A serving is 1 medium-sized piece of fruit, ½ cup chopped or canned fruit, 1/4 cup dried fruit, or 4 ounces (½ cup) of fruit juice. Choose fruit more often than fruit juice.  Eat 4 to 5 servings of vegetables each day. A serving is 1 cup of lettuce or raw leafy vegetables, ½ cup of chopped or cooked vegetables, or 4 ounces (½ cup) of vegetable juice. Choose vegetables more often than vegetable juice.  Get 2 to 3 servings of low-fat and fat-free dairy each day.  A serving is 8 ounces of milk, 1 cup of yogurt, or 1 ½ ounces of cheese.  Eat 6 to 8 servings of grains each day. A serving is 1 slice of bread, 1 ounce of dry cereal, or ½ cup of cooked rice, pasta, or cooked cereal. Try to choose whole-grain products as much as possible.  Limit lean meat, poultry, and fish to 2 servings each day. A serving is 3 ounces, about the size of a deck of cards.  Eat 4 to 5 servings of nuts, seeds, and legumes (cooked dried beans, lentils, and split peas) each week. A serving is 1/3 cup of nuts, 2 tablespoons of seeds, or ½ cup of cooked beans or peas.  Limit fats and oils to 2 to 3 servings each day. A serving is 1 teaspoon of vegetable oil or 2 tablespoons of salad dressing.  Limit sweets and added sugars to 5 servings or less a week. A serving is 1 tablespoon jelly or jam, ½ cup sorbet, or 1 cup of lemonade.  Eat less than 2,300 milligrams (mg) of sodium a day. If you limit your sodium to 1,500 mg a day, you can lower your blood pressure even more.  Be aware that all of these are the suggested number of servings for people who eat 1,800 to 2,000 calories a day. Your recommended number of servings may be different if you need more or fewer calories. Tips for success   Start small. Do not try to make dramatic changes to your diet all at once. You might feel that you are missing out on your favorite foods and then be more likely to not follow the plan. Make small changes, and stick with them. Once those changes become habit, add a few more changes.  Try some of the following:  ? Make it a goal to eat a fruit or vegetable at every meal and at snacks. This will make it easy to get the recommended amount of fruits and vegetables each day. ? Try yogurt topped with fruit and nuts for a snack or healthy dessert. ? Add lettuce, tomato, cucumber, and onion to sandwiches. ? Combine a ready-made pizza crust with low-fat mozzarella cheese and lots of vegetable toppings.  Try using tomatoes, squash, spinach, broccoli, carrots, cauliflower, and onions. ? Have a variety of cut-up vegetables with a low-fat dip as an appetizer instead of chips and dip. ? Sprinkle sunflower seeds or chopped almonds over salads. Or try adding chopped walnuts or almonds to cooked vegetables. ? Try some vegetarian meals using beans and peas. Add garbanzo or kidney beans to salads. Make burritos and tacos with mashed barrera beans or black beans. Where can you learn more? Go to https://Velox Semiconductorpepiceweb.PrognosDx Health. org and sign in to your EchoFirst account. Enter S430 in the LinkStorm box to learn more about \"DASH Diet: Care Instructions. \"     If you do not have an account, please click on the \"Sign Up Now\" link. Current as of: January 10, 2022               Content Version: 13.2  © 2006-2022 [employer]. Care instructions adapted under license by [facility] ([facility]). If you have questions about a medical condition or this instruction, always ask your healthcare professional. [place] [address] any warranty or liability for your use of this information. Patient Education        High Blood Pressure: Care Instructions  Overview     It's normal for blood pressure to go up and down throughout the day. But if it stays up, you have high blood pressure. Another name for high blood pressure ishypertension. Despite what a lot of people think, high blood pressure usually doesn't cause headaches or make you feel dizzy or lightheaded. It usually has no symptoms. But it does increase your risk of stroke, heart attack, and other problems. You and your doctor will talk about your risks of these problems based on yourblood pressure. Your doctor will give you a goal for your blood pressure. Your goal will bebased on your health and your age. Lifestyle changes, such as eating healthy and being active, are always important to help lower blood pressure.  You might also take medicine to reachyour blood pressure goal.  Follow-up care is a key part of your treatment and safety. Be sure to make and go to all appointments, and call your doctor if you are having problems. It's also a good idea to know your test results and keep alist of the medicines you take. How can you care for yourself at home? Medical treatment   If you stop taking your medicine, your blood pressure will go back up. You may take one or more types of medicine to lower your blood pressure. Be safe with medicines. Take your medicine exactly as prescribed. Call your doctor if you think you are having a problem with your medicine.  Talk to your doctor before you start taking aspirin every day. Aspirin can help certain people lower their risk of a heart attack or stroke. But taking aspirin isn't right for everyone, because it can cause serious bleeding.  See your doctor regularly. You may need to see the doctor more often at first or until your blood pressure comes down.  If you are taking blood pressure medicine, talk to your doctor before you take decongestants or anti-inflammatory medicine, such as ibuprofen. Some of these medicines can raise blood pressure.  Learn how to check your blood pressure at home. Lifestyle changes   Stay at a healthy weight. This is especially important if you put on weight around the waist. Losing even 10 pounds can help you lower your blood pressure.  If your doctor recommends it, get more exercise. Walking is a good choice. Bit by bit, increase the amount you walk every day. Try for at least 30 minutes on most days of the week. You also may want to swim, bike, or do other activities.  Avoid or limit alcohol. Talk to your doctor about whether you can drink any alcohol.  Try to limit how much sodium you eat to less than 2,300 milligrams (mg) a day. Your doctor may ask you to try to eat less than 1,500 mg a day.    Eat plenty of fruits (such as bananas and oranges), vegetables, legumes, whole grains, and low-fat dairy products.  Lower the amount of saturated fat in your diet. Saturated fat is found in animal products such as milk, cheese, and meat. Limiting these foods may help you lose weight and also lower your risk for heart disease.  Do not smoke. Smoking increases your risk for heart attack and stroke. If you need help quitting, talk to your doctor about stop-smoking programs and medicines. These can increase your chances of quitting for good. When should you call for help? Call 911  anytime you think you may need emergency care. This may mean having symptoms that suggest that your blood pressure is causing a serious heart or blood vessel problem. Your blood pressure may be over 180/120. For example, call 911 if:     You have symptoms of a heart attack. These may include:  ? Chest pain or pressure, or a strange feeling in the chest.  ? Sweating. ? Shortness of breath. ? Nausea or vomiting. ? Pain, pressure, or a strange feeling in the back, neck, jaw, or upper belly or in one or both shoulders or arms. ? Lightheadedness or sudden weakness. ? A fast or irregular heartbeat.      You have symptoms of a stroke. These may include:  ? Sudden numbness, tingling, weakness, or loss of movement in your face, arm, or leg, especially on only one side of your body. ? Sudden vision changes. ? Sudden trouble speaking. ? Sudden confusion or trouble understanding simple statements. ? Sudden problems with walking or balance. ? A sudden, severe headache that is different from past headaches.      You have severe back or belly pain. Do not wait until your blood pressure comes down on its own. Get help right away. Call your doctor now or seek immediate care if:     Your blood pressure is much higher than normal (such as 180/120 or higher), but you don't have symptoms.      You think high blood pressure is causing symptoms, such as:  ? Severe headache.  ? Blurry vision.    Watch closely for changes in your health, and be sure to contact your doctor if:     Your blood pressure measures higher than your doctor recommends at least 2 times. That means the top number is higher or the bottom number is higher, or both.      You think you may be having side effects from your blood pressure medicine. Where can you learn more? Go to https://chpepiceweb.ZexSports.com. org and sign in to your ReliSen account. Enter V202 in the Apcera box to learn more about \"High Blood Pressure: Care Instructions. \"     If you do not have an account, please click on the \"Sign Up Now\" link. Current as of: January 10, 2022               Content Version: 13.2  © 2006-2022 [employer]. Care instructions adapted under license by [facility] ([facility]). If you have questions about a medical condition or this instruction, always ask your healthcare professional. [place] [address] any warranty or liability for your use of this information. Patient Education        Learning About High Blood Pressure  What is high blood pressure? Blood pressure is a measure of how hard the blood pushes against the walls of your arteries. It's normal for blood pressure to go up and down throughout the day. But if it stays up, you have high blood pressure. Another name for highblood pressure is hypertension. Two numbers tell you your blood pressure. The first number is the systolic pressure (top number). It shows how hard the blood pushes when your heart is pumping. The second number is the diastolic pressure (bottom number). It shows how hard the blood pushes between heartbeats, when your heart is relaxed andfilling with blood. Your doctor will give you a goal for your blood pressure based on your health and your age. High blood pressure (hypertension) means that the top numberstays high, or the bottom number stays high, or both.   High blood pressure increases the risk of stroke, heart attack, and otherproblems. What happens when you have high blood pressure?  Blood flows through your arteries with too much force. Over time, this can damage the heart and the walls of your arteries. But you can't feel it. High blood pressure usually doesn't cause symptoms.  High blood pressure makes your heart work harder. And that can lead to heart failure, which means your heart doesn't pump as much blood as your body needs.  Fat and calcium start to build up in your arteries. This buildup is called hardening of the arteries. It can cause many problems including a heart attack and stroke.  Arteries also carry blood and oxygen to organs like your eyes, kidneys, and brain. If high blood pressure damages those arteries, it can lead to vision loss, kidney disease, stroke, and a higher risk of dementia. How can you prevent high blood pressure?  Stay at a healthy weight.  Try to limit how much sodium you eat to less than 2,300 milligrams (mg) a day. If you limit your sodium to 1,500 mg a day, you can lower your blood pressure even more. ? Buy foods that are labeled \"unsalted,\" \"sodium-free,\" or \"low-sodium. \" Foods labeled \"reduced-sodium\" and \"light sodium\" may still have too much sodium. ? Flavor your food with garlic, lemon juice, onion, vinegar, herbs, and spices instead of salt. Do not use soy sauce, steak sauce, onion salt, garlic salt, mustard, or ketchup on your food. ? Use less salt (or none) when recipes call for it. You can often use half the salt a recipe calls for without losing flavor.  Be physically active. Get at least 30 minutes of exercise on most days of the week. Walking is a good choice. You also may want to do other activities, such as running, swimming, cycling, or playing tennis or team sports.  Limit alcohol to 2 drinks a day for men and 1 drink a day for women.  Eat plenty of fruits, vegetables, and low-fat dairy products. Eat less saturated and total fats.   How is high blood pressure treated?  Your doctor will suggest making lifestyle changes to help your heart. For example, your doctor may ask you to eat healthy foods, quit smoking, lose extra weight, and be more active.  If lifestyle changes don't help enough, your doctor may recommend that you take medicine.  When blood pressure is very high, medicines are needed to lower it. Follow-up care is a key part of your treatment and safety. Be sure to make and go to all appointments, and call your doctor if you are having problems. It's also a good idea to know your test results and keep alist of the medicines you take. Where can you learn more? Go to https://Digital LabpePersonal On Demandeb.ALICE App. org and sign in to your Identyx account. Enter P501 in the Cantargia box to learn more about \"Learning About High Blood Pressure. \"     If you do not have an account, please click on the \"Sign Up Now\" link. Current as of: January 10, 2022               Content Version: 13.2  © 2006-2022 Healthwise, Incorporated. Care instructions adapted under license by [facility] ([facility]). If you have questions about a medical condition or this instruction, always ask your healthcare professional. [place] [address] any warranty or liability for your use of this information.

## 2022-05-16 ENCOUNTER — TELEPHONE (OUTPATIENT)
Dept: FAMILY MEDICINE CLINIC | Age: 42
End: 2022-05-16

## 2022-05-16 NOTE — TELEPHONE ENCOUNTER
Please call patient for an update on her BP log since she's been on carvedilol instead of metoprolol . She should also still be taking losartan 100 mg & amlodipine 10 mg.

## 2022-06-04 ENCOUNTER — HOSPITAL ENCOUNTER (OUTPATIENT)
Age: 42
Discharge: HOME | End: 2022-06-04
Payer: COMMERCIAL

## 2022-06-04 DIAGNOSIS — E87.6 HYPOKALEMIA: ICD-10-CM

## 2022-06-04 DIAGNOSIS — E78.5 HYPERLIPIDEMIA, UNSPECIFIED HYPERLIPIDEMIA TYPE: ICD-10-CM

## 2022-06-04 DIAGNOSIS — I10 ESSENTIAL HYPERTENSION: ICD-10-CM

## 2022-06-04 DIAGNOSIS — E78.2 MIXED HYPERLIPIDEMIA: ICD-10-CM

## 2022-06-04 DIAGNOSIS — Z98.84 S/P LAPAROSCOPIC SLEEVE GASTRECTOMY: ICD-10-CM

## 2022-06-04 DIAGNOSIS — Z01.84 IMMUNITY STATUS TESTING: ICD-10-CM

## 2022-06-04 LAB
ABSOLUTE EOS #: 0.09 K/UL (ref 0–0.44)
ABSOLUTE EOS #: 0.09 K/UL (ref 0–0.44)
ABSOLUTE IMMATURE GRANULOCYTE: <0.03 K/UL (ref 0–0.3)
ABSOLUTE IMMATURE GRANULOCYTE: <0.03 K/UL (ref 0–0.3)
ABSOLUTE LYMPH #: 1.39 K/UL (ref 1.1–3.7)
ABSOLUTE LYMPH #: 1.39 K/UL (ref 1.1–3.7)
ABSOLUTE MONO #: 0.42 K/UL (ref 0.1–1.2)
ABSOLUTE MONO #: 0.42 K/UL (ref 0.1–1.2)
ALBUMIN SERPL-MCNC: 4.2 G/DL (ref 3.5–5.2)
ALBUMIN SERPL-MCNC: 4.2 G/DL (ref 3.5–5.2)
ALBUMIN/GLOBULIN RATIO: 1.3 (ref 1–2.5)
ALBUMIN/GLOBULIN RATIO: 1.3 (ref 1–2.5)
ALP BLD-CCNC: 43 U/L (ref 35–104)
ALP BLD-CCNC: 43 U/L (ref 35–104)
ALT SERPL-CCNC: 8 U/L (ref 5–33)
ALT SERPL-CCNC: 8 U/L (ref 5–33)
ANION GAP SERPL CALCULATED.3IONS-SCNC: 12 MMOL/L (ref 9–17)
ANION GAP SERPL CALCULATED.3IONS-SCNC: 12 MMOL/L (ref 9–17)
AST SERPL-CCNC: 13 U/L
AST SERPL-CCNC: 13 U/L
BASOPHILS # BLD: 1 % (ref 0–2)
BASOPHILS # BLD: 1 % (ref 0–2)
BASOPHILS ABSOLUTE: 0.03 K/UL (ref 0–0.2)
BASOPHILS ABSOLUTE: 0.03 K/UL (ref 0–0.2)
BILIRUB SERPL-MCNC: 0.43 MG/DL (ref 0.3–1.2)
BILIRUB SERPL-MCNC: 0.43 MG/DL (ref 0.3–1.2)
BUN BLDV-MCNC: 11 MG/DL (ref 6–20)
BUN BLDV-MCNC: 11 MG/DL (ref 6–20)
CALCIUM SERPL-MCNC: 9.2 MG/DL (ref 8.6–10.4)
CALCIUM SERPL-MCNC: 9.2 MG/DL (ref 8.6–10.4)
CHLORIDE BLD-SCNC: 101 MMOL/L (ref 98–107)
CHLORIDE BLD-SCNC: 101 MMOL/L (ref 98–107)
CHOLESTEROL/HDL RATIO: 3.5
CHOLESTEROL/HDL RATIO: 3.5
CHOLESTEROL: 214 MG/DL
CHOLESTEROL: 214 MG/DL
CO2: 25 MMOL/L (ref 20–31)
CO2: 25 MMOL/L (ref 20–31)
CREAT SERPL-MCNC: 0.7 MG/DL (ref 0.5–0.9)
CREAT SERPL-MCNC: 0.7 MG/DL (ref 0.5–0.9)
EOSINOPHILS RELATIVE PERCENT: 2 % (ref 1–4)
EOSINOPHILS RELATIVE PERCENT: 2 % (ref 1–4)
FERRITIN: 8 NG/ML (ref 13–150)
FOLATE: 15.2 NG/ML
GFR AFRICAN AMERICAN: >60 ML/MIN
GFR AFRICAN AMERICAN: >60 ML/MIN
GFR NON-AFRICAN AMERICAN: >60 ML/MIN
GFR NON-AFRICAN AMERICAN: >60 ML/MIN
GFR SERPL CREATININE-BSD FRML MDRD: NORMAL ML/MIN/{1.73_M2}
GFR SERPL CREATININE-BSD FRML MDRD: NORMAL ML/MIN/{1.73_M2}
GLUCOSE BLD-MCNC: 89 MG/DL (ref 70–99)
GLUCOSE BLD-MCNC: 89 MG/DL (ref 70–99)
HCT VFR BLD CALC: 31.6 % (ref 36.3–47.1)
HCT VFR BLD CALC: 31.6 % (ref 36.3–47.1)
HDLC SERPL-MCNC: 62 MG/DL
HDLC SERPL-MCNC: 62 MG/DL
HEMOGLOBIN: 9.4 G/DL (ref 11.9–15.1)
HEMOGLOBIN: 9.4 G/DL (ref 11.9–15.1)
IMMATURE GRANULOCYTES: 0 %
IMMATURE GRANULOCYTES: 0 %
IRON SATURATION: 10 % (ref 20–55)
IRON: 34 UG/DL (ref 37–145)
LDL CHOLESTEROL: 142 MG/DL (ref 0–130)
LDL CHOLESTEROL: 142 MG/DL (ref 0–130)
LYMPHOCYTES # BLD: 32 % (ref 24–43)
LYMPHOCYTES # BLD: 32 % (ref 24–43)
MAGNESIUM: 1.9 MG/DL (ref 1.6–2.6)
MCH RBC QN AUTO: 25.3 PG (ref 25.2–33.5)
MCH RBC QN AUTO: 25.3 PG (ref 25.2–33.5)
MCHC RBC AUTO-ENTMCNC: 29.7 G/DL (ref 28.4–34.8)
MCHC RBC AUTO-ENTMCNC: 29.7 G/DL (ref 28.4–34.8)
MCV RBC AUTO: 84.9 FL (ref 82.6–102.9)
MCV RBC AUTO: 84.9 FL (ref 82.6–102.9)
MONOCYTES # BLD: 10 % (ref 3–12)
MONOCYTES # BLD: 10 % (ref 3–12)
NRBC AUTOMATED: 0 PER 100 WBC
NRBC AUTOMATED: 0 PER 100 WBC
PDW BLD-RTO: 13 % (ref 11.8–14.4)
PDW BLD-RTO: 13 % (ref 11.8–14.4)
PLATELET # BLD: 428 K/UL (ref 138–453)
PLATELET # BLD: 428 K/UL (ref 138–453)
PMV BLD AUTO: 9.1 FL (ref 8.1–13.5)
PMV BLD AUTO: 9.1 FL (ref 8.1–13.5)
POTASSIUM SERPL-SCNC: 3.8 MMOL/L (ref 3.7–5.3)
POTASSIUM SERPL-SCNC: 3.8 MMOL/L (ref 3.7–5.3)
PTH INTACT: 57.98 PG/ML (ref 15–65)
RBC # BLD: 3.72 M/UL (ref 3.95–5.11)
RBC # BLD: 3.72 M/UL (ref 3.95–5.11)
RUBV IGG SER QL: 12.9 IU/ML
SEG NEUTROPHILS: 55 % (ref 36–65)
SEG NEUTROPHILS: 55 % (ref 36–65)
SEGMENTED NEUTROPHILS ABSOLUTE COUNT: 2.37 K/UL (ref 1.5–8.1)
SEGMENTED NEUTROPHILS ABSOLUTE COUNT: 2.37 K/UL (ref 1.5–8.1)
SODIUM BLD-SCNC: 138 MMOL/L (ref 135–144)
SODIUM BLD-SCNC: 138 MMOL/L (ref 135–144)
TOTAL IRON BINDING CAPACITY: 343 UG/DL (ref 250–450)
TOTAL PROTEIN: 7.5 G/DL (ref 6.4–8.3)
TOTAL PROTEIN: 7.5 G/DL (ref 6.4–8.3)
TRIGL SERPL-MCNC: 48 MG/DL
TRIGL SERPL-MCNC: 48 MG/DL
TSH SERPL DL<=0.05 MIU/L-ACNC: 0.47 UIU/ML (ref 0.3–5)
TSH SERPL DL<=0.05 MIU/L-ACNC: 0.47 UIU/ML (ref 0.3–5)
UNSATURATED IRON BINDING CAPACITY: 309 UG/DL (ref 112–347)
VITAMIN B-12: 1111 PG/ML (ref 232–1245)
VITAMIN D 25-HYDROXY: 29.7 NG/ML
WBC # BLD: 4.3 K/UL (ref 3.5–11.3)
WBC # BLD: 4.3 K/UL (ref 3.5–11.3)

## 2022-06-04 PROCEDURE — 84590 ASSAY OF VITAMIN A: CPT

## 2022-06-04 PROCEDURE — 83036 HEMOGLOBIN GLYCOSYLATED A1C: CPT

## 2022-06-04 PROCEDURE — 82306 VITAMIN D 25 HYDROXY: CPT

## 2022-06-04 PROCEDURE — 82746 ASSAY OF FOLIC ACID SERUM: CPT

## 2022-06-04 PROCEDURE — 82607 VITAMIN B-12: CPT

## 2022-06-04 PROCEDURE — 84443 ASSAY THYROID STIM HORMONE: CPT

## 2022-06-04 PROCEDURE — 83550 IRON BINDING TEST: CPT

## 2022-06-04 PROCEDURE — 80053 COMPREHEN METABOLIC PANEL: CPT

## 2022-06-04 PROCEDURE — 86735 MUMPS ANTIBODY: CPT

## 2022-06-04 PROCEDURE — 83735 ASSAY OF MAGNESIUM: CPT

## 2022-06-04 PROCEDURE — 83970 ASSAY OF PARATHORMONE: CPT

## 2022-06-04 PROCEDURE — 80061 LIPID PANEL: CPT

## 2022-06-04 PROCEDURE — 85025 COMPLETE CBC W/AUTO DIFF WBC: CPT

## 2022-06-04 PROCEDURE — 83540 ASSAY OF IRON: CPT

## 2022-06-04 PROCEDURE — 82728 ASSAY OF FERRITIN: CPT

## 2022-06-04 PROCEDURE — 86765 RUBEOLA ANTIBODY: CPT

## 2022-06-04 PROCEDURE — 36415 COLL VENOUS BLD VENIPUNCTURE: CPT

## 2022-06-04 PROCEDURE — 84630 ASSAY OF ZINC: CPT

## 2022-06-04 PROCEDURE — 86762 RUBELLA ANTIBODY: CPT

## 2022-06-04 PROCEDURE — 84425 ASSAY OF VITAMIN B-1: CPT

## 2022-06-05 LAB
ESTIMATED AVERAGE GLUCOSE: 103 MG/DL
HBA1C MFR BLD: 5.2 % (ref 4–6)

## 2022-06-06 DIAGNOSIS — E55.9 VITAMIN D DEFICIENCY: Primary | ICD-10-CM

## 2022-06-06 LAB
MEASLES ANTIBODY IGG: 1.01
MUV IGG SER QL: 1.82

## 2022-06-06 RX ORDER — ERGOCALCIFEROL 1.25 MG/1
50000 CAPSULE ORAL WEEKLY
Qty: 8 CAPSULE | Refills: 0 | Status: SHIPPED | OUTPATIENT
Start: 2022-06-06 | End: 2022-07-26

## 2022-06-07 ENCOUNTER — OFFICE VISIT (OUTPATIENT)
Dept: FAMILY MEDICINE CLINIC | Age: 42
End: 2022-06-07
Payer: COMMERCIAL

## 2022-06-07 VITALS
BODY MASS INDEX: 22.93 KG/M2 | TEMPERATURE: 97.4 F | DIASTOLIC BLOOD PRESSURE: 114 MMHG | SYSTOLIC BLOOD PRESSURE: 162 MMHG | OXYGEN SATURATION: 97 % | HEART RATE: 90 BPM | WEIGHT: 137.8 LBS

## 2022-06-07 DIAGNOSIS — Z23 IMMUNIZATION DUE: ICD-10-CM

## 2022-06-07 DIAGNOSIS — Z98.84 S/P LAPAROSCOPIC SLEEVE GASTRECTOMY: ICD-10-CM

## 2022-06-07 DIAGNOSIS — I10 ESSENTIAL HYPERTENSION: Primary | ICD-10-CM

## 2022-06-07 DIAGNOSIS — D50.8 OTHER IRON DEFICIENCY ANEMIA: ICD-10-CM

## 2022-06-07 LAB
RETINYL PALMITATE: 0.03 MG/L (ref 0–0.1)
VITAMIN A LEVEL: 0.44 MG/L (ref 0.3–1.2)
VITAMIN A, INTERP: NORMAL
VITAMIN B1 WHOLE BLOOD: 42 NMOL/L (ref 70–180)

## 2022-06-07 PROCEDURE — 90715 TDAP VACCINE 7 YRS/> IM: CPT | Performed by: NURSE PRACTITIONER

## 2022-06-07 PROCEDURE — 90707 MMR VACCINE SC: CPT | Performed by: NURSE PRACTITIONER

## 2022-06-07 PROCEDURE — G8427 DOCREV CUR MEDS BY ELIG CLIN: HCPCS | Performed by: NURSE PRACTITIONER

## 2022-06-07 PROCEDURE — G8420 CALC BMI NORM PARAMETERS: HCPCS | Performed by: NURSE PRACTITIONER

## 2022-06-07 PROCEDURE — 1036F TOBACCO NON-USER: CPT | Performed by: NURSE PRACTITIONER

## 2022-06-07 PROCEDURE — 99214 OFFICE O/P EST MOD 30 MIN: CPT | Performed by: NURSE PRACTITIONER

## 2022-06-07 RX ORDER — CARVEDILOL 6.25 MG/1
6.25 TABLET ORAL 2 TIMES DAILY
Qty: 60 TABLET | Refills: 1 | Status: SHIPPED | OUTPATIENT
Start: 2022-06-07 | End: 2022-06-14

## 2022-06-07 ASSESSMENT — PATIENT HEALTH QUESTIONNAIRE - PHQ9
SUM OF ALL RESPONSES TO PHQ9 QUESTIONS 1 & 2: 0
SUM OF ALL RESPONSES TO PHQ QUESTIONS 1-9: 0
2. FEELING DOWN, DEPRESSED OR HOPELESS: 0
1. LITTLE INTEREST OR PLEASURE IN DOING THINGS: 0

## 2022-06-07 ASSESSMENT — ENCOUNTER SYMPTOMS
BLOOD IN STOOL: 0
DIARRHEA: 0
RESPIRATORY NEGATIVE: 1
VOMITING: 0
COLOR CHANGE: 0
COUGH: 0
EYES NEGATIVE: 1
GASTROINTESTINAL NEGATIVE: 1
ALLERGIC/IMMUNOLOGIC NEGATIVE: 1
NAUSEA: 0
CHEST TIGHTNESS: 0
WHEEZING: 0
SHORTNESS OF BREATH: 0
ANAL BLEEDING: 0

## 2022-06-07 NOTE — PROGRESS NOTES
[facility]  [address] [facility]  Dept: [phone]    Anabella Charles is a 35 y.o. female who presents today for her medical conditions/complaintsas noted below. Anabella Charles is here today c/o Hypertension and Discuss Labs    Past Medical History:   Diagnosis Date    GERD (gastroesophageal reflux disease)     Hyperlipidemia     not on rx    Hypertension     managed per pcp    Kidney stone     Major depressive disorder, recurrent episode, mild ([facility] [address] 10/21/2015    Palmira Hollingsworth    Obesity, Class III, BMI 40-49.9 (morbid obesity) ([facility] [address]     TRISHA (obstructive sleep apnea)     Saw Dr. Ronnie Browne for clearance for surgery    Pre-diabetes     diet control    Presence of tooth-root and mandibular implants     all top teeth    Tachycardia     per pt.  Saw Dr. Tejal Fernandez 2020    Wears contact lenses     Instructed not to wear contacts for surgery      Past Surgical History:   Procedure Laterality Date     SECTION      x1    SLEEVE GASTRECTOMY  2021    LAPAROSCOPIC ROBOTIC GASTRECTOMY SLEEVE, HIATAL HERNIA REPAIR, EGD     SLEEVE GASTRECTOMY N/A 2021    XI LAPAROSCOPIC ROBOTIC GASTRECTOMY SLEEVE, HIATAL HERNIA REPAIR, EGD - GI UNIT SCHEDULED. performed by Kerri Stock DO at [address] [place] [address] ENDOSCOPY N/A 2021    EGD BIOPSY performed by Kerri Stock DO at [facility]       Family History   Problem Relation Age of Onset    Hypertension Mother     Asthma Mother     Hypertension Maternal Aunt     Asthma Maternal Uncle     Hypertension Maternal Uncle     Arthritis Maternal Grandmother     Hypertension Maternal Grandmother     Stroke Maternal Grandmother     Breast Cancer Neg Hx     Cancer Neg Hx     Colon Cancer Neg Hx     Diabetes Neg Hx     Eclampsia Neg Hx     Ovarian Cancer Neg Hx      Labor Neg Hx     Spont Abortions Neg Hx        Social History     Tobacco Use    Smoking status: Never Smoker    Smokeless tobacco: Never Used   Substance Use Topics    Alcohol use: No     Alcohol/week: 0.0 standard drinks      Current Outpatient Medications   Medication Sig Dispense Refill    vitamin D (ERGOCALCIFEROL) 1.25 MG (49891 UT) CAPS capsule Take 1 capsule by mouth once a week for 8 doses 8 capsule 0    tiZANidine (ZANAFLEX) 4 MG tablet Take 0.5-1 tablets by mouth every 8 hours as needed (muscle spasms) 10 tablet 0    carvedilol (COREG) 3.125 MG tablet Take 1 tablet by mouth 2 times daily 60 tablet 0    losartan (COZAAR) 100 MG tablet       montelukast (SINGULAIR) 10 MG tablet TAKE 1 TABLET BY MOUTH DAILY 90 tablet 1    potassium chloride (KLOR-CON M) 20 MEQ extended release tablet TAKE TWO (2) TABLETS BY MOUTH ONCE DAILY 60 tablet 5    amLODIPine (NORVASC) 10 MG tablet Take 1 tablet by mouth daily 90 tablet 1    ferrous sulfate (IRON 325) 325 (65 Fe) MG tablet Take 1 tablet by mouth daily (with breakfast) 90 tablet 1    Multiple Vitamins-Minerals (MULTIVITAMIN WITH MINERALS) tablet Take 2 tablets by mouth daily 30 tablet 0     No current facility-administered medications for this visit. No Known Allergies      HPI:     HPI    Presents today c/o follow-up HTN / labs     H/o HTN   Taking losartan, amlodipine, carvedilol   Tolerating medications without side effects   BP running elevated at home (diastolic > 052)   Declines related headaches, dizziness, dyspnea, chest pain, or neurological sx  Intentional weight loss secondary to bariatric surgery    H/o anemia secondary to bariatric surgery   Hgb stable at 9.4   Hasn't been taking oral iron   Reports heavy menstrual cycles , currently on period   Doesn't follow w/ GYN   Reports associated fatigue without dizziness or dyspnea     Health Maintenance:      Subjective:     Review of Systems   Constitutional: Positive for fatigue. Negative for appetite change, chills, diaphoresis and fever. HENT: Negative. Eyes: Negative. Respiratory: Negative. Negative for cough, chest tightness, shortness of breath and wheezing. Cardiovascular: Negative. Gastrointestinal: Negative. Negative for anal bleeding, blood in stool, diarrhea, nausea and vomiting. Endocrine: Negative. Genitourinary: Positive for menstrual problem and vaginal bleeding. Negative for difficulty urinating and hematuria. Musculoskeletal: Negative. Skin: Negative. Negative for color change, pallor, rash and wound. Allergic/Immunologic: Negative. Neurological: Negative. Negative for dizziness, seizures, syncope, light-headedness and headaches. Hematological: Negative. Psychiatric/Behavioral: Negative. Objective:     Vitals:    06/07/22 1505   BP: (!) 174/114   Pulse: 90   Temp: 97.4 °F (36.3 °C)   SpO2: 97%     Vitals:    06/07/22 1515   BP: (!) 162/114   Pulse:    Temp:    SpO2:        Body mass index is 22.93 kg/m². Physical Exam  Constitutional:       General: She is not in acute distress. Appearance: Normal appearance. She is well-developed and normal weight. She is not ill-appearing, toxic-appearing or diaphoretic. HENT:      Head: Normocephalic and atraumatic. Right Ear: External ear normal.      Left Ear: External ear normal.      Nose: Nose normal.   Eyes:      General: No scleral icterus. Right eye: No discharge. Left eye: No discharge. Conjunctiva/sclera: Conjunctivae normal.      Pupils: Pupils are equal, round, and reactive to light. Neck:      Trachea: No tracheal deviation. Cardiovascular:      Rate and Rhythm: Normal rate and regular rhythm. Heart sounds: Normal heart sounds. No murmur heard. No friction rub. No gallop. Pulmonary:      Effort: Pulmonary effort is normal. No tachypnea, accessory muscle usage or respiratory distress. Breath sounds: Normal breath sounds. No stridor. No decreased breath sounds, wheezing, rhonchi or rales.    Abdominal:      Palpations: Abdomen is soft.   Musculoskeletal:         General: No tenderness or deformity. Normal range of motion. Cervical back: Normal range of motion and neck supple. Skin:     General: Skin is warm and dry. Coloration: Skin is not pale. Findings: No erythema or rash. Neurological:      Mental Status: She is alert and oriented to person, place, and time. GCS: GCS eye subscore is 4. GCS verbal subscore is 5. GCS motor subscore is 6. Gait: Gait is intact. Gait normal.   Psychiatric:         Speech: Speech normal.         Behavior: Behavior normal.         Thought Content: Thought content normal.         Judgment: Judgment normal.           Assessment:         1. Essential hypertension    2. Other iron deficiency anemia    3. S/P laparoscopic sleeve gastrectomy    4. Immunization due        Plan:     1. Essential hypertension    - carvedilol (COREG) 6.25 MG tablet; Take 1 tablet by mouth 2 times daily  Dispense: 60 tablet; Refill: 1    BP grossly elevated above goal  Increase dose as noted above  Continue other medications  Long term consequences of uncontrolled HTN discussed  Handouts given  F/u 1 week   Consideration for spirolactone if needed      Weight reduction - can decrease blood pressure by 5-20 points per 10 kg of weight loss   DASH Diet - consume diet rich in fruits, vegetables, and low-fat dairy products with a reduced content of saturated and total fat  Dietary sodium restriction - Reduce dietary sodium intake to no more than 100meq/day (2.4 g sodium)  Physical activity - Engage in regular aerobic physical activity routinely (150min/wk or 30 minutes most days) such as brisk walking, swimming, running, cycling  Moderate consumption of alcohol - limit to more than 2 drinks/day in most men and 1 drink/day in women   Smoking cessation     Credit - [employer]     2.  Other iron deficiency anemia    - Reina Madrigal MD, Hematology/Oncology, [facility]    Recommended Heme f/u for possible iron infusions     3. S/P laparoscopic sleeve gastrectomy    - Reina Gacria MD, Hematology/Oncology, [facility]    4. Immunization due    - MMR vaccine subcutaneous  - Tdap, BOOSTRIX, (age 8 yrs+), IM      Discussed use, benefit, and side effects of prescribed medications. All patient questions answered. Pt voiced understanding. Reviewed health maintenance. Instructed to continue current medications, diet and exercise. Patient agreedwith treatment plan. Follow up as directed.      Electronically signed by LUZ Weaver CNP on 6/7/2022

## 2022-06-07 NOTE — PATIENT INSTRUCTIONS
Patient Education        DASH Diet: Care Instructions  Your Care Instructions     The DASH diet is an eating plan that can help lower your blood pressure. DASH stands for Dietary Approaches to Stop Hypertension. Hypertension is high bloodpressure. The DASH diet focuses on eating foods that are high in calcium, potassium, and magnesium. These nutrients can lower blood pressure. The foods that are highest in these nutrients are fruits, vegetables, low-fat dairy products, nuts, seeds, and legumes. But taking calcium, potassium, and magnesium supplements instead of eating foods that are high in those nutrients does not have the same effect. The DASH diet also includes whole grains, fish, and poultry. The DASH diet is one of several lifestyle changes your doctor may recommend to lower your high blood pressure. Your doctor may also want you to decrease the amount of sodium in your diet. Lowering sodium while following the DASH dietcan lower blood pressure even further than just the DASH diet alone. Follow-up care is a key part of your treatment and safety. Be sure to make and go to all appointments, and call your doctor if you are having problems. It's also a good idea to know your test results and keep alist of the medicines you take. How can you care for yourself at home? Following the DASH diet   Eat 4 to 5 servings of fruit each day. A serving is 1 medium-sized piece of fruit, ½ cup chopped or canned fruit, 1/4 cup dried fruit, or 4 ounces (½ cup) of fruit juice. Choose fruit more often than fruit juice.  Eat 4 to 5 servings of vegetables each day. A serving is 1 cup of lettuce or raw leafy vegetables, ½ cup of chopped or cooked vegetables, or 4 ounces (½ cup) of vegetable juice. Choose vegetables more often than vegetable juice.  Get 2 to 3 servings of low-fat and fat-free dairy each day. A serving is 8 ounces of milk, 1 cup of yogurt, or 1 ½ ounces of cheese.  Eat 6 to 8 servings of grains each day.  A serving is 1 slice of bread, 1 ounce of dry cereal, or ½ cup of cooked rice, pasta, or cooked cereal. Try to choose whole-grain products as much as possible.  Limit lean meat, poultry, and fish to 2 servings each day. A serving is 3 ounces, about the size of a deck of cards.  Eat 4 to 5 servings of nuts, seeds, and legumes (cooked dried beans, lentils, and split peas) each week. A serving is 1/3 cup of nuts, 2 tablespoons of seeds, or ½ cup of cooked beans or peas.  Limit fats and oils to 2 to 3 servings each day. A serving is 1 teaspoon of vegetable oil or 2 tablespoons of salad dressing.  Limit sweets and added sugars to 5 servings or less a week. A serving is 1 tablespoon jelly or jam, ½ cup sorbet, or 1 cup of lemonade.  Eat less than 2,300 milligrams (mg) of sodium a day. If you limit your sodium to 1,500 mg a day, you can lower your blood pressure even more.  Be aware that all of these are the suggested number of servings for people who eat 1,800 to 2,000 calories a day. Your recommended number of servings may be different if you need more or fewer calories. Tips for success   Start small. Do not try to make dramatic changes to your diet all at once. You might feel that you are missing out on your favorite foods and then be more likely to not follow the plan. Make small changes, and stick with them. Once those changes become habit, add a few more changes.  Try some of the following:  ? Make it a goal to eat a fruit or vegetable at every meal and at snacks. This will make it easy to get the recommended amount of fruits and vegetables each day. ? Try yogurt topped with fruit and nuts for a snack or healthy dessert. ? Add lettuce, tomato, cucumber, and onion to sandwiches. ? Combine a ready-made pizza crust with low-fat mozzarella cheese and lots of vegetable toppings. Try using tomatoes, squash, spinach, broccoli, carrots, cauliflower, and onions. ?  Have a variety of cut-up vegetables with a low-fat dip as an appetizer instead of chips and dip. ? Sprinkle sunflower seeds or chopped almonds over salads. Or try adding chopped walnuts or almonds to cooked vegetables. ? Try some vegetarian meals using beans and peas. Add garbanzo or kidney beans to salads. Make burritos and tacos with mashed barrera beans or black beans. Where can you learn more? Go to https://ClassanapeSolveBoard.Siteheart. org and sign in to your Glow account. Enter J648 in the 21GRAMS box to learn more about \"DASH Diet: Care Instructions. \"     If you do not have an account, please click on the \"Sign Up Now\" link. Current as of: January 10, 2022               Content Version: 13.2  © 6201-2091 [employer]. Care instructions adapted under license by [facility] ([facility]). If you have questions about a medical condition or this instruction, always ask your healthcare professional. [place] [address] any warranty or liability for your use of this information. Patient Education        High Blood Pressure: Care Instructions  Overview     It's normal for blood pressure to go up and down throughout the day. But if it stays up, you have high blood pressure. Another name for high blood pressure ishypertension. Despite what a lot of people think, high blood pressure usually doesn't cause headaches or make you feel dizzy or lightheaded. It usually has no symptoms. But it does increase your risk of stroke, heart attack, and other problems. You and your doctor will talk about your risks of these problems based on yourblood pressure. Your doctor will give you a goal for your blood pressure. Your goal will bebased on your health and your age. Lifestyle changes, such as eating healthy and being active, are always important to help lower blood pressure. You might also take medicine to reachyour blood pressure goal.  Follow-up care is a key part of your treatment and safety.  Be sure to make and go to all appointments, and call your doctor if you are having problems. It's also a good idea to know your test results and keep alist of the medicines you take. How can you care for yourself at home? Medical treatment   If you stop taking your medicine, your blood pressure will go back up. You may take one or more types of medicine to lower your blood pressure. Be safe with medicines. Take your medicine exactly as prescribed. Call your doctor if you think you are having a problem with your medicine.  Talk to your doctor before you start taking aspirin every day. Aspirin can help certain people lower their risk of a heart attack or stroke. But taking aspirin isn't right for everyone, because it can cause serious bleeding.  See your doctor regularly. You may need to see the doctor more often at first or until your blood pressure comes down.  If you are taking blood pressure medicine, talk to your doctor before you take decongestants or anti-inflammatory medicine, such as ibuprofen. Some of these medicines can raise blood pressure.  Learn how to check your blood pressure at home. Lifestyle changes   Stay at a healthy weight. This is especially important if you put on weight around the waist. Losing even 10 pounds can help you lower your blood pressure.  If your doctor recommends it, get more exercise. Walking is a good choice. Bit by bit, increase the amount you walk every day. Try for at least 30 minutes on most days of the week. You also may want to swim, bike, or do other activities.  Avoid or limit alcohol. Talk to your doctor about whether you can drink any alcohol.  Try to limit how much sodium you eat to less than 2,300 milligrams (mg) a day. Your doctor may ask you to try to eat less than 1,500 mg a day.  Eat plenty of fruits (such as bananas and oranges), vegetables, legumes, whole grains, and low-fat dairy products.  Lower the amount of saturated fat in your diet.  Saturated fat is found in animal products such as milk, cheese, and meat. Limiting these foods may help you lose weight and also lower your risk for heart disease.  Do not smoke. Smoking increases your risk for heart attack and stroke. If you need help quitting, talk to your doctor about stop-smoking programs and medicines. These can increase your chances of quitting for good. When should you call for help? Call 911  anytime you think you may need emergency care. This may mean having symptoms that suggest that your blood pressure is causing a serious heart or blood vessel problem. Your blood pressure may be over 180/120. For example, call 911 if:     You have symptoms of a heart attack. These may include:  ? Chest pain or pressure, or a strange feeling in the chest.  ? Sweating. ? Shortness of breath. ? Nausea or vomiting. ? Pain, pressure, or a strange feeling in the back, neck, jaw, or upper belly or in one or both shoulders or arms. ? Lightheadedness or sudden weakness. ? A fast or irregular heartbeat.      You have symptoms of a stroke. These may include:  ? Sudden numbness, tingling, weakness, or loss of movement in your face, arm, or leg, especially on only one side of your body. ? Sudden vision changes. ? Sudden trouble speaking. ? Sudden confusion or trouble understanding simple statements. ? Sudden problems with walking or balance. ? A sudden, severe headache that is different from past headaches.      You have severe back or belly pain. Do not wait until your blood pressure comes down on its own. Get help right away. Call your doctor now or seek immediate care if:     Your blood pressure is much higher than normal (such as 180/120 or higher), but you don't have symptoms.      You think high blood pressure is causing symptoms, such as:  ? Severe headache.  ? Blurry vision.    Watch closely for changes in your health, and be sure to contact your doctor if:     Your blood pressure measures higher than your doctor recommends at least 2 times. That means the top number is higher or the bottom number is higher, or both.      You think you may be having side effects from your blood pressure medicine. Where can you learn more? Go to https://Charity Enginepatricia.[email or web address] org and sign in to your Linguee account. Enter O113 in the Bitbar box to learn more about \"High Blood Pressure: Care Instructions. \"     If you do not have an account, please click on the \"Sign Up Now\" link. Current as of: January 10, 2022               Content Version: 13.2  © 9045-0395 [employer]. Care instructions adapted under license by [facility] ([facility]). If you have questions about a medical condition or this instruction, always ask your healthcare professional. [address] any warranty or liability for your use of this information. Patient Education        Learning About High Blood Pressure  What is high blood pressure? Blood pressure is a measure of how hard the blood pushes against the walls of your arteries. It's normal for blood pressure to go up and down throughout the day. But if it stays up, you have high blood pressure. Another name for highblood pressure is hypertension. Two numbers tell you your blood pressure. The first number is the systolic pressure (top number). It shows how hard the blood pushes when your heart is pumping. The second number is the diastolic pressure (bottom number). It shows how hard the blood pushes between heartbeats, when your heart is relaxed andfilling with blood. Your doctor will give you a goal for your blood pressure based on your health and your age. High blood pressure (hypertension) means that the top numberstays high, or the bottom number stays high, or both. High blood pressure increases the risk of stroke, heart attack, and otherproblems. What happens when you have high blood pressure?    Blood flows through your arteries with too much force. Over time, this can damage the heart and the walls of your arteries. But you can't feel it. High blood pressure usually doesn't cause symptoms.  High blood pressure makes your heart work harder. And that can lead to heart failure, which means your heart doesn't pump as much blood as your body needs.  Fat and calcium start to build up in your arteries. This buildup is called hardening of the arteries. It can cause many problems including a heart attack and stroke.  Arteries also carry blood and oxygen to organs like your eyes, kidneys, and brain. If high blood pressure damages those arteries, it can lead to vision loss, kidney disease, stroke, and a higher risk of dementia. How can you prevent high blood pressure?  Stay at a healthy weight.  Try to limit how much sodium you eat to less than 2,300 milligrams (mg) a day. If you limit your sodium to 1,500 mg a day, you can lower your blood pressure even more. ? Buy foods that are labeled \"unsalted,\" \"sodium-free,\" or \"low-sodium. \" Foods labeled \"reduced-sodium\" and \"light sodium\" may still have too much sodium. ? Flavor your food with garlic, lemon juice, onion, vinegar, herbs, and spices instead of salt. Do not use soy sauce, steak sauce, onion salt, garlic salt, mustard, or ketchup on your food. ? Use less salt (or none) when recipes call for it. You can often use half the salt a recipe calls for without losing flavor.  Be physically active. Get at least 30 minutes of exercise on most days of the week. Walking is a good choice. You also may want to do other activities, such as running, swimming, cycling, or playing tennis or team sports.  Limit alcohol to 2 drinks a day for men and 1 drink a day for women.  Eat plenty of fruits, vegetables, and low-fat dairy products. Eat less saturated and total fats. How is high blood pressure treated?  Your doctor will suggest making lifestyle changes to help your heart.  For example, your doctor may ask you to eat healthy foods, quit smoking, lose extra weight, and be more active.  If lifestyle changes don't help enough, your doctor may recommend that you take medicine.  When blood pressure is very high, medicines are needed to lower it. Follow-up care is a key part of your treatment and safety. Be sure to make and go to all appointments, and call your doctor if you are having problems. It's also a good idea to know your test results and keep alist of the medicines you take. Where can you learn more? Go to https://QWiPSpeJoules Clothingeb.Genius Digital. org and sign in to your Sun Number account. Enter P501 in the RedT box to learn more about \"Learning About High Blood Pressure. \"     If you do not have an account, please click on the \"Sign Up Now\" link. Current as of: January 10, 2022               Content Version: 13.2  © 2006-2022 [employer]. Care instructions adapted under license by [facility] ([facility]). If you have questions about a medical condition or this instruction, always ask your healthcare professional. [place] [address] any warranty or liability for your use of this information. Patient Education        MMR Vaccine: Care Instructions  Your Care Instructions     An MMR vaccine protects against measles, mumps, and rubella. These diseases used to be common in children before the vaccine. Children get two doses of MMR. They get the first dose when they are 12 to 17 months old and the second dose at 3to 10years old. Be sure your child gets this second shot no later than age 15. These shots will prevent measles, mumps, and rubella for life. But if your community has had a recent mumps outbreak, ask your health departmentif you or your child will need another dose. The MMR vaccine may include the vaccine to protect against chickenpox(varicella) and is called the MMRV vaccine.   Sometimes doctors recommend the MMR vaccine for a child younger than 1 year if there is a measles outbreak. The vaccine also may be given to babies who will travel outside the [place]. An MMR vaccine given before age 3 must berepeated when the child is older than 1. A child who had a bad reaction to an MMR shot should not get another one. Be sure to tell your doctor if your child ever had a seizure or trouble breathingafter a vaccine. Some parents worry that the MMR vaccine causes autism spectrum disorder (ASD) in children. But many studies have been done, and no link has been found between MMR and ASD. Adults born after 26 who have not had the MMR vaccine should get at least one dose if they do not have evidence of immunity. Women who have not had the MMR vaccine should get it at least 4 weeks before trying to get pregnant. Rubella during pregnancy can cause birth defects. If you are pregnant, you cannot getthe vaccine until after your pregnancy is over. Follow-up care is a key part of your treatment and safety. Be sure to make and go to all appointments, and call your doctor if you are having problems. It's also a good idea to know your test results and keep alist of the medicines you take. How can you care for yourself at home?  Give acetaminophen (Tylenol) or ibuprofen (Advil, Motrin) if your child has a slight fever after the MMR shot. Be safe with medicines. Read and follow all instructions on the label. Do not give aspirin to anyone younger than 20. It has been linked to Reye syndrome, a serious illness.  Take an over-the-counter pain medicine, such as acetaminophen (Tylenol), ibuprofen (Advil, Motrin), or naproxen (Aleve) if your joints feel sore or stiff after an MMR shot. Read and follow all instructions on the label.  Put ice or a cold pack on the area for 10 to 20 minutes at a time. Put a thin cloth between the ice and your skin.  Your child may get a mild rash 1 to 2 weeks after the MMR vaccine. It usually goes away without treatment.  Call your doctor if the rash does not go away or it gets worse. When should you call for help? Call 911 anytime you think you or your child may need emergency care. For example, call if:     You or your child has a seizure.      You or your child has symptoms of a severe allergic reaction. These may include:  ? Sudden raised, red areas (hives) all over the body. ? Swelling of the throat, mouth, lips, or tongue. ? Trouble breathing. ? Passing out (losing consciousness). Or you or your child may feel very lightheaded or suddenly feel weak, confused, or restless. Call your doctor now or seek immediate medical care if:     You or your child has symptoms of an allergic reaction, such as:  ? A rash or hives (raised, red areas on the skin). ? Itching. ? Swelling. ? Belly pain, nausea, or vomiting.      You or your child has a high fever.      Your child cries for 3 hours or more within 2 days after getting the shot. Watch closely for changes in your or your child's health, and be sure tocontact your doctor if you have any problems. Where can you learn more? Go to https://eGood.ePAC Technologies. org and sign in to your [employer] account. Enter D120 in the HeyAnita box to learn more about \"MMR Vaccine: Care Instructions. \"     If you do not have an account, please click on the \"Sign Up Now\" link. Current as of: February 10, 2021               Content Version: 13.2  © 2006-2022 Healthwise, [facility]. Care instructions adapted under license by [facility] ([facility]). If you have questions about a medical condition or this instruction, always ask your healthcare professional. [place] [address] any warranty or liability for your use of this information.          Patient Education        tetanus, diphtheria, acellular pertussis vaccine (Tdap)  Pronunciation: TET a nus, dif THECAROLINE pizano a, and ay JONNA garcia per TUS iss  Brand: Adacel (Tdap), Boostrix (Tdap)  What is the most important information I should know about this vaccine? Becoming infected with tetanus, diphtheria, or pertussis is much more dangerousto your health than receiving this vaccine. What is tetanus, diphtheria, acellular pertussis vaccine (Tdap)? Tetanus, diphtheria, and pertussis are serious diseases caused by bacteria. Tetanus (lockjaw) causes painful tightening of the muscles that can lead to \"locking\" of the jaw so the victim cannot open the mouth, swallow, or breathe. Tetanus can lead to death. Diphtheria can lead to breathing problems, paralysis, heart failure, or death. Pertussis (whooping cough) causes severe long-lasting episodes of cough that can interfere with eating, drinking, or breathing. Pertussis can lead topneumonia, seizures, brain damage, and death. Diphtheria and pertussis are spread from person to person. Tetanus enters thebody through a cut or wound. The tetanus, diphtheria, and acellular pertussis adult vaccine (also called Tdap) is used to help prevent these diseases in people who are 10 to 60 yearsold. Like any vaccine, the Tdap vaccine may not provide protection from disease inevery person. What should I discuss with my healthcare provider before receiving this vaccine? You should not receive this vaccine if you've ever had:   a life-threatening allergic reaction to a vaccine containing tetanus, diphtheria, or pertussis; or   decreased consciousness, seizures, or coma within 7 days after receiving a pertussis vaccine. If you have any of these other conditions, your vaccine may need to bepostponed or not given at all:   seizures;   a weak immune system (caused by disease or by using certain medicine); or   Guillain-Barré syndrome within 6 weeks after receiving a tetanus vaccine. You can still receive a vaccine if you have a minor cold. In the case of a more severe illness with a fever or any type of infection, wait until you get betterbefore receiving this vaccine.   If you are pregnant or breastfeeding, your doctor or vaccination provider should determine whether you need a Tdapvaccine. If you are pregnant, your name may be listed on a pregnancy registry to trackthe effects of this vaccine on the baby. This version of the vaccine (Adacel, Boostrix) should not be given to anyone under the age of 8. Another vaccine is available for use in children youngerthan 8years old. How is this vaccine given? This vaccine is given as an injection (shot) into a muscle. Tdap vaccine is usually given as a one-time injection. Unless your doctor'stells you otherwise, you will not need a booster vaccine. What happens if I miss a dose? Since the Tdap vaccine is usually given only once, you are not likely to miss adose. What happens if I overdose? An overdose of this vaccine is unlikely to occur. What should I avoid before or after receiving this vaccine? Follow your vaccination provider's instructions about any restrictions on food,beverages, or activity. What are the possible side effects of this vaccine? Get emergency medical help if you have signs of an allergic reaction: hives; difficult breathing; swelling of your face, lips, tongue, or throat. Becoming infected with diphtheria, pertussis, or tetanus is much more dangerous to your health than receiving this vaccine. However, like any medicine, this vaccine can cause side effects but the risk of serious side effects isextremely low. Call your doctor at once if you have:   severe pain, itching, swelling, or redness where the shot was given;   high fever (over 102 degrees F);   a light-headed feeling, like you might pass out;   severe joint pain; or   nervous system problems --numbness, pain, tingling, weakness, burning or prickly feeling, vision or hearing problems, trouble breathing.   Common side effects may include:   nausea, vomiting, diarrhea, and/or abdominal pain;   pain, redness, or swelling where the shot was given;   headache or tiredness;   body aches; or   fever. This is not a complete list of side effects and others may occur. Call your doctor for medical advice about side effects. You may report vaccine sideeffects to the [address] [place] [address] [place] at [phone]. What other drugs will affect tetanus, diphtheria, acellular pertussis vaccine? Before receiving this vaccine, tell your vaccination provider about all othervaccines you have recently received. Also, tell the vaccination provider if you have recently received drugs ortreatments that can weaken the immune system, including:   steroid medicine;   cancer treatments;   medicine to treat psoriasis, rheumatoid arthritis, or other autoimmune disorders; or   medicines to treat or prevent organ transplant rejection. If you are using any of these medications, you may not be able to receive thevaccine, or may need to wait until the other treatments are finished. This list is not complete. Other drugs may affect this vaccine, including prescription and over-the-counter medicines, vitamins, and herbal products. Notall possible drug interactions are listed here. Where can I get more information? Your vaccination provider, pharmacist, or doctor can provide more information about this vaccine. Additional information is available from your local [facility] or the Centers for Disease Control and Prevention. Remember, keep this and all other medicines out of the reach of children, never share your medicines with others, and use this medication only for the indication prescribed. Every effort has been made to ensure that the information provided by [address] is accurate, up-to-date, and complete, but no guarantee is made to that effect. Drug information contained herein may be time sensitive.  [facility] information has been compiled for use by healthcare practitioners and consumers in the [place] and therefore [facility] does not warrant that uses outside of the [place] are appropriate, unless specifically indicated otherwise. [facility]'s drug information does not endorse drugs, diagnose patients or recommend therapy. [facility] drug information is an informational resource designed to assist licensed healthcare practitioners in caring for their patients and/or to serve consumers viewing this service as a supplement to, and not a substitute for, the expertise, skill, knowledge and judgment of healthcare practitioners. The absence of a warning for a given drug or drug combination in no way should be construed to indicate that the drug or drug combination is safe, effective or appropriate for any given patient. [facility] does not assume any responsibility for any aspect of healthcare administered with the aid of information [facility] provides. The information contained herein is not intended to cover all possible uses, directions, precautions, warnings, drug interactions, allergic reactions, or adverse effects. If you have questions about the drugs you are taking, check with yourdoctor, nurse or pharmacist.  Copyright 4734-9126 [address] [place] [address] Dr OSP[address]. Version: 4.01. Revision date: 12/1/2021. Care instructions adapted under license by [facility] ([facility]). If you have questions about a medical condition or this instruction, always ask your healthcare professional. [place] [address] any warranty or liability for your use of this information.

## 2022-06-08 LAB — ZINC: 64.2 UG/DL (ref 60–120)

## 2022-06-14 ENCOUNTER — OFFICE VISIT (OUTPATIENT)
Dept: FAMILY MEDICINE CLINIC | Age: 42
End: 2022-06-14
Payer: COMMERCIAL

## 2022-06-14 VITALS
BODY MASS INDEX: 23.23 KG/M2 | WEIGHT: 139.6 LBS | SYSTOLIC BLOOD PRESSURE: 152 MMHG | DIASTOLIC BLOOD PRESSURE: 98 MMHG | OXYGEN SATURATION: 98 % | TEMPERATURE: 97.8 F | HEART RATE: 98 BPM

## 2022-06-14 DIAGNOSIS — I10 ESSENTIAL HYPERTENSION: Primary | ICD-10-CM

## 2022-06-14 DIAGNOSIS — Z98.84 S/P LAPAROSCOPIC SLEEVE GASTRECTOMY: ICD-10-CM

## 2022-06-14 DIAGNOSIS — D50.8 OTHER IRON DEFICIENCY ANEMIA: ICD-10-CM

## 2022-06-14 PROCEDURE — 1036F TOBACCO NON-USER: CPT | Performed by: NURSE PRACTITIONER

## 2022-06-14 PROCEDURE — G8427 DOCREV CUR MEDS BY ELIG CLIN: HCPCS | Performed by: NURSE PRACTITIONER

## 2022-06-14 PROCEDURE — G8420 CALC BMI NORM PARAMETERS: HCPCS | Performed by: NURSE PRACTITIONER

## 2022-06-14 PROCEDURE — 99214 OFFICE O/P EST MOD 30 MIN: CPT | Performed by: NURSE PRACTITIONER

## 2022-06-14 RX ORDER — CARVEDILOL 12.5 MG/1
12.5 TABLET ORAL 2 TIMES DAILY
Qty: 60 TABLET | Refills: 1 | Status: SHIPPED | OUTPATIENT
Start: 2022-06-14 | End: 2022-06-27

## 2022-06-14 ASSESSMENT — ENCOUNTER SYMPTOMS
DIARRHEA: 0
SHORTNESS OF BREATH: 0
CHEST TIGHTNESS: 0
ALLERGIC/IMMUNOLOGIC NEGATIVE: 1
WHEEZING: 0
NAUSEA: 0
RESPIRATORY NEGATIVE: 1
VOMITING: 0
COUGH: 0
GASTROINTESTINAL NEGATIVE: 1
EYES NEGATIVE: 1
COLOR CHANGE: 0

## 2022-06-14 ASSESSMENT — PATIENT HEALTH QUESTIONNAIRE - PHQ9
1. LITTLE INTEREST OR PLEASURE IN DOING THINGS: 0
SUM OF ALL RESPONSES TO PHQ QUESTIONS 1-9: 0
SUM OF ALL RESPONSES TO PHQ9 QUESTIONS 1 & 2: 0
2. FEELING DOWN, DEPRESSED OR HOPELESS: 0

## 2022-06-14 NOTE — PROGRESS NOTES
[facility]  [address] [facility]  Dept: [phone]    Cresencio Salomon is a 35 y.o. female who presents today for her medical conditions/complaintsas noted below. Cresencio Kidney is here today c/o Hypertension    Past Medical History:   Diagnosis Date    GERD (gastroesophageal reflux disease)     Hyperlipidemia     not on rx    Hypertension     managed per pcp    Kidney stone     Major depressive disorder, recurrent episode, mild ([facility] 10/21/2015    Palmira Hollingsworth    Obesity, Class III, BMI 40-49.9 (morbid obesity) ([facility]     TRISHA (obstructive sleep apnea)     Saw Dr. Swati Hernandez for clearance for surgery    Pre-diabetes     diet control    Presence of tooth-root and mandibular implants     all top teeth    Tachycardia     per pt.  Saw Dr. Claudio Mancera 2020    Wears contact lenses     Instructed not to wear contacts for surgery      Past Surgical History:   Procedure Laterality Date     SECTION      x1    LEG AMPUTATION  2021    LAPAROSCOPIC ROBOTIC GASTRECTOMY SLEEVE, HIATAL HERNIA REPAIR, EGD     LEG AMPUTATION N/A 2021    XI LAPAROSCOPIC ROBOTIC GASTRECTOMY SLEEVE, HIATAL HERNIA REPAIR, EGD - GI UNIT SCHEDULED. performed by Silverio Torrez DO at [address] [facility]  ENDOSCOPY N/A 2021    EGD BIOPSY performed by Silverio Torrez DO at [facility]       Family History   Problem Relation Age of Onset    Hypertension Mother     Asthma Mother     Hypertension Maternal Aunt     Asthma Maternal Uncle     Hypertension Maternal Uncle     Arthritis Maternal Grandmother     Hypertension Maternal Grandmother     Stroke Maternal Grandmother     Breast Cancer Neg Hx     Cancer Neg Hx     Colon Cancer Neg Hx     Diabetes Neg Hx     Eclampsia Neg Hx     Ovarian Cancer Neg Hx      Labor Neg Hx     Spont Abortions Neg Hx        Social History     Tobacco Use    Smoking status: Never Smoker    Smokeless tobacco: Never Used   Substance Use Topics    Alcohol use: No     Alcohol/week: 0.0 standard drinks      Current Outpatient Medications   Medication Sig Dispense Refill    carvedilol (COREG) 6.25 MG tablet Take 1 tablet by mouth 2 times daily 60 tablet 1    vitamin D (ERGOCALCIFEROL) 1.25 MG (34822 UT) CAPS capsule Take 1 capsule by mouth once a week for 8 doses 8 capsule 0    losartan (COZAAR) 100 MG tablet       montelukast (SINGULAIR) 10 MG tablet TAKE 1 TABLET BY MOUTH DAILY 90 tablet 1    potassium chloride (KLOR-CON M) 20 MEQ extended release tablet TAKE TWO (2) TABLETS BY MOUTH ONCE DAILY 60 tablet 5    amLODIPine (NORVASC) 10 MG tablet Take 1 tablet by mouth daily 90 tablet 1    ferrous sulfate (IRON 325) 325 (65 Fe) MG tablet Take 1 tablet by mouth daily (with breakfast) 90 tablet 1    Multiple Vitamins-Minerals (MULTIVITAMIN WITH MINERALS) tablet Take 2 tablets by mouth daily 30 tablet 0     No current facility-administered medications for this visit. No Known Allergies      HPI:     HPI    Presents today c/o follow-up HTN / labs      H/o HTN   Taking losartan, amlodipine, carvedilol   Tolerating medications without bothersome side effects   BP running 150 / 90's on average at home   Declines related headaches, dizziness, dyspnea, chest pain, or neurological sx  Intentional weight loss secondary to bariatric surgery    H/o iron deficient anemia   Upcoming appointment with Cheri to discuss iron infusions   Reports fatigue     Health Maintenance:      Subjective:     Review of Systems   Constitutional: Positive for fatigue. Negative for appetite change, chills, diaphoresis, fever and unexpected weight change. HENT: Negative. Eyes: Negative. Respiratory: Negative. Negative for cough, chest tightness, shortness of breath and wheezing. Cardiovascular: Negative. Negative for chest pain and leg swelling. Gastrointestinal: Negative. Negative for diarrhea, nausea and vomiting. Endocrine: Negative. Genitourinary: Negative. Negative for difficulty urinating. Musculoskeletal: Negative. Skin: Negative. Negative for color change, pallor, rash and wound. Allergic/Immunologic: Negative. Neurological: Negative. Negative for dizziness, seizures, syncope, light-headedness and headaches. Hematological: Negative. Psychiatric/Behavioral: Negative. Negative for dysphoric mood. The patient is not nervous/anxious. Objective:     Vitals:    06/14/22 1455   BP: (!) 162/96   Pulse: 98   Temp: 97.8 °F (36.6 °C)   SpO2: 98%     Vitals:    06/14/22 1503   BP: (!) 152/98   Pulse:    Temp:    SpO2:        Body mass index is 23.23 kg/m². Physical Exam  Constitutional:       General: She is not in acute distress. Appearance: Normal appearance. She is well-developed and normal weight. She is not ill-appearing, toxic-appearing or diaphoretic. HENT:      Head: Normocephalic and atraumatic. Right Ear: External ear normal.      Left Ear: External ear normal.      Nose: Nose normal.   Eyes:      General: No scleral icterus. Right eye: No discharge. Left eye: No discharge. Conjunctiva/sclera: Conjunctivae normal.      Pupils: Pupils are equal, round, and reactive to light. Neck:      Trachea: No tracheal deviation. Cardiovascular:      Rate and Rhythm: Normal rate and regular rhythm. Heart sounds: Normal heart sounds. No murmur heard. No friction rub. No gallop. Pulmonary:      Effort: Pulmonary effort is normal. No tachypnea, accessory muscle usage or respiratory distress. Breath sounds: Normal breath sounds. No stridor. No decreased breath sounds, wheezing, rhonchi or rales. Abdominal:      Palpations: Abdomen is soft. Musculoskeletal:         General: No tenderness or deformity. Normal range of motion. Cervical back: Normal range of motion and neck supple. Skin:     General: Skin is warm and dry.       Coloration: Skin is not pale. Findings: No erythema or rash. Neurological:      Mental Status: She is alert and oriented to person, place, and time. GCS: GCS eye subscore is 4. GCS verbal subscore is 5. GCS motor subscore is 6. Gait: Gait is intact. Gait normal.   Psychiatric:         Speech: Speech normal.         Behavior: Behavior normal.         Thought Content: Thought content normal.         Judgment: Judgment normal.           Assessment:         1. Essential hypertension    2. S/P laparoscopic sleeve gastrectomy    3. Other iron deficiency anemia        Plan:     1. Essential hypertension    - carvedilol (COREG) 12.5 MG tablet; Take 1 tablet by mouth 2 times daily  Dispense: 60 tablet; Refill: 1    BP elevated above goal  Increase Coreg 12.5 mg BID  Follow-up 3-4 weeks   Lifestyle modifications  Consideration for secondary work-up discussion @ follow-up appointment due to uncontrolled HTN s/p weight loss     2. S/P laparoscopic sleeve gastrectomy    Follows w/ Bariatrics     3. Other iron deficiency anemia    Upcoming appointment with Hematology to discuss infusions     Discussed use, benefit, and side effects of prescribed medications. All patient questions answered. Pt voiced understanding. Reviewed health maintenance. Instructed to continue current medications, diet and exercise. Patient agreedwith treatment plan. Follow up as directed.      Electronically signed by LUZ Cunha CNP on 6/14/2022

## 2022-06-27 ENCOUNTER — OFFICE VISIT (OUTPATIENT)
Dept: FAMILY MEDICINE CLINIC | Age: 42
End: 2022-06-27
Payer: COMMERCIAL

## 2022-06-27 VITALS
BODY MASS INDEX: 22.86 KG/M2 | HEART RATE: 81 BPM | TEMPERATURE: 97.1 F | DIASTOLIC BLOOD PRESSURE: 104 MMHG | SYSTOLIC BLOOD PRESSURE: 166 MMHG | WEIGHT: 137.4 LBS | OXYGEN SATURATION: 97 %

## 2022-06-27 DIAGNOSIS — Z13.31 POSITIVE DEPRESSION SCREENING: ICD-10-CM

## 2022-06-27 DIAGNOSIS — F43.23 ADJUSTMENT DISORDER WITH MIXED ANXIETY AND DEPRESSED MOOD: ICD-10-CM

## 2022-06-27 DIAGNOSIS — I10 ESSENTIAL HYPERTENSION: ICD-10-CM

## 2022-06-27 DIAGNOSIS — F43.29 STRESS AND ADJUSTMENT REACTION: Primary | ICD-10-CM

## 2022-06-27 PROCEDURE — 99214 OFFICE O/P EST MOD 30 MIN: CPT | Performed by: NURSE PRACTITIONER

## 2022-06-27 PROCEDURE — G8420 CALC BMI NORM PARAMETERS: HCPCS | Performed by: NURSE PRACTITIONER

## 2022-06-27 PROCEDURE — 1036F TOBACCO NON-USER: CPT | Performed by: NURSE PRACTITIONER

## 2022-06-27 PROCEDURE — G8427 DOCREV CUR MEDS BY ELIG CLIN: HCPCS | Performed by: NURSE PRACTITIONER

## 2022-06-27 RX ORDER — ESCITALOPRAM OXALATE 10 MG/1
10 TABLET ORAL DAILY
Qty: 30 TABLET | Refills: 3 | Status: SHIPPED | OUTPATIENT
Start: 2022-06-27 | End: 2022-10-31

## 2022-06-27 RX ORDER — CARVEDILOL 25 MG/1
25 TABLET ORAL 2 TIMES DAILY
Qty: 60 TABLET | Refills: 2 | Status: SHIPPED | OUTPATIENT
Start: 2022-06-27 | End: 2022-09-30

## 2022-06-27 ASSESSMENT — PATIENT HEALTH QUESTIONNAIRE - PHQ9
3. TROUBLE FALLING OR STAYING ASLEEP: 2
SUM OF ALL RESPONSES TO PHQ9 QUESTIONS 1 & 2: 6
6. FEELING BAD ABOUT YOURSELF - OR THAT YOU ARE A FAILURE OR HAVE LET YOURSELF OR YOUR FAMILY DOWN: 0
SUM OF ALL RESPONSES TO PHQ QUESTIONS 1-9: 13
2. FEELING DOWN, DEPRESSED OR HOPELESS: 3
1. LITTLE INTEREST OR PLEASURE IN DOING THINGS: 3
SUM OF ALL RESPONSES TO PHQ QUESTIONS 1-9: 13
SUM OF ALL RESPONSES TO PHQ QUESTIONS 1-9: 13
4. FEELING TIRED OR HAVING LITTLE ENERGY: 2
9. THOUGHTS THAT YOU WOULD BE BETTER OFF DEAD, OR OF HURTING YOURSELF: 0
SUM OF ALL RESPONSES TO PHQ QUESTIONS 1-9: 13
10. IF YOU CHECKED OFF ANY PROBLEMS, HOW DIFFICULT HAVE THESE PROBLEMS MADE IT FOR YOU TO DO YOUR WORK, TAKE CARE OF THINGS AT HOME, OR GET ALONG WITH OTHER PEOPLE: 2
DEPRESSION UNABLE TO ASSESS: PT REFUSES
8. MOVING OR SPEAKING SO SLOWLY THAT OTHER PEOPLE COULD HAVE NOTICED. OR THE OPPOSITE, BEING SO FIGETY OR RESTLESS THAT YOU HAVE BEEN MOVING AROUND A LOT MORE THAN USUAL: 0
7. TROUBLE CONCENTRATING ON THINGS, SUCH AS READING THE NEWSPAPER OR WATCHING TELEVISION: 1
5. POOR APPETITE OR OVEREATING: 2

## 2022-06-27 ASSESSMENT — ENCOUNTER SYMPTOMS
ALLERGIC/IMMUNOLOGIC NEGATIVE: 1
GASTROINTESTINAL NEGATIVE: 1
CHEST TIGHTNESS: 1
EYES NEGATIVE: 1
COUGH: 0
COLOR CHANGE: 0
VOMITING: 0
STRIDOR: 0
SHORTNESS OF BREATH: 1
NAUSEA: 0
DIARRHEA: 0

## 2022-06-27 NOTE — LETTER
[facility]  [address] [facility] [address]  Phone: [phone]  Fax: [phone]    LUZ Chacon CNP        June 27, 2022     Patient: Nathalie Paulino   YOB: 1980   Date of Visit: 6/27/2022       To Whom It May Concern: It is my medical opinion that Piper Niall off work , RTW 7/11/2022 without restrictions . If you have any questions or concerns, please don't hesitate to call.     Sincerely,        LUZ Chacon CNP

## 2022-06-27 NOTE — PROGRESS NOTES
[facility]  [address] [facility]  Dept: [phone]    Tenzin Mcdowell is a 35 y.o. female who presents today for her medical conditions/complaintsas noted below. Tenzin Mcdowell is here today c/o Panic Attack (today)    Past Medical History:   Diagnosis Date    GERD (gastroesophageal reflux disease)     Hyperlipidemia     not on rx    Hypertension     managed per pcp    Kidney stone     Major depressive disorder, recurrent episode, mild ([facility] [address] 10/21/2015    Palmira Hollingsworth    Obesity, Class III, BMI 40-49.9 (morbid obesity) ([facility]     TRISHA (obstructive sleep apnea)     Saw Dr. Ashlee Dillon for clearance for surgery    Pre-diabetes     diet control    Presence of tooth-root and mandibular implants     all top teeth    Tachycardia     per pt.  Saw Dr. Amari Brown 2020    Wears contact lenses     Instructed not to wear contacts for surgery      Past Surgical History:   Procedure Laterality Date     SECTION      x1    LEG AMPUTATION  2021    LAPAROSCOPIC ROBOTIC GASTRECTOMY SLEEVE, HIATAL HERNIA REPAIR, EGD     LEG AMPUTATION N/A 2021    XI LAPAROSCOPIC ROBOTIC GASTRECTOMY SLEEVE, HIATAL HERNIA REPAIR, EGD - GI UNIT SCHEDULED. performed by Clare Smith DO at [address] [place] [address]  ENDOSCOPY N/A 2021    EGD BIOPSY performed by Clare Smith DO at [facility]       Family History   Problem Relation Age of Onset    Hypertension Mother     Asthma Mother     Hypertension Maternal Aunt     Asthma Maternal Uncle     Hypertension Maternal Uncle     Arthritis Maternal Grandmother     Hypertension Maternal Grandmother     Stroke Maternal Grandmother     Breast Cancer Neg Hx     Cancer Neg Hx     Colon Cancer Neg Hx     Diabetes Neg Hx     Eclampsia Neg Hx     Ovarian Cancer Neg Hx      Labor Neg Hx     Spont Abortions Neg Hx        Social History     Tobacco Use    Smoking status: Never Smoker    Smokeless tobacco: Never Used   Substance Use Topics    Alcohol use: No     Alcohol/week: 0.0 standard drinks      Current Outpatient Medications   Medication Sig Dispense Refill    carvedilol (COREG) 12.5 MG tablet Take 1 tablet by mouth 2 times daily 60 tablet 1    vitamin D (ERGOCALCIFEROL) 1.25 MG (83237 UT) CAPS capsule Take 1 capsule by mouth once a week for 8 doses 8 capsule 0    losartan (COZAAR) 100 MG tablet       montelukast (SINGULAIR) 10 MG tablet TAKE 1 TABLET BY MOUTH DAILY 90 tablet 1    potassium chloride (KLOR-CON M) 20 MEQ extended release tablet TAKE TWO (2) TABLETS BY MOUTH ONCE DAILY 60 tablet 5    amLODIPine (NORVASC) 10 MG tablet Take 1 tablet by mouth daily 90 tablet 1    ferrous sulfate (IRON 325) 325 (65 Fe) MG tablet Take 1 tablet by mouth daily (with breakfast) 90 tablet 1    Multiple Vitamins-Minerals (MULTIVITAMIN WITH MINERALS) tablet Take 2 tablets by mouth daily 30 tablet 0     No current facility-administered medications for this visit. No Known Allergies      HPI:     HPI    Presents today c/o stress   Symptoms ongoing for the past few months   Triggers include work   Reports symptoms of panic attacks for the past several weeks   Symptoms are gradually worsening   Reports chest tightness, shortness of breath, hyperventilating, crying spells, depression, insomnia (problems falling asleep) , fatigue, lack of appetite   Declines SI/HI   PHQ 9 - 13     Health Maintenance:      Subjective:     Review of Systems   Constitutional: Positive for appetite change and fatigue. Negative for chills, diaphoresis, fever and unexpected weight change. HENT: Negative. Eyes: Negative. Respiratory: Positive for chest tightness and shortness of breath. Negative for cough and stridor. Cardiovascular: Positive for chest pain and palpitations. Negative for leg swelling. Gastrointestinal: Negative. Negative for diarrhea, nausea and vomiting. Endocrine: Negative. Genitourinary: Negative. Negative for difficulty urinating and dysuria. Musculoskeletal: Negative. Skin: Negative. Negative for color change, pallor, rash and wound. Allergic/Immunologic: Negative. Neurological: Negative. Negative for seizures, syncope and facial asymmetry. Hematological: Negative. Psychiatric/Behavioral: Positive for decreased concentration, dysphoric mood and sleep disturbance. Negative for behavioral problems, confusion, hallucinations, self-injury and suicidal ideas. The patient is nervous/anxious. The patient is not hyperactive. Objective:     Vitals:    06/27/22 1115   BP: (!) 188/120   Pulse: 81   Temp: 97.1 °F (36.2 °C)   SpO2: 97%     Vitals:    06/27/22 1145   BP: (!) 166/104   Pulse:    Temp:    SpO2:        Body mass index is 22.86 kg/m². Physical Exam  Constitutional:       General: She is not in acute distress. Appearance: She is well-developed. HENT:      Head: Normocephalic and atraumatic. Right Ear: External ear normal.      Left Ear: External ear normal.      Nose: Nose normal.   Eyes:      General: No scleral icterus. Right eye: No discharge. Left eye: No discharge. Conjunctiva/sclera: Conjunctivae normal.      Pupils: Pupils are equal, round, and reactive to light. Neck:      Trachea: No tracheal deviation. Cardiovascular:      Rate and Rhythm: Normal rate and regular rhythm. Pulmonary:      Effort: Pulmonary effort is normal. No tachypnea, accessory muscle usage or respiratory distress. Breath sounds: Normal breath sounds. No stridor. No decreased breath sounds, wheezing, rhonchi or rales. Abdominal:      Palpations: Abdomen is soft. Musculoskeletal:         General: No tenderness or deformity. Normal range of motion. Cervical back: Normal range of motion and neck supple. Skin:     General: Skin is warm and dry. Coloration: Skin is not pale. Findings: No erythema or rash. Neurological:      Mental Status: She is alert and oriented to person, place, and time. GCS: GCS eye subscore is 4. GCS verbal subscore is 5. GCS motor subscore is 6. Gait: Gait is intact. Gait normal.   Psychiatric:         Attention and Perception: Attention and perception normal.         Mood and Affect: Mood is anxious and depressed. Affect is tearful. Speech: Speech normal.         Behavior: Behavior normal. Behavior is cooperative. Thought Content: Thought content normal. Thought content is not paranoid or delusional. Thought content does not include homicidal or suicidal ideation. Thought content does not include homicidal or suicidal plan. Cognition and Memory: Cognition and memory normal.         Judgment: Judgment normal.           Assessment:         1. Stress and adjustment reaction    2. Adjustment disorder with mixed anxiety and depressed mood    3. Positive depression screening    4. Essential hypertension        Plan:     1. Stress and adjustment reaction    - escitalopram (LEXAPRO) 10 MG tablet; Take 1 tablet by mouth daily  Dispense: 30 tablet; Refill: 3  - [address] ([address])    Not at goal   Deep breathing handouts given  Avoidance of stressors  Referral to [facility] for counseling   Trial of Lexapro, side effects discussed  F/u 1 month as scheduled   Off work x 2 week RTW 7/11    2. Adjustment disorder with mixed anxiety and depressed mood    - escitalopram (LEXAPRO) 10 MG tablet; Take 1 tablet by mouth daily  Dispense: 30 tablet; Refill: 3  - [address] ([address])    3. Positive depression screening    - escitalopram (LEXAPRO) 10 MG tablet; Take 1 tablet by mouth daily  Dispense: 30 tablet;  Refill: 3  - [address] ([address])    On the basis of positive PHQ-9 screening (PHQ-9 Total Score: 13), the following plan was implemented: additional evaluation and assessment performed, follow-up plan includes but not limited to: Medication management and Referral to /Specialist for evaluation and management, additional evaluation and assessment performed, referral to Behavioral health provided and medication prescribed - patient will call for any significant medication side effects or worsening symptoms of depression. Patient will follow-up in 2 week(s) with psychologist.    4. Essential hypertension    - carvedilol (COREG) 25 MG tablet; Take 1 tablet by mouth 2 times daily  Dispense: 60 tablet; Refill: 2    BP elevated above goal  Increase coreg as noted above  Continue other medications  F/u in 3 weeks as scheduled  We discussed ordering secondary HTN work-up at follow-up given weight loss and failure of BP improvement     Discussed use, benefit, and side effects of prescribed medications. All patient questions answered. Pt voiced understanding. Reviewed health maintenance. Instructed to continue current medications, diet and exercise. Patient agreedwith treatment plan. Follow up as directed.      Electronically signed by LUZ Gallagher CNP on 6/27/2022

## 2022-06-27 NOTE — PATIENT INSTRUCTIONS
Patient Education        Panic Attacks: Care Instructions  Overview     During a panic attack, you may have a feeling of intense fear or terror, trouble breathing, chest pain or tightness, heartbeat changes, dizziness, sweating, and shaking. A panic attack starts suddenly and usually lasts from 5 to 20 minutes but may last even longer. An attack can begin with a stressfulevent. Or it can happen without a cause. Although panic attacks can cause scary symptoms, you can learn to manage themwith self-care, counseling, and medicine. Follow-up care is a key part of your treatment and safety. Be sure to make and go to all appointments, and call your doctor if you are having problems. It's also a good idea to know your test results and keep alist of the medicines you take. How can you care for yourself at home?  Take your medicine exactly as directed. Call your doctor if you think you are having a problem with your medicine.  Go to your counseling sessions and follow-up appointments.  Recognize and accept your anxiety. Then, when you are in a situation that makes you anxious, say to yourself, \"This is not an emergency. I feel uncomfortable, but I am not in danger. I can keep going even if I feel anxious. \"   Be kind to your body:  ? Relieve tension with exercise or a massage. ? Get enough rest.  ? Avoid alcohol, caffeine, nicotine, and illegal drugs. They can increase your anxiety level, cause sleep problems, or trigger a panic attack. ? Learn and do relaxation techniques. See below for more about these techniques.  Engage your mind. Get out and do something you enjoy. Go to a funny movie, or take a walk or hike. Plan your day. Having too much or too little to do can make you anxious.  Keep a record of your symptoms. Discuss your fears with a good friend or family member, or join a support group for people with similar problems. Talking to others sometimes relieves stress.    Get involved in social groups, or volunteer to help others. Being alone sometimes makes things seem worse than they are.  Get at least 30 minutes of exercise on most days of the week to relieve stress. Walking is a good choice. You also may want to do other activities, such as running, swimming, cycling, or playing tennis or team sports. Relaxation techniques  Do relaxation exercises for 10 to 20 minutes a day. You can play soothing,relaxing music while you do them, if you wish.  Tell others in your house that you are going to do your relaxation exercises. Ask them not to disturb you.  Find a comfortable place, away from all distractions and noise.  Lie down on your back, or sit with your back straight.  Focus on your breathing. Make it slow and steady.  Breathe in through your nose. Breathe out through either your nose or mouth.  Breathe deeply, filling up the area between your navel and your rib cage. Breathe so that your belly goes up and down.  Do not hold your breath.  Breathe like this for 5 to 10 minutes. Notice the feeling of calmness throughout your whole body. As you continue to breathe slowly and deeply, relax by doing the following foranother 5 to 10 minutes:   Tighten and relax each muscle group in your body. You can begin at your toes and work your way up to your head.  Imagine your muscle groups relaxing and becoming heavy.  Empty your mind of all thoughts.  Let yourself relax more and more deeply.  Become aware of the state of calmness that surrounds you.  When your relaxation time is over, you can bring yourself back to alertness by moving your fingers and toes and then your hands and feet and then stretching and moving your entire body. Sometimes people fall asleep during relaxation, but they usually wake up shortly afterward.  Always give yourself time to return to full alertness before you drive a car or do anything that might cause an accident if you are not fully alert.  Never play a relaxation tape while driving a car. When should you call for help? Call 911 anytime you think you may need emergency care. For example, call if:     You feel you cannot stop from hurting yourself or someone else. If you or someone you know talks about suicide, self-harm, or feeling hopeless, get help right away. Call the [address] [place] [address] at 7-572-181-XTWM ([phone]) or text HOME to 054671 to access the [employer]. Consider saving these numbers in your phone. Watch closely for changes in your health, and be sure to contact your doctor if:     Your panic attacks get worse.      You have new or different anxiety.      You are not getting better as expected. Where can you learn more? Go to https://[employer].nfon. org and sign in to your [employer] account. Enter H601 in the Flypost.co box to learn more about \"Panic Attacks: Care Instructions. \"     If you do not have an account, please click on the \"Sign Up Now\" link. Current as of: February 9, 2022               Content Version: 13.3  © 0675-5321 [employer]. Care instructions adapted under license by [facility] ([facility]). If you have questions about a medical condition or this instruction, always ask your healthcare professional. [address] any warranty or liability for your use of this information. Patient Education         3 Breathing Exercises to Help You Relax (04:33)  Your health professional recommends that you watch this short online healthvideo. Here are three breathing exercises you can do when you need to relax and feel calm. Purpose:  Shows patients how to do three breathing exercises to help them relax and feelcalm. Goal:  The viewer will learn how to do three breathing exercises to help them relaxand feel calm. How to watch the video    Scan the QR code   OR Visit the website    [email or web address] se/[email or web address]   Current as of: February 9, 6853               VLHWSRV Version: 13.3  © 0348-7178 [employer]. Care instructions adapted under license by [employer] ([facility]). If you have questions about a medical condition or this instruction, always ask your healthcare professional. [address] any warranty or liability for your use of this information. Patient Education         How to Do Mindfulness Meditation (03:24)  Your health professional recommends that you watch this short online healthvideo. Learn what mindfulness meditation is, and try a short meditation. Purpose:  Teaches what mindfulness meditation is and guides the viewer through a shortmeditation. Goal:  The user will understand what mindfulness meditation is. The user will also beguided through a short meditation. How to watch the video    Scan the QR code   OR Visit the website    [email or web address] se/[email or web address]   Current as of: February 9, 2022               Content Version: 13.3  © 2006-2022 [employer]. Care instructions adapted under license by [facility] ([facility]). If you have questions about a medical condition or this instruction, always ask your healthcare professional. [address] any warranty or liability for your use of this information.

## 2022-06-28 ENCOUNTER — HOSPITAL ENCOUNTER (OUTPATIENT)
Facility: MEDICAL CENTER | Age: 42
End: 2022-06-28

## 2022-06-30 ENCOUNTER — OFFICE VISIT (OUTPATIENT)
Dept: BEHAVIORAL/MENTAL HEALTH CLINIC | Age: 42
End: 2022-06-30
Payer: COMMERCIAL

## 2022-06-30 DIAGNOSIS — F33.1 MAJOR DEPRESSIVE DISORDER, RECURRENT EPISODE, MODERATE WITH ANXIOUS DISTRESS (HCC): Primary | ICD-10-CM

## 2022-06-30 PROCEDURE — 90791 PSYCH DIAGNOSTIC EVALUATION: CPT | Performed by: SOCIAL WORKER

## 2022-06-30 NOTE — PROGRESS NOTES
ADULT BEHAVIORAL HEALTH       Visit Date: 6/30/2022   Time of appointment:  2:00PM  Time spent with Patient: 60 minutes. This is patient's Initial appointment. Reason for Consult:  Stress and depression and anxiety    Referring Provider/PCP:    Young Born, APRN -*  Kenia Quevedo, LUZ - CNP      Pt provided informed consent for the behavioral health program. Discussed with patient model of service to include the limits of confidentiality (i.e. abuse reporting, suicide intervention, etc.) and short-term intervention focused approach. Pt indicated understanding. PRESENTING PROBLEM AND HISTORY  Trish Peck is a 35 y.o. female who presents for new evaluation and treatment of depression. She has the following symptoms: depressed mood, decreased appetite, weight loss, decreased sleep, excessive crying, fatigue/lack of energy, excessive guilt, low self-esteem, isolating self, feelings of worthlessness, feeling unable to make decisions, anger/irritability, feeling nervous, anxious, or on edge, racing worry thoughts, excessive anxiety and worry about specific stressors, excessive worry about a number of events or activities  and inability to stop or control worry. Onset of symptoms was approximately 20 years ago. Symptoms have been gradually worsening since that time. She denies current suicidal and homicidal ideation. Family history significant for depression. Risk factors: none. Previous treatment includes Lexapro. She complains of the following medication side effects: none. MENTAL STATUS EXAM  Mood was constricted with anxious affect. Suicidal ideation was denied. Homicidal ideation was denied. Hygiene was fair . Dress was appropriate. Behavior was Within Normal Limits with No observation of difficulties ambulating. Attitude was Cooperative. Eye-contact was fair. Speech: rate - WNL, rhythm -  WNL, volume - WNL  Verbalizations were coherent.   Thought processes were intact and goal-oriented without evidence of delusions, hallucinations, obsessions, or huan; with little cognitive distortions. Associations were characterized by intact cognitive processes. Pt was oriented to person, place, time, and general circumstances;  recent:  fair. Insight and judgment were estimated to be fair, AEB, a fair  understanding of cyclical maladaptive patterns, and the ability to use insight to inform behavior change. CURRENT MEDICATIONS    Current Outpatient Medications:     escitalopram (LEXAPRO) 10 MG tablet, Take 1 tablet by mouth daily, Disp: 30 tablet, Rfl: 3    carvedilol (COREG) 25 MG tablet, Take 1 tablet by mouth 2 times daily, Disp: 60 tablet, Rfl: 2    vitamin D (ERGOCALCIFEROL) 1.25 MG (23023 UT) CAPS capsule, Take 1 capsule by mouth once a week for 8 doses, Disp: 8 capsule, Rfl: 0    losartan (COZAAR) 100 MG tablet, , Disp: , Rfl:     montelukast (SINGULAIR) 10 MG tablet, TAKE 1 TABLET BY MOUTH DAILY, Disp: 90 tablet, Rfl: 1    potassium chloride (KLOR-CON M) 20 MEQ extended release tablet, TAKE TWO (2) TABLETS BY MOUTH ONCE DAILY, Disp: 60 tablet, Rfl: 5    amLODIPine (NORVASC) 10 MG tablet, Take 1 tablet by mouth daily, Disp: 90 tablet, Rfl: 1    ferrous sulfate (IRON 325) 325 (65 Fe) MG tablet, Take 1 tablet by mouth daily (with breakfast), Disp: 90 tablet, Rfl: 1    Multiple Vitamins-Minerals (MULTIVITAMIN WITH MINERALS) tablet, Take 2 tablets by mouth daily, Disp: 30 tablet, Rfl: 0     FAMILY MEDICAL/MH HISTORY   Her family history includes Arthritis in her maternal grandmother; Asthma in her maternal uncle and mother; Hypertension in her maternal aunt, maternal grandmother, maternal uncle, and mother; Stroke in her maternal grandmother. PATIENT MENTAL HEALTH HISTORY:  Pt noted just starting Lexapro. Pt has never been in therapy before. PSYCHOSOCIAL HISTORY  Current living situation: Pt noted living in a house she is buying. Pt noted having two sons (15, 7). Not living in the home (son Donny) (daughter [address] [facility])  Pt noted her Childrens father has never been very involved. Pt noted having a friend who she is dating. 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 Support system: Pt noted having a big family and many of them are local.  Pt noted having a friend. Restorationist/Spirituality: Pt reports being a [place]. DRUG AND ALCOHOL CURRENT USE/HISTORY  TOBACCO:  She reports that she has never smoked. She has never used smokeless tobacco.  ALCOHOL:  She reports no history of alcohol use. OTHER SUBSTANCES: She reports no history of drug use. ASSESSMENT  Ethan Delgado presented to the appointment today for evaluation and treatment of symptoms of    Diagnosis Orders   1. Major depressive disorder, recurrent episode, moderate with anxious distress ([address]          She is currently deemed no risk to herself or others and meets criteria for major depressive disorder recurrent moderate with anxious distress. She will benefit from continued medication evaluation to assess if depression medications could be helpful in treating depression symptoms. Sydney's depression and anxiety symptoms are not well controlled at this time. She will also benefit from brief and solution-focused consultation to address cognitive and behavioral interventions for depression and anxeity symptoms. Yin Johnson was in agreement with recommendations. PHQ Scores 6/27/2022 6/14/2022 6/7/2022 5/9/2022 3/15/2022 6/23/2021 4/29/2021   PHQ2 Score 6 0 0 0 0 0 0   PHQ9 Score 13 0 0 0 0 0 0     Interpretation of Total Score Depression Severity: 1-4 = Minimal depression, 5-9 = Mild depression, 10-14 = Moderate depression, 15-19 = Moderately severe depression, 20-27 = Severe depression    How often pt has had thoughts of death or hurting self (if PHQ positive for depression):       No flowsheet data found.   Interpretation of NEISHA-7 score: 5-9 = mild anxiety, 10-14 = moderate anxiety, 15+ = severe anxiety. Recommend referral to behavioral health for scores 10 or greater. DIAGNOSIS/Plan  1. Major depressive disorder, recurrent episode, moderate with anxious distress ([facility]       Return in about 2 weeks (around 7/14/2022). INTERVENTION  Trained in relaxation strategies, Provided education, Established rapport, Supportive techniques and CBT to target depression and anxiety    INTERACTIVE COMPLEXITY  Is interactive complexity present?   No  Reason:  N/A  Additional Supporting Information:  N/A     Electronically signed by RICHARDSON Smith on 6/30/22 at 2:09 PM EDT

## 2022-06-30 NOTE — Clinical Note
Pt meets DSM5 criteria for Major depressive disorder recurrent moderate with anxious distress. Pt agreed to attend therapy with [facility] starting off bi-weekly.

## 2022-07-07 ENCOUNTER — TELEPHONE (OUTPATIENT)
Dept: FAMILY MEDICINE CLINIC | Age: 42
End: 2022-07-07

## 2022-07-07 NOTE — TELEPHONE ENCOUNTER
Pt advised, she stated no she doesn't think she can, she wants to see/talk to you first before she goes back

## 2022-07-07 NOTE — TELEPHONE ENCOUNTER
The medication takes 6-8 weeks for therapeutic relief but I cannot keep her off for that amount of time. Right now I have her RTW on the 11th , she doesn't think she can go back then?

## 2022-07-07 NOTE — TELEPHONE ENCOUNTER
Patient would like to stay off work  Until she see you on 7/15/22 ,  Patient think she needs more time on the medication you gave her. Please let her know? appt with Caro Curry 7/13/22  Appt.  With you 7/15/22

## 2022-07-14 ENCOUNTER — TELEPHONE (OUTPATIENT)
Dept: BEHAVIORAL/MENTAL HEALTH CLINIC | Age: 42
End: 2022-07-14

## 2022-07-15 ENCOUNTER — OFFICE VISIT (OUTPATIENT)
Dept: FAMILY MEDICINE CLINIC | Age: 42
End: 2022-07-15
Payer: COMMERCIAL

## 2022-07-15 VITALS
TEMPERATURE: 97.6 F | DIASTOLIC BLOOD PRESSURE: 102 MMHG | WEIGHT: 140.6 LBS | OXYGEN SATURATION: 96 % | HEART RATE: 92 BPM | BODY MASS INDEX: 23.4 KG/M2 | SYSTOLIC BLOOD PRESSURE: 152 MMHG

## 2022-07-15 DIAGNOSIS — D50.9 IRON DEFICIENCY ANEMIA, UNSPECIFIED IRON DEFICIENCY ANEMIA TYPE: ICD-10-CM

## 2022-07-15 DIAGNOSIS — E87.6 HYPOKALEMIA: ICD-10-CM

## 2022-07-15 DIAGNOSIS — Z98.84 S/P LAPAROSCOPIC SLEEVE GASTRECTOMY: ICD-10-CM

## 2022-07-15 DIAGNOSIS — F41.1 GAD (GENERALIZED ANXIETY DISORDER): Primary | ICD-10-CM

## 2022-07-15 DIAGNOSIS — I10 HYPERTENSION, UNSPECIFIED TYPE: ICD-10-CM

## 2022-07-15 PROCEDURE — 81003 URINALYSIS AUTO W/O SCOPE: CPT | Performed by: NURSE PRACTITIONER

## 2022-07-15 PROCEDURE — 99214 OFFICE O/P EST MOD 30 MIN: CPT | Performed by: NURSE PRACTITIONER

## 2022-07-15 RX ORDER — SPIRONOLACTONE 25 MG/1
25 TABLET ORAL DAILY
Qty: 30 TABLET | Refills: 2 | Status: SHIPPED | OUTPATIENT
Start: 2022-07-15 | End: 2022-09-30 | Stop reason: SDUPTHER

## 2022-07-15 ASSESSMENT — PATIENT HEALTH QUESTIONNAIRE - PHQ9
4. FEELING TIRED OR HAVING LITTLE ENERGY: 2
1. LITTLE INTEREST OR PLEASURE IN DOING THINGS: 1
SUM OF ALL RESPONSES TO PHQ QUESTIONS 1-9: 7
6. FEELING BAD ABOUT YOURSELF - OR THAT YOU ARE A FAILURE OR HAVE LET YOURSELF OR YOUR FAMILY DOWN: 0
5. POOR APPETITE OR OVEREATING: 3
7. TROUBLE CONCENTRATING ON THINGS, SUCH AS READING THE NEWSPAPER OR WATCHING TELEVISION: 0
3. TROUBLE FALLING OR STAYING ASLEEP: 0
9. THOUGHTS THAT YOU WOULD BE BETTER OFF DEAD, OR OF HURTING YOURSELF: 0
SUM OF ALL RESPONSES TO PHQ9 QUESTIONS 1 & 2: 2
2. FEELING DOWN, DEPRESSED OR HOPELESS: 1
10. IF YOU CHECKED OFF ANY PROBLEMS, HOW DIFFICULT HAVE THESE PROBLEMS MADE IT FOR YOU TO DO YOUR WORK, TAKE CARE OF THINGS AT HOME, OR GET ALONG WITH OTHER PEOPLE: 1
SUM OF ALL RESPONSES TO PHQ QUESTIONS 1-9: 7
8. MOVING OR SPEAKING SO SLOWLY THAT OTHER PEOPLE COULD HAVE NOTICED. OR THE OPPOSITE, BEING SO FIGETY OR RESTLESS THAT YOU HAVE BEEN MOVING AROUND A LOT MORE THAN USUAL: 0

## 2022-07-15 ASSESSMENT — ENCOUNTER SYMPTOMS
DIARRHEA: 0
SHORTNESS OF BREATH: 1
GASTROINTESTINAL NEGATIVE: 1
VOMITING: 0
COUGH: 0
WHEEZING: 0
EYES NEGATIVE: 1
COLOR CHANGE: 0
CHEST TIGHTNESS: 1
NAUSEA: 0
ALLERGIC/IMMUNOLOGIC NEGATIVE: 1

## 2022-07-15 NOTE — PROGRESS NOTES
[facility]  [address] [place] [address]  Dept: [phone]    Pato Walker is a 35 y.o. female who presents today for her medical conditions/complaintsas noted below. Pato Walker is here today c/o Anxiety (F/u)    Past Medical History:   Diagnosis Date    GERD (gastroesophageal reflux disease)     Hyperlipidemia     not on rx    Hypertension     managed per pcp    Kidney stone     Major depressive disorder, recurrent episode, mild ([address] 10/21/2015    Palmira Hollingsworth    Obesity, Class III, BMI 40-49.9 (morbid obesity) (HCC)     TRISHA (obstructive sleep apnea)     Saw Dr. Germán Castellanos for clearance for surgery    Pre-diabetes     diet control    Presence of tooth-root and mandibular implants     all top teeth    Tachycardia     per pt.  Saw Dr. Sal Coleman 2020    Wears contact lenses     Instructed not to wear contacts for surgery      Past Surgical History:   Procedure Laterality Date     SECTION      x1    LEG AMPUTATION  2021    LAPAROSCOPIC ROBOTIC GASTRECTOMY SLEEVE, HIATAL HERNIA REPAIR, EGD     LEG AMPUTATION N/A 2021    XI LAPAROSCOPIC ROBOTIC GASTRECTOMY SLEEVE, HIATAL HERNIA REPAIR, EGD - GI UNIT SCHEDULED. performed by Saran Sprague DO at [address] [place] [address] [address] N/A 2021    EGD BIOPSY performed by Saran Sprague DO at [facility]       Family History   Problem Relation Age of Onset    Hypertension Mother     Asthma Mother     Hypertension Maternal Aunt     Asthma Maternal Uncle     Hypertension Maternal Uncle     Arthritis Maternal Grandmother     Hypertension Maternal Grandmother     Stroke Maternal Grandmother     Breast Cancer Neg Hx     Cancer Neg Hx     Colon Cancer Neg Hx     Diabetes Neg Hx     Eclampsia Neg Hx     Ovarian Cancer Neg Hx      Labor Neg Hx     Spont Abortions Neg Hx        Social History     Tobacco Use    Smoking status: Never    Smokeless tobacco: Never   Substance Use heard.    No friction rub. No gallop. Pulmonary:      Effort: Pulmonary effort is normal. No tachypnea, accessory muscle usage or respiratory distress. Breath sounds: Normal breath sounds. No stridor. No decreased breath sounds, wheezing, rhonchi or rales. Abdominal:      Palpations: Abdomen is soft. Musculoskeletal:         General: No tenderness or deformity. Normal range of motion. Cervical back: Normal range of motion and neck supple. Skin:     General: Skin is warm and dry. Coloration: Skin is not pale. Findings: No erythema or rash. Neurological:      Mental Status: She is alert and oriented to person, place, and time. GCS: GCS eye subscore is 4. GCS verbal subscore is 5. GCS motor subscore is 6. Gait: Gait is intact. Gait normal.   Psychiatric:         Speech: Speech normal.         Behavior: Behavior normal.         Thought Content: Thought content normal.         Judgment: Judgment normal.         Assessment:         1. NEISHA (generalized anxiety disorder)    2. Hypertension, unspecified type    3. Hypokalemia    4. Iron deficiency anemia, unspecified iron deficiency anemia type    5. S/P laparoscopic sleeve gastrectomy        Plan:     1. NEISHA (generalized anxiety disorder)    Continue Lexapro & counseling  Will update FMLA  F/u 3 weeks     2. Hypertension, unspecified type    - Aldosterone; Future  - Catecholamines, Plasma Fractionated; Future  - Cortisol Total; Future  - ECHO Complete 2D W Doppler W Color; Future  - Metanephrines Plasma Free; Future  - Microalbumin, Ur; Future  - Renin; Future  - Urinalysis; Future  - VL Renal Arterial Duplex Complete; Future  - spironolactone (ALDACTONE) 25 MG tablet; Take 1 tablet by mouth in the morning. Dispense: 30 tablet; Refill: 2  - Comprehensive Metabolic Panel;  Future    R/o secondary HTN due to uncontrolled BP on triple med therapy s/p weight loss surgery  STOP K, START spironolactone   Check labs 1 week   Encouraged home BP monitoring for goal < 140/90  F/u 3 weeks     3. Hypokalemia    - Comprehensive Metabolic Panel; Future    D/c K, start spironolactone  Update labs 1 week     4. Iron deficiency anemia, unspecified iron deficiency anemia type    Has upcoming apt with Heme     5. S/P laparoscopic sleeve gastrectomy      Discussed use, benefit, and side effects of prescribed medications. All patient questions answered. Pt voiced understanding. Reviewed health maintenance. Instructed to continue current medications, diet and exercise. Patient agreedwith treatment plan. Follow up as directed.      Electronically signed by LUZ Cooley CNP on 7/15/2022

## 2022-07-18 ENCOUNTER — TELEPHONE (OUTPATIENT)
Dept: FAMILY MEDICINE CLINIC | Age: 42
End: 2022-07-18

## 2022-07-18 NOTE — TELEPHONE ENCOUNTER
Left message for patient to call back. Elton Zapata wants to know  what medications she is currently taking. Please get the name of the medication, strength and frequency.

## 2022-07-18 NOTE — TELEPHONE ENCOUNTER
Patient  is home and looked through her medications. She has been taking the Losartan. Palmira's new instructions for the medications are for her to D/C the Potassium completely and start the Spirolactone.  And to do labs 1 week after starting medication changes

## 2022-07-18 NOTE — TELEPHONE ENCOUNTER
Decrease the potassium to once daily (20 MEQ)   Continue carvedilol, amlodipine, vit D  Stop metoprolol - call Cardiologist & tell them to cancel RX   Restart losartan  Get labs done 1 week after making these changes  Why does the list below say potassium twice? She wasn't referring to losartan potassium was she?    Hold off on Spironolactone for now

## 2022-07-18 NOTE — TELEPHONE ENCOUNTER
Patient has been advised of instructions on medications and voiced understanding. She is at work and cannot give me the strengths of the medications, but says they are the ones you prescribed. Her Potassium is ' a large pill' and she thinks it is the 20 MEQ.  She is currently taking that one - BID

## 2022-07-18 NOTE — TELEPHONE ENCOUNTER
Patient is at work right now, info given, give her a cb after for more info    Potassium daily  Carvedilol  Potassium  Amlodipine  Vitamin D3  Metoprolol

## 2022-07-19 ENCOUNTER — HOSPITAL ENCOUNTER (OUTPATIENT)
Facility: MEDICAL CENTER | Age: 42
End: 2022-07-19

## 2022-07-19 ENCOUNTER — INITIAL CONSULT (OUTPATIENT)
Dept: ONCOLOGY | Age: 42
End: 2022-07-19
Payer: COMMERCIAL

## 2022-07-19 ENCOUNTER — TELEPHONE (OUTPATIENT)
Dept: ONCOLOGY | Age: 42
End: 2022-07-19

## 2022-07-19 VITALS
RESPIRATION RATE: 16 BRPM | WEIGHT: 139.6 LBS | HEART RATE: 93 BPM | DIASTOLIC BLOOD PRESSURE: 99 MMHG | HEIGHT: 64 IN | BODY MASS INDEX: 23.83 KG/M2 | TEMPERATURE: 97.6 F | SYSTOLIC BLOOD PRESSURE: 145 MMHG

## 2022-07-19 DIAGNOSIS — D64.9 NORMOCYTIC ANEMIA: Primary | ICD-10-CM

## 2022-07-19 DIAGNOSIS — K90.9 IRON MALABSORPTION: ICD-10-CM

## 2022-07-19 DIAGNOSIS — D50.8 IRON DEFICIENCY ANEMIA SECONDARY TO INADEQUATE DIETARY IRON INTAKE: ICD-10-CM

## 2022-07-19 PROCEDURE — G8427 DOCREV CUR MEDS BY ELIG CLIN: HCPCS | Performed by: INTERNAL MEDICINE

## 2022-07-19 PROCEDURE — 99245 OFF/OP CONSLTJ NEW/EST HI 55: CPT | Performed by: INTERNAL MEDICINE

## 2022-07-19 PROCEDURE — 99202 OFFICE O/P NEW SF 15 MIN: CPT | Performed by: INTERNAL MEDICINE

## 2022-07-19 PROCEDURE — G8420 CALC BMI NORM PARAMETERS: HCPCS | Performed by: INTERNAL MEDICINE

## 2022-07-19 RX ORDER — EPINEPHRINE 1 MG/ML
0.3 INJECTION, SOLUTION, CONCENTRATE INTRAVENOUS PRN
Status: CANCELLED | OUTPATIENT
Start: 2022-07-28

## 2022-07-19 RX ORDER — ALBUTEROL SULFATE 90 UG/1
4 AEROSOL, METERED RESPIRATORY (INHALATION) PRN
Status: CANCELLED | OUTPATIENT
Start: 2022-07-28

## 2022-07-19 RX ORDER — SODIUM CHLORIDE 9 MG/ML
5-250 INJECTION, SOLUTION INTRAVENOUS PRN
Status: CANCELLED | OUTPATIENT
Start: 2022-07-28

## 2022-07-19 RX ORDER — ONDANSETRON 2 MG/ML
8 INJECTION INTRAMUSCULAR; INTRAVENOUS
Status: CANCELLED | OUTPATIENT
Start: 2022-07-28

## 2022-07-19 RX ORDER — SODIUM CHLORIDE 0.9 % (FLUSH) 0.9 %
5-40 SYRINGE (ML) INJECTION PRN
Status: CANCELLED | OUTPATIENT
Start: 2022-07-28

## 2022-07-19 RX ORDER — DIPHENHYDRAMINE HYDROCHLORIDE 50 MG/ML
50 INJECTION INTRAMUSCULAR; INTRAVENOUS
Status: CANCELLED | OUTPATIENT
Start: 2022-07-28

## 2022-07-19 RX ORDER — HEPARIN SODIUM (PORCINE) LOCK FLUSH IV SOLN 100 UNIT/ML 100 UNIT/ML
500 SOLUTION INTRAVENOUS PRN
Status: CANCELLED | OUTPATIENT
Start: 2022-07-28

## 2022-07-19 RX ORDER — ACETAMINOPHEN 325 MG/1
650 TABLET ORAL
Status: CANCELLED | OUTPATIENT
Start: 2022-07-28

## 2022-07-19 RX ORDER — SODIUM CHLORIDE 9 MG/ML
INJECTION, SOLUTION INTRAVENOUS CONTINUOUS
Status: CANCELLED | OUTPATIENT
Start: 2022-07-28

## 2022-07-19 RX ORDER — FAMOTIDINE 10 MG/ML
20 INJECTION, SOLUTION INTRAVENOUS
Status: CANCELLED | OUTPATIENT
Start: 2022-07-28

## 2022-07-19 NOTE — PROGRESS NOTES
_           Ms. Rodger Benitez is a very pleasant 35 y.o. female with history of multiple co morbidities as listed. Patient is referred for further management of anemia. Patient had history of morbid obesity. She had bariatric surgery 2021. She lost 106 pounds. Patient had history of anemia before the surgery. Anemia was related to heavy menses. Patient was previously treated with oral iron with no improvement. She has significant side effects to oral iron. Recently her symptoms are getting worse since the time of her bariatric surgery. She is having increasing weakness and fatigue. No dizziness. She has shortness of breath on exertion. Patient has muscle cramps and she is craving ice. She denies any melena or hematochezia. No hematemesis. Patient is non-smoker. Social alcohol drinking. Yamile Hernandez PAST MEDICAL HISTORY: has a past medical history of GERD (gastroesophageal reflux disease), Hyperlipidemia, Hypertension, Kidney stone, Major depressive disorder, recurrent episode, mild ([address] Obesity, Class III, BMI 40-49.9 (morbid obesity) ([address] TRISHA (obstructive sleep apnea), Pre-diabetes, Presence of tooth-root and mandibular implants, Tachycardia, and Wears contact lenses. PAST SURGICAL HISTORY: has a past surgical history that includes  section; Upper gastrointestinal endoscopy (N/A, 2021); Tonsillectomy; Sleeve Gastrectomy (2021); and Sleeve Gastrectomy (N/A, 2021). CURRENT MEDICATIONS:  has a current medication list which includes the following prescription(s): spironolactone, escitalopram, carvedilol, vitamin d, losartan, montelukast, amlodipine, ferrous sulfate, and multivitamin with minerals. ALLERGIES:  has No Known Allergies. FAMILY HISTORY: Negative for any hematological or oncological conditions. SOCIAL HISTORY:  reports that she has never smoked.  She has never used smokeless tobacco. She reports that she does not drink alcohol and does not use drugs. REVIEW OF SYSTEMS:     General: Positive for weakness and fatigue. No unanticipated weight loss or decreased appetite. No fever or chills. Eyes: No blurred vision, eye pain or double vision. Ears: No hearing problems or drainage. No tinnitus. Throat: No sore throat, problems with swallowing or dysphagia. Respiratory: No cough, sputum or hemoptysis. No shortness of breath. No pleuritic chest pain. Cardiovascular: No chest pain, orthopnea or PND. No lower extremity edema. No palpitation. Gastrointestinal: No problems with swallowing. No abdominal pain or bloating. No nausea or vomiting. No diarrhea or constipation. No GI bleeding. Genitourinary: No dysuria, hematuria, frequency or urgency. Musculoskeletal: No muscle aches or pains. No limitation of movement. No back pain. No gait disturbance, No joint complaints. Dermatologic: No skin rashes or pruritus. No skin lesions or discolorations. Psychiatric: No depression, anxiety, or stress or signs of schizophrenia. No change in mood or affect. Hematologic: No history of bleeding tendency. No bruises or ecchymosis. No history of clotting problems. Infectious disease: No fever, chills or frequent infections. Endocrine: No polydipsia or polyuria. No temperature intolerance. Neurologic: No headaches or dizziness. No weakness or numbness of the extremities. No changes in balance, coordination,  memory, mentation, behavior. Allergic/Immunologic: No nasal congestion or hives. No repeated infections. PHYSICAL EXAM:  The patient is not in acute distress. Vital signs: Blood pressure (!) 145/99, pulse 93, temperature 97.6 °F (36.4 °C), temperature source Temporal, resp. rate 16, height 5' 4\" (1.626 m), weight 139 lb 9.6 oz (63.3 kg), not currently breastfeeding.      General appearance - well appearing, not in pain or distress  Mental status - good mood, alert and oriented  Eyes - pupils equal and reactive, extraocular eye movements intact  Ears - bilateral TM's and external ear canals normal  Nose - normal and patent, no erythema, discharge or polyps  Mouth - mucous membranes moist, pharynx normal without lesions  Neck - supple, no significant adenopathy  Lymphatics - no palpable lymphadenopathy, no hepatosplenomegaly  Chest - clear to auscultation, no wheezes, rales or rhonchi, symmetric air entry  Heart - normal rate, regular rhythm, normal S1, S2, no murmurs, rubs, clicks or gallops  Abdomen - soft, nontender, nondistended, no masses or organomegaly  Neurological - alert, oriented, normal speech, no focal findings or movement disorder noted  Musculoskeletal - no joint tenderness, deformity or swelling  Extremities - peripheral pulses normal, no pedal edema, no clubbing or cyanosis  Skin - normal coloration and turgor, no rashes, no suspicious skin lesions noted     Review of Diagnostic data:   Lab Results   Component Value Date    WBC 4.3 06/04/2022    HGB 9.4 (L) 06/04/2022    HCT 31.6 (L) 06/04/2022    MCV 84.9 06/04/2022     06/04/2022       Chemistry        Component Value Date/Time     06/04/2022 0928    K 3.8 06/04/2022 0928     06/04/2022 0928    CO2 25 06/04/2022 0928    BUN 11 06/04/2022 0928    CREATININE 0.70 06/04/2022 0928        Component Value Date/Time    CALCIUM 9.2 06/04/2022 0928    ALKPHOS 43 06/04/2022 0928    AST 13 06/04/2022 0928    ALT 8 06/04/2022 0928    BILITOT 0.43 06/04/2022 0928              IMPRESSION:   Normocytic anemia  Iron deficiency anemia  Iron malabsorption  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 Status post gastric sleeve April 2021. History of menorrhagia. PLAN: Records, labs and images were reviewed and discussed with the patient. I explained to the patient the nature of this problem with anemia related to iron deficiency secondary to malabsorption and impart heavy menses.

## 2022-07-19 NOTE — TELEPHONE ENCOUNTER
SANTY HERE FOR CONSULTATION  IV iron infusion soon  RV 4-6 months with CBC, Iron studies before   NEW ORDERS IS PENDING [facility]EV[facility] FLORES/ JOS[facility] MANUEL   PT DOESN'T WANT TO GO TO ANY OTHER HOSPITAL SHE IS WILLING TO WAIT UNTIL AUGUST FOR IRON INFUSION  LABS CDP FERRITIN FE TIBC ORDERS MAILED TO PT TO BE DONE ON 11/8/22  MD VISIT 11/15/22 @ 3:15PM  AVS PRINTED W/ INSTRUCTIONS AND GIVEN TO PT ON EXIT

## 2022-07-21 ENCOUNTER — TELEPHONE (OUTPATIENT)
Dept: FAMILY MEDICINE CLINIC | Age: 42
End: 2022-07-21

## 2022-07-21 ENCOUNTER — TELEPHONE (OUTPATIENT)
Dept: INFUSION THERAPY | Facility: MEDICAL CENTER | Age: 42
End: 2022-07-21

## 2022-07-21 DIAGNOSIS — D64.9 NORMOCYTIC ANEMIA: Primary | ICD-10-CM

## 2022-07-21 NOTE — TELEPHONE ENCOUNTER
New order 7/19/22  Dr. Lakshmi Wheeler  Dx: iron malabsorption; iron deficiency anemia secondary to inadequate dietary iron intake  Injectafer 750 mg IV once a week at least 7 days apart for 2 doses  Order noted and chart to front for processing; kardex updated.

## 2022-07-21 NOTE — TELEPHONE ENCOUNTER
We have up to 2 weeks to complete forms. I have not had a chance to complete them yet. We will call her when they're ready.

## 2022-07-28 ENCOUNTER — HOSPITAL ENCOUNTER (OUTPATIENT)
Dept: INFUSION THERAPY | Facility: MEDICAL CENTER | Age: 42
Discharge: HOME | End: 2022-07-28
Payer: COMMERCIAL

## 2022-07-28 VITALS
TEMPERATURE: 98.3 F | RESPIRATION RATE: 16 BRPM | HEART RATE: 91 BPM | SYSTOLIC BLOOD PRESSURE: 167 MMHG | DIASTOLIC BLOOD PRESSURE: 96 MMHG

## 2022-07-28 DIAGNOSIS — D50.8 IRON DEFICIENCY ANEMIA SECONDARY TO INADEQUATE DIETARY IRON INTAKE: ICD-10-CM

## 2022-07-28 DIAGNOSIS — K90.9 IRON MALABSORPTION: Primary | ICD-10-CM

## 2022-07-28 PROCEDURE — 6360000002 HC RX W HCPCS: Performed by: INTERNAL MEDICINE

## 2022-07-28 PROCEDURE — 2580000003 HC RX 258: Performed by: INTERNAL MEDICINE

## 2022-07-28 PROCEDURE — 96365 THER/PROPH/DIAG IV INF INIT: CPT

## 2022-07-28 RX ORDER — SODIUM CHLORIDE 9 MG/ML
INJECTION, SOLUTION INTRAVENOUS CONTINUOUS
Status: CANCELLED | OUTPATIENT
Start: 2022-08-04

## 2022-07-28 RX ORDER — HEPARIN SODIUM (PORCINE) LOCK FLUSH IV SOLN 100 UNIT/ML 100 UNIT/ML
500 SOLUTION INTRAVENOUS PRN
Status: CANCELLED | OUTPATIENT
Start: 2022-08-04

## 2022-07-28 RX ORDER — DIPHENHYDRAMINE HYDROCHLORIDE 50 MG/ML
50 INJECTION INTRAMUSCULAR; INTRAVENOUS
Status: CANCELLED | OUTPATIENT
Start: 2022-08-04

## 2022-07-28 RX ORDER — ALBUTEROL SULFATE 90 UG/1
4 AEROSOL, METERED RESPIRATORY (INHALATION) PRN
Status: CANCELLED | OUTPATIENT
Start: 2022-08-04

## 2022-07-28 RX ORDER — SODIUM CHLORIDE 9 MG/ML
5-250 INJECTION, SOLUTION INTRAVENOUS PRN
Status: CANCELLED | OUTPATIENT
Start: 2022-08-04

## 2022-07-28 RX ORDER — SODIUM CHLORIDE 9 MG/ML
5-250 INJECTION, SOLUTION INTRAVENOUS PRN
Status: DISCONTINUED | OUTPATIENT
Start: 2022-07-28 | End: 2022-07-29 | Stop reason: HOSPADM

## 2022-07-28 RX ORDER — ACETAMINOPHEN 325 MG/1
650 TABLET ORAL
Status: CANCELLED | OUTPATIENT
Start: 2022-08-04

## 2022-07-28 RX ORDER — FAMOTIDINE 10 MG/ML
20 INJECTION, SOLUTION INTRAVENOUS
Status: CANCELLED | OUTPATIENT
Start: 2022-08-04

## 2022-07-28 RX ORDER — ONDANSETRON 2 MG/ML
8 INJECTION INTRAMUSCULAR; INTRAVENOUS
Status: CANCELLED | OUTPATIENT
Start: 2022-08-04

## 2022-07-28 RX ORDER — SODIUM CHLORIDE 0.9 % (FLUSH) 0.9 %
5-40 SYRINGE (ML) INJECTION PRN
Status: CANCELLED | OUTPATIENT
Start: 2022-08-04

## 2022-07-28 RX ADMIN — FERRIC CARBOXYMALTOSE INJECTION 750 MG: 50 INJECTION, SOLUTION INTRAVENOUS at 14:36

## 2022-07-28 RX ADMIN — SODIUM CHLORIDE 250 ML/HR: 9 INJECTION, SOLUTION INTRAVENOUS at 14:20

## 2022-07-28 NOTE — PROGRESS NOTES
Patient arrived ambulatory per self for 1/2 Injectafer infusions. Patient denies complaints or concerns. IV inserted per unit policy. Printed education provided to patient about Injectafer. Injectafer infusion initiated. Approximately 5 minutes into infusion patient complains of aching around insertion site. IV patent. Infusion rate decreased slightly. Patient soon felt relief. Remainder of infusion completed with no sign adverse reaction;line flushed. Patient observed for 30 minutes post infusion with running IV fluids. No sign adverse reaction. IV removed with pressure dressing applied. Patient ambulated off unit per self at discharge. Printed calendar in hand.

## 2022-08-04 ENCOUNTER — HOSPITAL ENCOUNTER (OUTPATIENT)
Dept: INFUSION THERAPY | Facility: MEDICAL CENTER | Age: 42
Discharge: HOME | End: 2022-08-04
Payer: COMMERCIAL

## 2022-08-04 DIAGNOSIS — D50.8 IRON DEFICIENCY ANEMIA SECONDARY TO INADEQUATE DIETARY IRON INTAKE: ICD-10-CM

## 2022-08-04 DIAGNOSIS — K90.9 IRON MALABSORPTION: Primary | ICD-10-CM

## 2022-08-04 PROCEDURE — 2580000003 HC RX 258: Performed by: INTERNAL MEDICINE

## 2022-08-04 PROCEDURE — 96365 THER/PROPH/DIAG IV INF INIT: CPT

## 2022-08-04 PROCEDURE — 6360000002 HC RX W HCPCS: Performed by: INTERNAL MEDICINE

## 2022-08-04 RX ORDER — HEPARIN SODIUM (PORCINE) LOCK FLUSH IV SOLN 100 UNIT/ML 100 UNIT/ML
500 SOLUTION INTRAVENOUS PRN
OUTPATIENT
Start: 2022-08-11

## 2022-08-04 RX ORDER — DIPHENHYDRAMINE HYDROCHLORIDE 50 MG/ML
50 INJECTION INTRAMUSCULAR; INTRAVENOUS
OUTPATIENT
Start: 2022-08-11

## 2022-08-04 RX ORDER — FAMOTIDINE 10 MG/ML
20 INJECTION, SOLUTION INTRAVENOUS
OUTPATIENT
Start: 2022-08-11

## 2022-08-04 RX ORDER — SODIUM CHLORIDE 0.9 % (FLUSH) 0.9 %
5-40 SYRINGE (ML) INJECTION PRN
OUTPATIENT
Start: 2022-08-11

## 2022-08-04 RX ORDER — ONDANSETRON 2 MG/ML
8 INJECTION INTRAMUSCULAR; INTRAVENOUS
OUTPATIENT
Start: 2022-08-11

## 2022-08-04 RX ORDER — ALBUTEROL SULFATE 90 UG/1
4 AEROSOL, METERED RESPIRATORY (INHALATION) PRN
OUTPATIENT
Start: 2022-08-11

## 2022-08-04 RX ORDER — ACETAMINOPHEN 325 MG/1
650 TABLET ORAL
OUTPATIENT
Start: 2022-08-11

## 2022-08-04 RX ORDER — SODIUM CHLORIDE 9 MG/ML
5-250 INJECTION, SOLUTION INTRAVENOUS PRN
Status: DISCONTINUED | OUTPATIENT
Start: 2022-08-04 | End: 2022-08-05 | Stop reason: HOSPADM

## 2022-08-04 RX ORDER — SODIUM CHLORIDE 9 MG/ML
5-250 INJECTION, SOLUTION INTRAVENOUS PRN
OUTPATIENT
Start: 2022-08-11

## 2022-08-04 RX ORDER — SODIUM CHLORIDE 9 MG/ML
5-250 INJECTION, SOLUTION INTRAVENOUS PRN
Status: CANCELLED | OUTPATIENT
Start: 2022-08-11

## 2022-08-04 RX ORDER — SODIUM CHLORIDE 9 MG/ML
INJECTION, SOLUTION INTRAVENOUS CONTINUOUS
OUTPATIENT
Start: 2022-08-11

## 2022-08-04 RX ADMIN — FERRIC CARBOXYMALTOSE INJECTION 750 MG: 50 INJECTION, SOLUTION INTRAVENOUS at 14:27

## 2022-08-04 RX ADMIN — SODIUM CHLORIDE 250 ML/HR: 9 INJECTION, SOLUTION INTRAVENOUS at 14:10

## 2022-08-04 NOTE — PROGRESS NOTES
Patient arrived ambulatory per self for 2/2 Injectafer infusion. Patient denies complaints or concerns. IV inserted per unit policy. Injectafer infused with no sign adverse reaction;line flushed. IV removed with pressure dressing applied. Patient ambulated off unit per self at discharge.

## 2022-08-25 DIAGNOSIS — J30.9 ALLERGIC RHINITIS, UNSPECIFIED SEASONALITY, UNSPECIFIED TRIGGER: ICD-10-CM

## 2022-08-25 DIAGNOSIS — R94.2 ABNORMAL PFTS (PULMONARY FUNCTION TESTS): ICD-10-CM

## 2022-08-25 DIAGNOSIS — E55.9 VITAMIN D DEFICIENCY: ICD-10-CM

## 2022-08-25 RX ORDER — MONTELUKAST SODIUM 10 MG/1
10 TABLET ORAL DAILY
Qty: 90 TABLET | Refills: 1 | Status: SHIPPED | OUTPATIENT
Start: 2022-08-25

## 2022-08-25 NOTE — TELEPHONE ENCOUNTER
Carina Loomis is calling to request a refill on the following medication(s):    Medication Request:  Requested Prescriptions     Pending Prescriptions Disp Refills    montelukast (SINGULAIR) 10 MG tablet [Pharmacy Med Name: MONTELUKAST 10 MG TAB 10 Tablet] 30 tablet 3     Sig: TAKE 1 TABLET BY MOUTH DAILY       Last Visit Date (If Applicable):  2/09/2490    Next Visit Date:    8/25/2022

## 2022-08-26 RX ORDER — ERGOCALCIFEROL 1.25 MG/1
CAPSULE ORAL
Qty: 4 CAPSULE | Refills: 10 | OUTPATIENT
Start: 2022-08-26

## 2022-08-31 DIAGNOSIS — E55.9 VITAMIN D DEFICIENCY: ICD-10-CM

## 2022-09-01 RX ORDER — ERGOCALCIFEROL 1.25 MG/1
CAPSULE ORAL
Qty: 4 CAPSULE | Refills: 10 | OUTPATIENT
Start: 2022-09-01

## 2022-09-06 DIAGNOSIS — E55.9 VITAMIN D DEFICIENCY: ICD-10-CM

## 2022-09-06 RX ORDER — ERGOCALCIFEROL 1.25 MG/1
CAPSULE ORAL
Qty: 4 CAPSULE | Refills: 10 | OUTPATIENT
Start: 2022-09-06

## 2022-09-14 ENCOUNTER — TELEPHONE (OUTPATIENT)
Dept: FAMILY MEDICINE CLINIC | Age: 42
End: 2022-09-14

## 2022-09-14 NOTE — TELEPHONE ENCOUNTER
Patient called. She just switched pharmacy's and they sent her her medications and she was going over them and one looked unfamiliar to her. Losartan (Cozaar). Filled on 3/24/22, no instructions on how to take it in her chart, it was on the bottle, and it was prescribed by a no named provider. She said she will not take this one but the others she will as directed. She does have an appointment on 9/30. But if you need to reach out to you before then, please give her a call back. Thank you.

## 2022-09-15 DIAGNOSIS — E55.9 VITAMIN D DEFICIENCY: ICD-10-CM

## 2022-09-15 RX ORDER — ERGOCALCIFEROL 1.25 MG/1
CAPSULE ORAL
Qty: 4 CAPSULE | Refills: 10 | OUTPATIENT
Start: 2022-09-15

## 2022-09-28 ENCOUNTER — HOSPITAL ENCOUNTER (OUTPATIENT)
Age: 42
Setting detail: SPECIMEN
Discharge: HOME | End: 2022-09-28
Payer: COMMERCIAL

## 2022-09-28 DIAGNOSIS — I10 HYPERTENSION, UNSPECIFIED TYPE: ICD-10-CM

## 2022-09-28 DIAGNOSIS — D64.9 NORMOCYTIC ANEMIA: ICD-10-CM

## 2022-09-28 DIAGNOSIS — E87.6 HYPOKALEMIA: ICD-10-CM

## 2022-09-28 LAB
ABSOLUTE EOS #: 0.09 K/UL (ref 0–0.44)
ABSOLUTE IMMATURE GRANULOCYTE: <0.03 K/UL (ref 0–0.3)
ABSOLUTE LYMPH #: 1.71 K/UL (ref 1.1–3.7)
ABSOLUTE MONO #: 0.37 K/UL (ref 0.1–1.2)
ALBUMIN SERPL-MCNC: 4.3 G/DL (ref 3.5–5.2)
ALBUMIN/GLOBULIN RATIO: 1.2 (ref 1–2.5)
ALP BLD-CCNC: 52 U/L (ref 35–104)
ALT SERPL-CCNC: 8 U/L (ref 5–33)
AMORPHOUS: ABNORMAL
ANION GAP SERPL CALCULATED.3IONS-SCNC: 9 MMOL/L (ref 9–17)
AST SERPL-CCNC: 13 U/L
BACTERIA: ABNORMAL
BASOPHILS # BLD: 1 % (ref 0–2)
BASOPHILS ABSOLUTE: 0.03 K/UL (ref 0–0.2)
BILIRUB SERPL-MCNC: 0.5 MG/DL (ref 0.3–1.2)
BILIRUBIN URINE: NEGATIVE
BUN BLDV-MCNC: 9 MG/DL (ref 6–20)
CALCIUM SERPL-MCNC: 9.3 MG/DL (ref 8.6–10.4)
CHLORIDE BLD-SCNC: 104 MMOL/L (ref 98–107)
CO2: 27 MMOL/L (ref 20–31)
COLOR: YELLOW
CORTISOL COLLECTION INFO: NORMAL
CORTISOL: 6.9 UG/DL (ref 2.7–18.4)
CREAT SERPL-MCNC: 0.68 MG/DL (ref 0.5–0.9)
CREATININE URINE: 105.8 MG/DL (ref 28–217)
EOSINOPHILS RELATIVE PERCENT: 2 % (ref 1–4)
EPITHELIAL CELLS UA: ABNORMAL /HPF (ref 0–5)
FERRITIN: 263 NG/ML (ref 13–150)
GFR AFRICAN AMERICAN: >60 ML/MIN
GFR NON-AFRICAN AMERICAN: >60 ML/MIN
GFR SERPL CREATININE-BSD FRML MDRD: NORMAL ML/MIN/{1.73_M2}
GLUCOSE BLD-MCNC: 84 MG/DL (ref 70–99)
GLUCOSE URINE: NEGATIVE
HCT VFR BLD CALC: 36.9 % (ref 36.3–47.1)
HEMOGLOBIN: 12.2 G/DL (ref 11.9–15.1)
IMMATURE GRANULOCYTES: 0 %
IRON SATURATION: 51 % (ref 20–55)
IRON: 114 UG/DL (ref 37–145)
KETONES, URINE: NEGATIVE
LEUKOCYTE ESTERASE, URINE: NEGATIVE
LYMPHOCYTES # BLD: 40 % (ref 24–43)
MCH RBC QN AUTO: 27.9 PG (ref 25.2–33.5)
MCHC RBC AUTO-ENTMCNC: 33.1 G/DL (ref 28.4–34.8)
MCV RBC AUTO: 84.4 FL (ref 82.6–102.9)
MICROALBUMIN/CREAT 24H UR: <12 MG/L
MICROALBUMIN/CREAT UR-RTO: NORMAL MCG/MG CREAT
MONOCYTES # BLD: 9 % (ref 3–12)
NITRITE, URINE: NEGATIVE
NRBC AUTOMATED: 0 PER 100 WBC
PDW BLD-RTO: 16 % (ref 11.8–14.4)
PH UA: 8 (ref 5–8)
PLATELET # BLD: 300 K/UL (ref 138–453)
PMV BLD AUTO: 9.3 FL (ref 8.1–13.5)
POTASSIUM SERPL-SCNC: 3.8 MMOL/L (ref 3.7–5.3)
PROTEIN UA: NEGATIVE
RBC # BLD: 4.37 M/UL (ref 3.95–5.11)
RBC # BLD: ABNORMAL 10*6/UL
RBC UA: ABNORMAL /HPF (ref 0–2)
SEG NEUTROPHILS: 48 % (ref 36–65)
SEGMENTED NEUTROPHILS ABSOLUTE COUNT: 2.07 K/UL (ref 1.5–8.1)
SODIUM BLD-SCNC: 140 MMOL/L (ref 135–144)
SPECIFIC GRAVITY UA: 1.02 (ref 1–1.03)
TOTAL IRON BINDING CAPACITY: 224 UG/DL (ref 250–450)
TOTAL PROTEIN: 7.8 G/DL (ref 6.4–8.3)
TURBIDITY: ABNORMAL
UNSATURATED IRON BINDING CAPACITY: 110 UG/DL (ref 112–347)
URINE HGB: NEGATIVE
UROBILINOGEN, URINE: NORMAL
WBC # BLD: 4.3 K/UL (ref 3.5–11.3)
WBC UA: ABNORMAL /HPF (ref 0–5)

## 2022-09-28 PROCEDURE — 82088 ASSAY OF ALDOSTERONE: CPT

## 2022-09-28 PROCEDURE — 83550 IRON BINDING TEST: CPT

## 2022-09-28 PROCEDURE — 82043 UR ALBUMIN QUANTITATIVE: CPT

## 2022-09-28 PROCEDURE — 83835 ASSAY OF METANEPHRINES: CPT

## 2022-09-28 PROCEDURE — 82570 ASSAY OF URINE CREATININE: CPT

## 2022-09-28 PROCEDURE — 82533 TOTAL CORTISOL: CPT

## 2022-09-28 PROCEDURE — 36415 COLL VENOUS BLD VENIPUNCTURE: CPT

## 2022-09-28 PROCEDURE — 80053 COMPREHEN METABOLIC PANEL: CPT

## 2022-09-28 PROCEDURE — 84244 ASSAY OF RENIN: CPT

## 2022-09-28 PROCEDURE — 83540 ASSAY OF IRON: CPT

## 2022-09-28 PROCEDURE — 81001 URINALYSIS AUTO W/SCOPE: CPT

## 2022-09-28 PROCEDURE — 82728 ASSAY OF FERRITIN: CPT

## 2022-09-28 PROCEDURE — 85025 COMPLETE CBC W/AUTO DIFF WBC: CPT

## 2022-09-28 PROCEDURE — 82384 ASSAY THREE CATECHOLAMINES: CPT

## 2022-09-30 ENCOUNTER — OFFICE VISIT (OUTPATIENT)
Dept: FAMILY MEDICINE CLINIC | Age: 42
End: 2022-09-30
Payer: COMMERCIAL

## 2022-09-30 VITALS
HEART RATE: 86 BPM | BODY MASS INDEX: 22.73 KG/M2 | WEIGHT: 132.4 LBS | TEMPERATURE: 97.6 F | SYSTOLIC BLOOD PRESSURE: 136 MMHG | DIASTOLIC BLOOD PRESSURE: 86 MMHG | OXYGEN SATURATION: 97 %

## 2022-09-30 DIAGNOSIS — I10 ESSENTIAL HYPERTENSION: Primary | ICD-10-CM

## 2022-09-30 DIAGNOSIS — K90.9 IRON MALABSORPTION: ICD-10-CM

## 2022-09-30 DIAGNOSIS — Z12.31 ENCOUNTER FOR SCREENING MAMMOGRAM FOR MALIGNANT NEOPLASM OF BREAST: ICD-10-CM

## 2022-09-30 DIAGNOSIS — Z00.00 HEALTHCARE MAINTENANCE: ICD-10-CM

## 2022-09-30 PROCEDURE — G8420 CALC BMI NORM PARAMETERS: HCPCS | Performed by: NURSE PRACTITIONER

## 2022-09-30 PROCEDURE — 1036F TOBACCO NON-USER: CPT | Performed by: NURSE PRACTITIONER

## 2022-09-30 PROCEDURE — 99214 OFFICE O/P EST MOD 30 MIN: CPT | Performed by: NURSE PRACTITIONER

## 2022-09-30 PROCEDURE — G8427 DOCREV CUR MEDS BY ELIG CLIN: HCPCS | Performed by: NURSE PRACTITIONER

## 2022-09-30 RX ORDER — AMLODIPINE BESYLATE 10 MG/1
10 TABLET ORAL DAILY
Qty: 90 TABLET | Refills: 1 | Status: SHIPPED | OUTPATIENT
Start: 2022-09-30 | End: 2022-10-24

## 2022-09-30 RX ORDER — CARVEDILOL 12.5 MG/1
12.5 TABLET ORAL 2 TIMES DAILY
Qty: 180 TABLET | Refills: 1 | Status: SHIPPED | OUTPATIENT
Start: 2022-09-30

## 2022-09-30 RX ORDER — LOSARTAN POTASSIUM 100 MG/1
100 TABLET ORAL DAILY
Qty: 90 TABLET | Refills: 1 | Status: SHIPPED | OUTPATIENT
Start: 2022-09-30

## 2022-09-30 RX ORDER — SPIRONOLACTONE 25 MG/1
25 TABLET ORAL DAILY
Qty: 90 TABLET | Refills: 1 | Status: SHIPPED | OUTPATIENT
Start: 2022-09-30 | End: 2022-10-24

## 2022-09-30 ASSESSMENT — ENCOUNTER SYMPTOMS
ALLERGIC/IMMUNOLOGIC NEGATIVE: 1
SHORTNESS OF BREATH: 0
DIARRHEA: 0
CHEST TIGHTNESS: 0
NAUSEA: 0
WHEEZING: 0
ABDOMINAL PAIN: 0
CONSTIPATION: 0
GASTROINTESTINAL NEGATIVE: 1
COLOR CHANGE: 0
RESPIRATORY NEGATIVE: 1
COUGH: 0
VOMITING: 0
EYES NEGATIVE: 1
ANAL BLEEDING: 0

## 2022-09-30 ASSESSMENT — PATIENT HEALTH QUESTIONNAIRE - PHQ9
8. MOVING OR SPEAKING SO SLOWLY THAT OTHER PEOPLE COULD HAVE NOTICED. OR THE OPPOSITE, BEING SO FIGETY OR RESTLESS THAT YOU HAVE BEEN MOVING AROUND A LOT MORE THAN USUAL: 0
5. POOR APPETITE OR OVEREATING: 0
4. FEELING TIRED OR HAVING LITTLE ENERGY: 0
3. TROUBLE FALLING OR STAYING ASLEEP: 0
9. THOUGHTS THAT YOU WOULD BE BETTER OFF DEAD, OR OF HURTING YOURSELF: 0
SUM OF ALL RESPONSES TO PHQ9 QUESTIONS 1 & 2: 0
1. LITTLE INTEREST OR PLEASURE IN DOING THINGS: 0
SUM OF ALL RESPONSES TO PHQ QUESTIONS 1-9: 0
SUM OF ALL RESPONSES TO PHQ QUESTIONS 1-9: 0
2. FEELING DOWN, DEPRESSED OR HOPELESS: 0
10. IF YOU CHECKED OFF ANY PROBLEMS, HOW DIFFICULT HAVE THESE PROBLEMS MADE IT FOR YOU TO DO YOUR WORK, TAKE CARE OF THINGS AT HOME, OR GET ALONG WITH OTHER PEOPLE: 0
7. TROUBLE CONCENTRATING ON THINGS, SUCH AS READING THE NEWSPAPER OR WATCHING TELEVISION: 0
SUM OF ALL RESPONSES TO PHQ QUESTIONS 1-9: 0
6. FEELING BAD ABOUT YOURSELF - OR THAT YOU ARE A FAILURE OR HAVE LET YOURSELF OR YOUR FAMILY DOWN: 0
SUM OF ALL RESPONSES TO PHQ QUESTIONS 1-9: 0

## 2022-09-30 NOTE — PROGRESS NOTES
[facility]  [address] [place] [address]  Dept: [phone]    Timothy Armstrong is a 35 y.o. female who presents today for her medical conditions/complaintsas noted below. Timothy Armstrong is here today c/o Medication Check    Past Medical History:   Diagnosis Date    GERD (gastroesophageal reflux disease)     Hyperlipidemia     not on rx    Hypertension     managed per pcp    Kidney stone     Major depressive disorder, recurrent episode, mild ([address] 10/21/2015    Palmira Hollingsworth    Obesity, Class III, BMI 40-49.9 (morbid obesity) (HCC)     TRISHA (obstructive sleep apnea)     Saw Dr. Mohamud Moreno for clearance for surgery    Pre-diabetes     diet control    Presence of tooth-root and mandibular implants     all top teeth    Tachycardia     per pt.  Saw Dr. Jenni Live 2020    Wears contact lenses     Instructed not to wear contacts for surgery      Past Surgical History:   Procedure Laterality Date     SECTION      x1    SLEEVE GASTRECTOMY  2021    LAPAROSCOPIC ROBOTIC GASTRECTOMY SLEEVE, HIATAL HERNIA REPAIR, EGD     SLEEVE GASTRECTOMY N/A 2021    XI LAPAROSCOPIC ROBOTIC GASTRECTOMY SLEEVE, HIATAL HERNIA REPAIR, EGD - GI UNIT SCHEDULED. performed by Krys Jose DO at [address] [place] [address] N/A 2021    EGD BIOPSY performed by Krys Jose DO at [facility]       Family History   Problem Relation Age of Onset    Hypertension Mother     Asthma Mother     Hypertension Maternal Aunt     Asthma Maternal Uncle     Hypertension Maternal Uncle     Arthritis Maternal Grandmother     Hypertension Maternal Grandmother     Stroke Maternal Grandmother     Breast Cancer Neg Hx     Cancer Neg Hx     Colon Cancer Neg Hx     Diabetes Neg Hx     Eclampsia Neg Hx     Ovarian Cancer Neg Hx      Labor Neg Hx     Spont Abortions Neg Hx        Social History     Tobacco Use    Smoking status: Never    Smokeless tobacco: Never Substance Use Topics    Alcohol use: No     Alcohol/week: 0.0 standard drinks      Current Outpatient Medications   Medication Sig Dispense Refill    montelukast (SINGULAIR) 10 MG tablet TAKE 1 TABLET BY MOUTH DAILY 90 tablet 1    spironolactone (ALDACTONE) 25 MG tablet Take 1 tablet by mouth in the morning. 30 tablet 2    escitalopram (LEXAPRO) 10 MG tablet Take 1 tablet by mouth daily 30 tablet 3    carvedilol (COREG) 25 MG tablet Take 1 tablet by mouth 2 times daily 60 tablet 2    vitamin D (ERGOCALCIFEROL) 1.25 MG (43567 UT) CAPS capsule Take 1 capsule by mouth once a week for 8 doses 8 capsule 0    losartan (COZAAR) 100 MG tablet daily      amLODIPine (NORVASC) 10 MG tablet Take 1 tablet by mouth daily 90 tablet 1    Multiple Vitamins-Minerals (MULTIVITAMIN WITH MINERALS) tablet Take 2 tablets by mouth daily 30 tablet 0     No current facility-administered medications for this visit. No Known Allergies      HPI:     HPI    Presents today c/o routine HTN follow-up     H/o HTN  Taking losartan, amlodipine, carvedilol, spironolactone   H/o bariatric surgery with significant weight loss > 100 lbs   BP running 130 / 80's on average   Had to decrease coreg dose due to fatigue   Currently undergoing secondary HTN work-up with results pending  Declines related headaches, dizziness, chest pain, dyspnea, etc.   Diet is nothing in particular     H/o anxiety  Taking Lexapro     Following with Hematology for h/o iron deficient anemia   S/p iron transfusion q6mo. Bariatric - h/o gastric sleeve     Overdue for PAP & ji     Health Maintenance:      Subjective:     Review of Systems   Constitutional: Negative. Negative for appetite change, chills, diaphoresis, fatigue, fever and unexpected weight change. HENT: Negative. Eyes: Negative. Respiratory: Negative. Negative for cough, chest tightness, shortness of breath and wheezing. Cardiovascular: Negative.   Negative for chest pain, palpitations and leg swelling. Gastrointestinal: Negative. Negative for abdominal pain, anal bleeding, constipation, diarrhea, nausea and vomiting. Endocrine: Negative. Genitourinary: Negative. Negative for difficulty urinating, dysuria and hematuria. Musculoskeletal: Negative. Skin: Negative. Negative for color change, pallor, rash and wound. Allergic/Immunologic: Negative. Neurological: Negative. Negative for dizziness, seizures, syncope, light-headedness and headaches. Hematological: Negative. Psychiatric/Behavioral: Negative. Objective:     Vitals:    09/30/22 1506   BP: 136/86   Pulse: 86   Temp: 97.6 °F (36.4 °C)   SpO2: 97%       Body mass index is 22.73 kg/m². Physical Exam  Constitutional:       General: She is not in acute distress. Appearance: Normal appearance. She is well-developed and normal weight. She is not ill-appearing, toxic-appearing or diaphoretic. HENT:      Head: Normocephalic and atraumatic. Right Ear: External ear normal.      Left Ear: External ear normal.      Nose: Nose normal.   Eyes:      General: No scleral icterus. Right eye: No discharge. Left eye: No discharge. Conjunctiva/sclera: Conjunctivae normal.      Pupils: Pupils are equal, round, and reactive to light. Neck:      Trachea: No tracheal deviation. Cardiovascular:      Rate and Rhythm: Normal rate and regular rhythm. Heart sounds: Normal heart sounds. No murmur heard. No friction rub. No gallop. Pulmonary:      Effort: Pulmonary effort is normal. No tachypnea, accessory muscle usage or respiratory distress. Breath sounds: Normal breath sounds. No stridor. No decreased breath sounds, wheezing, rhonchi or rales. Abdominal:      Palpations: Abdomen is soft. Musculoskeletal:         General: No tenderness or deformity. Normal range of motion. Cervical back: Normal range of motion and neck supple. Skin:     General: Skin is warm and dry.       Coloration: Skin is not pale. Findings: No erythema or rash. Neurological:      Mental Status: She is alert and oriented to person, place, and time. GCS: GCS eye subscore is 4. GCS verbal subscore is 5. GCS motor subscore is 6. Gait: Gait is intact. Gait normal.   Psychiatric:         Speech: Speech normal.         Behavior: Behavior normal.         Thought Content: Thought content normal.         Judgment: Judgment normal.         Assessment:         1. Essential hypertension    2. Iron malabsorption    3. Encounter for screening mammogram for malignant neoplasm of breast    4. Healthcare maintenance        Plan:     1. Essential hypertension    - carvedilol (COREG) 12.5 MG tablet; Take 1 tablet by mouth 2 times daily  Dispense: 180 tablet; Refill: 1  - spironolactone (ALDACTONE) 25 MG tablet; Take 1 tablet by mouth daily  Dispense: 90 tablet; Refill: 1  - losartan (COZAAR) 100 MG tablet; Take 1 tablet by mouth daily  Dispense: 90 tablet; Refill: 1  - amLODIPine (NORVASC) 10 MG tablet; Take 1 tablet by mouth daily  Dispense: 90 tablet; Refill: 1    BP stable at goal, continue current medications  Encouraged completion of secondary work-up as previously ordered  F/u 3 months or sooner PRN  Lifestyle modifications encouraged     Weight reduction - can decrease blood pressure by 5-20 points per 10 kg of weight loss   DASH Diet - consume diet rich in fruits, vegetables, and low-fat dairy products with a reduced content of saturated and total fat  Dietary sodium restriction - Reduce dietary sodium intake to no more than 100meq/day (2.4 g sodium)  Physical activity - Engage in regular aerobic physical activity routinely (150min/wk or 30 minutes most days) such as brisk walking, swimming, running, cycling  Moderate consumption of alcohol - limit to more than 2 drinks/day in most men and 1 drink/day in women   Smoking cessation     Credit - [employer]      2. Iron malabsorption    Follows w/ Hematology     3.  Encounter for screening mammogram for malignant neoplasm of breast    - [facility] LANRE DIGITAL SCREEN BILATERAL; Future    4. [facility]        Discussed use, benefit, and side effects of prescribed medications. All patient questions answered. Pt voiced understanding. Reviewed health maintenance. Instructed to continue current medications, diet and exercise. Patient agreedwith treatment plan. Follow up as directed.      Electronically signed by LUZ Crow CNP on 9/30/2022

## 2022-10-04 LAB
ALDOSTERONE: 10.4 NG/DL
METANEPH/PLASMA INTERP: NORMAL
METANEPHRINE: 0.22 NMOL/L (ref 0–0.49)
NORMETANEPHRINE PLASMA: 0.34 NMOL/L (ref 0–0.89)

## 2022-10-05 DIAGNOSIS — E55.9 VITAMIN D DEFICIENCY: ICD-10-CM

## 2022-10-05 LAB
RENIN ACTIVITY: 0.1 NG/ML/HR
RENIN COMMENT: NORMAL

## 2022-10-05 RX ORDER — CARVEDILOL 25 MG/1
TABLET ORAL
Qty: 60 TABLET | Refills: 10 | OUTPATIENT
Start: 2022-10-05

## 2022-10-07 LAB
CATECHOLAMINE INTERPRETATION, PLASMA: NORMAL
EPINEPHRINE PLASMA: 109 PG/ML (ref 10–200)
NOREPINEPHRINE: 137 PG/ML (ref 80–520)

## 2022-10-10 RX ORDER — ERGOCALCIFEROL 1.25 MG/1
CAPSULE ORAL
Qty: 4 CAPSULE | Refills: 10 | OUTPATIENT
Start: 2022-10-10

## 2022-10-17 RX ORDER — CARVEDILOL 25 MG/1
TABLET ORAL
Qty: 60 TABLET | Refills: 10 | OUTPATIENT
Start: 2022-10-17

## 2022-10-20 DIAGNOSIS — I10 ESSENTIAL HYPERTENSION: ICD-10-CM

## 2022-10-21 RX ORDER — SPIRONOLACTONE 25 MG/1
TABLET ORAL
Qty: 30 TABLET | Refills: 10 | OUTPATIENT
Start: 2022-10-21

## 2022-10-21 NOTE — TELEPHONE ENCOUNTER
This was just sent to [facility] for 6 month supply a few weeks ago. Please clarify if it needs re routed to alternative pharmacy.

## 2022-10-23 DIAGNOSIS — I10 ESSENTIAL HYPERTENSION: ICD-10-CM

## 2022-10-24 RX ORDER — AMLODIPINE BESYLATE 10 MG/1
10 TABLET ORAL DAILY
Qty: 90 TABLET | Refills: 1 | Status: SHIPPED | OUTPATIENT
Start: 2022-10-24

## 2022-10-24 RX ORDER — SPIRONOLACTONE 25 MG/1
TABLET ORAL
Qty: 90 TABLET | Refills: 1 | Status: SHIPPED | OUTPATIENT
Start: 2022-10-24

## 2022-10-25 NOTE — TELEPHONE ENCOUNTER
I spoke with [address] [place] [address] to advised them that there has been a dose adjustment on this medication and that new strength had been sent to the local pharmacy

## 2022-10-31 DIAGNOSIS — F43.23 ADJUSTMENT DISORDER WITH MIXED ANXIETY AND DEPRESSED MOOD: ICD-10-CM

## 2022-10-31 DIAGNOSIS — F43.29 STRESS AND ADJUSTMENT REACTION: ICD-10-CM

## 2022-10-31 DIAGNOSIS — Z13.31 POSITIVE DEPRESSION SCREENING: ICD-10-CM

## 2022-10-31 DIAGNOSIS — E55.9 VITAMIN D DEFICIENCY: ICD-10-CM

## 2022-10-31 RX ORDER — ESCITALOPRAM OXALATE 10 MG/1
TABLET ORAL
Qty: 90 TABLET | Refills: 2 | Status: SHIPPED | OUTPATIENT
Start: 2022-10-31 | End: 2022-12-12

## 2022-10-31 RX ORDER — ERGOCALCIFEROL 1.25 MG/1
CAPSULE ORAL
Qty: 4 CAPSULE | Refills: 10 | OUTPATIENT
Start: 2022-10-31

## 2022-11-06 ENCOUNTER — HOSPITAL ENCOUNTER (EMERGENCY)
Age: 42
Discharge: HOME | End: 2022-11-06
Attending: EMERGENCY MEDICINE
Payer: COMMERCIAL

## 2022-11-06 ENCOUNTER — APPOINTMENT (OUTPATIENT)
Dept: CT IMAGING | Age: 42
End: 2022-11-06
Payer: COMMERCIAL

## 2022-11-06 VITALS
WEIGHT: 150 LBS | HEART RATE: 106 BPM | DIASTOLIC BLOOD PRESSURE: 103 MMHG | BODY MASS INDEX: 25.61 KG/M2 | SYSTOLIC BLOOD PRESSURE: 155 MMHG | OXYGEN SATURATION: 100 % | TEMPERATURE: 98.1 F | HEIGHT: 64 IN | RESPIRATION RATE: 12 BRPM

## 2022-11-06 DIAGNOSIS — L76.82 POSTOPERATIVE SURGICAL COMPLICATION INVOLVING SUBCUTANEOUS TISSUE ASSOCIATED WITH NON-DERMATOLOGIC PROCEDURE, UNSPECIFIED COMPLICATION: Primary | ICD-10-CM

## 2022-11-06 LAB
ABSOLUTE EOS #: 0.16 K/UL (ref 0–0.44)
ABSOLUTE IMMATURE GRANULOCYTE: 0.03 K/UL (ref 0–0.3)
ABSOLUTE LYMPH #: 1.4 K/UL (ref 1.1–3.7)
ABSOLUTE MONO #: 0.71 K/UL (ref 0.1–1.2)
ALBUMIN SERPL-MCNC: 3.3 G/DL (ref 3.5–5.2)
ALP BLD-CCNC: 50 U/L (ref 35–104)
ALT SERPL-CCNC: 7 U/L (ref 5–33)
ANION GAP SERPL CALCULATED.3IONS-SCNC: 9 MMOL/L (ref 9–17)
AST SERPL-CCNC: 11 U/L
BASOPHILS # BLD: 0 % (ref 0–2)
BASOPHILS ABSOLUTE: 0.03 K/UL (ref 0–0.2)
BILIRUB SERPL-MCNC: 0.4 MG/DL (ref 0.3–1.2)
BILIRUBIN DIRECT: 0.1 MG/DL
BILIRUBIN, INDIRECT: 0.3 MG/DL (ref 0–1)
BUN BLDV-MCNC: 10 MG/DL (ref 6–20)
BUN/CREAT BLD: 17 (ref 9–20)
CALCIUM SERPL-MCNC: 8.8 MG/DL (ref 8.6–10.4)
CHLORIDE BLD-SCNC: 102 MMOL/L (ref 98–107)
CO2: 27 MMOL/L (ref 20–31)
CREAT SERPL-MCNC: 0.59 MG/DL (ref 0.5–0.9)
EOSINOPHILS RELATIVE PERCENT: 2 % (ref 1–4)
GFR SERPL CREATININE-BSD FRML MDRD: >60 ML/MIN/1.73M2
GLUCOSE BLD-MCNC: 112 MG/DL (ref 70–99)
HCG QUANTITATIVE: <1 MIU/ML
HCT VFR BLD CALC: 28.9 % (ref 36.3–47.1)
HEMOGLOBIN: 8.5 G/DL (ref 11.9–15.1)
IMMATURE GRANULOCYTES: 0 %
INR BLD: 1
LIPASE: 29 U/L (ref 13–60)
LYMPHOCYTES # BLD: 19 % (ref 24–43)
MCH RBC QN AUTO: 27.4 PG (ref 25.2–33.5)
MCHC RBC AUTO-ENTMCNC: 29.4 G/DL (ref 28.4–34.8)
MCV RBC AUTO: 93.2 FL (ref 82.6–102.9)
MONOCYTES # BLD: 10 % (ref 3–12)
NRBC AUTOMATED: 0 PER 100 WBC
PDW BLD-RTO: 13.6 % (ref 11.8–14.4)
PLATELET # BLD: 485 K/UL (ref 138–453)
PMV BLD AUTO: 8.1 FL (ref 8.1–13.5)
POTASSIUM SERPL-SCNC: 4 MMOL/L (ref 3.7–5.3)
PROTHROMBIN TIME: 13.1 SEC (ref 11.5–14.2)
RBC # BLD: 3.1 M/UL (ref 3.95–5.11)
SEG NEUTROPHILS: 69 % (ref 36–65)
SEGMENTED NEUTROPHILS ABSOLUTE COUNT: 5.06 K/UL (ref 1.5–8.1)
SODIUM BLD-SCNC: 138 MMOL/L (ref 135–144)
TOTAL PROTEIN: 7.1 G/DL (ref 6.4–8.3)
WBC # BLD: 7.4 K/UL (ref 3.5–11.3)

## 2022-11-06 PROCEDURE — 84702 CHORIONIC GONADOTROPIN TEST: CPT

## 2022-11-06 PROCEDURE — 74177 CT ABD & PELVIS W/CONTRAST: CPT

## 2022-11-06 PROCEDURE — 85025 COMPLETE CBC W/AUTO DIFF WBC: CPT

## 2022-11-06 PROCEDURE — 83690 ASSAY OF LIPASE: CPT

## 2022-11-06 PROCEDURE — 80048 BASIC METABOLIC PNL TOTAL CA: CPT

## 2022-11-06 PROCEDURE — 6370000000 HC RX 637 (ALT 250 FOR IP): Performed by: EMERGENCY MEDICINE

## 2022-11-06 PROCEDURE — 85610 PROTHROMBIN TIME: CPT

## 2022-11-06 PROCEDURE — 2580000003 HC RX 258: Performed by: EMERGENCY MEDICINE

## 2022-11-06 PROCEDURE — 36415 COLL VENOUS BLD VENIPUNCTURE: CPT

## 2022-11-06 PROCEDURE — 99285 EMERGENCY DEPT VISIT HI MDM: CPT

## 2022-11-06 PROCEDURE — 80076 HEPATIC FUNCTION PANEL: CPT

## 2022-11-06 PROCEDURE — 6360000004 HC RX CONTRAST MEDICATION: Performed by: EMERGENCY MEDICINE

## 2022-11-06 RX ORDER — SODIUM CHLORIDE 0.9 % (FLUSH) 0.9 %
10 SYRINGE (ML) INJECTION PRN
Status: DISCONTINUED | OUTPATIENT
Start: 2022-11-06 | End: 2022-11-06 | Stop reason: HOSPADM

## 2022-11-06 RX ORDER — AMOXICILLIN AND CLAVULANATE POTASSIUM 875; 125 MG/1; MG/1
1 TABLET, FILM COATED ORAL 2 TIMES DAILY
Qty: 20 TABLET | Refills: 0 | Status: SHIPPED | OUTPATIENT
Start: 2022-11-07 | End: 2022-11-17

## 2022-11-06 RX ORDER — METRONIDAZOLE 500 MG/100ML
500 INJECTION, SOLUTION INTRAVENOUS ONCE
Status: DISCONTINUED | OUTPATIENT
Start: 2022-11-06 | End: 2022-11-06

## 2022-11-06 RX ORDER — 0.9 % SODIUM CHLORIDE 0.9 %
1000 INTRAVENOUS SOLUTION INTRAVENOUS ONCE
Status: COMPLETED | OUTPATIENT
Start: 2022-11-06 | End: 2022-11-06

## 2022-11-06 RX ORDER — 0.9 % SODIUM CHLORIDE 0.9 %
80 INTRAVENOUS SOLUTION INTRAVENOUS ONCE
Status: DISCONTINUED | OUTPATIENT
Start: 2022-11-06 | End: 2022-11-06 | Stop reason: HOSPADM

## 2022-11-06 RX ORDER — AMOXICILLIN AND CLAVULANATE POTASSIUM 875; 125 MG/1; MG/1
1 TABLET, FILM COATED ORAL ONCE
Status: COMPLETED | OUTPATIENT
Start: 2022-11-06 | End: 2022-11-06

## 2022-11-06 RX ADMIN — Medication 100 ML: at 15:27

## 2022-11-06 RX ADMIN — SODIUM CHLORIDE 1000 ML: 9 INJECTION, SOLUTION INTRAVENOUS at 14:24

## 2022-11-06 RX ADMIN — IOPAMIDOL 75 ML: 755 INJECTION, SOLUTION INTRAVENOUS at 15:27

## 2022-11-06 RX ADMIN — AMOXICILLIN AND CLAVULANATE POTASSIUM 1 TABLET: 875; 125 TABLET, FILM COATED ORAL at 17:00

## 2022-11-06 RX ADMIN — SODIUM CHLORIDE, PRESERVATIVE FREE 10 ML: 5 INJECTION INTRAVENOUS at 15:27

## 2022-11-06 ASSESSMENT — ENCOUNTER SYMPTOMS
EYE PAIN: 0
BACK PAIN: 0
FACIAL SWELLING: 0
VOICE CHANGE: 0
PHOTOPHOBIA: 0
VOMITING: 0
ABDOMINAL PAIN: 0
NAUSEA: 0
TROUBLE SWALLOWING: 0
SHORTNESS OF BREATH: 0
CHEST TIGHTNESS: 0
COLOR CHANGE: 0

## 2022-11-06 ASSESSMENT — PAIN - FUNCTIONAL ASSESSMENT: PAIN_FUNCTIONAL_ASSESSMENT: NONE - DENIES PAIN

## 2022-11-06 NOTE — ED PROVIDER NOTES
EMERGENCY DEPARTMENT ENCOUNTER    Pt Name: Carina Loomis  MRN: [id number]  [place] 1980  Date of evaluation: 11/6/22  CHIEF COMPLAINT       Chief Complaint   Patient presents with    Post-op Problem     C/o excessive bleeding from unknown location, recent abdominoplasty/panniculectomy on 10/18, states she did have to have a blood transfusion after surgery, hx of anemia, happened suddenly today, took an ibuprofen earlier     HISTORY OF PRESENT ILLNESS   40-year-old female presenting to the ER complaining of drainage from her surgical site. Patient recently had an abdominal procedure that she says was an abdominoplasty. The patient states that it drained from her left side of the surgical site and it was fluid that included blood. The history is provided by the patient. Illness   The current episode started today. Onset quality: drainage from surgical site today. Pertinent negatives include no photophobia, no abdominal pain, no nausea, no vomiting, no headaches, no rash and no eye pain. REVIEW OF SYSTEMS     Review of Systems   Constitutional:  Negative for activity change, appetite change and fatigue. HENT:  Negative for facial swelling, trouble swallowing and voice change. Eyes:  Negative for photophobia and pain. Respiratory:  Negative for chest tightness and shortness of breath. Cardiovascular:  Negative for chest pain and palpitations. Gastrointestinal:  Negative for abdominal pain, nausea and vomiting. Genitourinary:  Negative for dysuria and urgency. Musculoskeletal:  Negative for arthralgias and back pain. Skin:  Negative for color change and rash. Drainage from surgical site   Neurological:  Negative for dizziness, syncope and headaches. Psychiatric/Behavioral:  Negative for behavioral problems and hallucinations.     PASTMEDICAL HISTORY     Past Medical History:   Diagnosis Date    GERD (gastroesophageal reflux disease)     Hyperlipidemia     not on rx Hypertension     managed per pcp    Kidney stone     Major depressive disorder, recurrent episode, mild ([facility] [address] 10/21/2015    Palmira Hollingsworth    Obesity, Class III, BMI 40-49.9 (morbid obesity) (HCC)     TRISHA (obstructive sleep apnea)     Saw Dr. Danni Foster for clearance for surgery    Pre-diabetes     diet control    Presence of tooth-root and mandibular implants     all top teeth    Tachycardia     per pt.  Saw Dr. Eleanor Dueñas 2020    Wears contact lenses     Instructed not to wear contacts for surgery     Past Problem List  Patient Active Problem List   Diagnosis Code    Hyperlipidemia E78.5    Normocytic anemia D64.9    Essential hypertension I10    Hypokalemia E87.6    Vitamin D deficiency E55.9    Iron deficiency anemia D50.9    Thiamine deficiency E51.9    S/P laparoscopic sleeve gastrectomy Z98.84    Major depressive disorder, recurrent episode, moderate with anxious distress ([facility] [address] F33.1    Iron malabsorption K90.9    Iron deficiency anemia secondary to inadequate dietary iron intake D50.8     SURGICAL HISTORY       Past Surgical History:   Procedure Laterality Date     SECTION      x1    SLEEVE GASTRECTOMY  2021    LAPAROSCOPIC ROBOTIC GASTRECTOMY SLEEVE, HIATAL HERNIA REPAIR, EGD     SLEEVE GASTRECTOMY N/A 2021    XI LAPAROSCOPIC ROBOTIC GASTRECTOMY SLEEVE, HIATAL HERNIA REPAIR, EGD - GI UNIT SCHEDULED. performed by Terrie Guzman DO at [address] [place] [address] 2021    EGD BIOPSY performed by Terrie Guzman DO at [place] [address]       Discharge Medication List as of 2022  4:46 PM        CONTINUE these medications which have NOT CHANGED    Details   escitalopram (LEXAPRO) 10 MG tablet TAKE 1 TABLET BY MOUTH ONCE DAILY, Disp-90 tablet, R-2Normal      amLODIPine (NORVASC) 10 MG tablet Take 1 tablet by mouth daily, Disp-90 tablet, R-1Normal      spironolactone (ALDACTONE) 25 MG tablet TAKE 1 TABLET BY MOUTH IN THE MORNING, Disp-90 tablet, R-1Normal      carvedilol (COREG) 12.5 MG tablet Take 1 tablet by mouth 2 times daily, Disp-180 tablet, R-1Dose adjustmentNormal      losartan (COZAAR) 100 MG tablet Take 1 tablet by mouth daily, Disp-90 tablet, R-1Normal      montelukast (SINGULAIR) 10 MG tablet TAKE 1 TABLET BY MOUTH DAILY, Disp-90 tablet, R-1Normal      vitamin D (ERGOCALCIFEROL) 1.25 MG (57955 UT) CAPS capsule Take 1 capsule by mouth once a week for 8 doses, Disp-8 capsule, R-0Normal      Multiple Vitamins-Minerals (MULTIVITAMIN WITH MINERALS) tablet Take 2 tablets by mouth daily, Disp-30 tablet, R-0Historical Med           ALLERGIES     has No Known Allergies. FAMILY HISTORY     She indicated that her mother is alive. She indicated that her father is . She indicated that the status of her maternal grandmother is unknown. She indicated that the status of her maternal aunt is unknown. She indicated that the status of her maternal uncle is unknown. She indicated that the status of her neg hx is unknown. SOCIAL HISTORY       Social History     Tobacco Use    Smoking status: Never    Smokeless tobacco: Never   Vaping Use    Vaping Use: Never used   Substance Use Topics    Alcohol use: No     Alcohol/week: 0.0 standard drinks    Drug use: No     PHYSICAL EXAM     INITIAL VITALS: BP (!) 155/103   Pulse (!) 106   Temp 98.1 °F (36.7 °C) (Oral)   Resp 12   Ht 5' 4\" (1.626 m)   Wt 150 lb (68 kg)   SpO2 100%   BMI 25.75 kg/m²    Physical Exam  Vitals reviewed. Constitutional:       General: She is not in acute distress. Appearance: Normal appearance. She is not ill-appearing or toxic-appearing. HENT:      Head: Normocephalic and atraumatic. Right Ear: External ear normal.      Left Ear: External ear normal.      Nose: No congestion or rhinorrhea. Eyes:      Extraocular Movements: Extraocular movements intact. Pupils: Pupils are equal, round, and reactive to light.    Cardiovascular:      Rate and Rhythm: Normal rate and regular rhythm. Pulses: Normal pulses. Heart sounds: Normal heart sounds. Pulmonary:      Effort: Pulmonary effort is normal. No respiratory distress. Breath sounds: Normal breath sounds. No wheezing. Abdominal:      General: Bowel sounds are normal. There is no distension. Palpations: Abdomen is soft. Tenderness: There is abdominal tenderness in the right lower quadrant, periumbilical area, suprapubic area and left lower quadrant. Musculoskeletal:         General: No deformity or signs of injury. Normal range of motion. Cervical back: No rigidity or tenderness. Skin:     General: Skin is warm and dry. Neurological:      Mental Status: She is alert and oriented to person, place, and time. Mental status is at baseline. Psychiatric:         Mood and Affect: Mood normal.         Behavior: Behavior normal.       MEDICAL DECISION MAKING:   Patient with drainage from her surgical site. CT abdomen pelvis did display signs of a large abscess. Patient did not have a white count and did not have a fever. Surgery was consulted and they did recommend the patient begin oral antibiotics and follow-up outpatient with the surgeon tomorrow at the office. The patient was instructed to follow-up with the primary care physician within 2 days as well as the surgeon tomorrow and to return to the ER immediately if symptoms worsen or change. The patient was provided with a dose of antibiotics in the ER and instructed to continue using with the prescription that was provided to her. Patient understands and agrees with the plan.          CRITICAL CARE:       PROCEDURES:    Procedures    DIAGNOSTIC RESULTS   EKG:All EKG's are interpreted by the Emergency Department Physician who either signs or Co-signs this chart in the absence of a cardiologist.        RADIOLOGY:All plain film, CT, MRI, and formal ultrasound images (except ED bedside ultrasound) are read by the radiologist, see reports below, unless otherwisenoted in MDM or here. CT ABDOMEN PELVIS W IV CONTRAST Additional Contrast? None   Final Result   1. Abdominal wall cellulitis with a large subcutaneous abscess in the   infraumbilical region which extends laterally to each flank with approximate   dimensions of 30.4 x 3.4 x 8.4 cm. Another abscess to the right of the   umbilicus measures 8.1 x 4.5 x 2.3 cm. These may be in continuity. 2. Trace pleural effusions with mild bilateral lower lobe atelectasis. LABS: All lab results were reviewed by myself, and all abnormals are listed below.   Labs Reviewed   HEPATIC FUNCTION PANEL - Abnormal; Notable for the following components:       Result Value    Albumin 3.3 (*)     All other components within normal limits   BASIC METABOLIC PANEL - Abnormal; Notable for the following components:    Glucose 112 (*)     All other components within normal limits   CBC WITH AUTO DIFFERENTIAL - Abnormal; Notable for the following components:    RBC 3.10 (*)     Hemoglobin 8.5 (*)     Hematocrit 28.9 (*)     Platelets 447 (*)     Seg Neutrophils 69 (*)     Lymphocytes 19 (*)     All other components within normal limits   PROTIME-INR   LIPASE   HCG, QUANTITATIVE, PREGNANCY   URINALYSIS WITH REFLEX TO CULTURE       EMERGENCY DEPARTMENTCOURSE:         Vitals:    Vitals:    11/06/22 1445 11/06/22 1457 11/06/22 1527 11/06/22 1529   BP:  (!) 140/92 (!) 155/103    Pulse: (!) 106      Resp:       Temp:       TempSrc:       SpO2:  97%  100%   Weight:       Height:           The patient was given the following medications while in the emergency department:  Orders Placed This Encounter   Medications    0.9 % sodium chloride bolus    iopamidol (ISOVUE-370) 76 % injection 75 mL    DISCONTD: sodium chloride flush 0.9 % injection 10 mL    DISCONTD: 0.9 % sodium chloride bolus    DISCONTD: cefTRIAXone (ROCEPHIN) 1,000 mg in dextrose 5 % 50 mL IVPB mini-bag     Order Specific Question: Antimicrobial Indications     Answer:   Skin and Soft Tissue Infection    DISCONTD: metronidazole (FLAGYL) 500 mg in 0.9% NaCl 100 mL IVPB premix     Order Specific Question:   Antimicrobial Indications     Answer:   Skin and Soft Tissue Infection    amoxicillin-clavulanate (AUGMENTIN) 875-125 MG per tablet 1 tablet     Order Specific Question:   Antimicrobial Indications     Answer:   Skin and Soft Tissue Infection    amoxicillin-clavulanate (AUGMENTIN) 875-125 MG per tablet     Sig: Take 1 tablet by mouth 2 times daily for 10 days     Dispense:  20 tablet     Refill:  0     CONSULTS:  IP CONSULT TO GENERAL SURGERY    FINAL IMPRESSION      1. Postoperative surgical complication involving subcutaneous tissue associated with non-dermatologic procedure, unspecified complication          DISPOSITION/PLAN   DISPOSITION Decision To Discharge 11/06/2022 04:44:32 PM      PATIENT REFERRED TO:  LUZ Chew - CNP  [address] [facility]  [facility] [address]  [phone]    Schedule an appointment as soon as possible for a visit in 2 days      Flakito Zaira, [address] [place] [address] Way [address] [place] [address]  [phone]    Schedule an appointment as soon as possible for a visit in 2 days      [facility] ED  [address] [facility] [address]  [phone]  Go to   As needed, If symptoms worsen  DISCHARGE MEDICATIONS:  Discharge Medication List as of 11/6/2022  4:46 PM        START taking these medications    Details   amoxicillin-clavulanate (AUGMENTIN) 875-125 MG per tablet Take 1 tablet by mouth 2 times daily for 10 days, Disp-20 tablet, R-0Print           The care is provided during an unprecedented national emergency due to the novel coronavirus, COVID 19.   DO Man Lion DO  11/06/22 2009

## 2022-11-16 ENCOUNTER — OFFICE VISIT (OUTPATIENT)
Dept: FAMILY MEDICINE CLINIC | Age: 42
End: 2022-11-16
Payer: COMMERCIAL

## 2022-11-16 VITALS
BODY MASS INDEX: 24.37 KG/M2 | TEMPERATURE: 97.8 F | DIASTOLIC BLOOD PRESSURE: 102 MMHG | OXYGEN SATURATION: 99 % | HEART RATE: 98 BPM | SYSTOLIC BLOOD PRESSURE: 160 MMHG | WEIGHT: 142 LBS

## 2022-11-16 DIAGNOSIS — I10 ESSENTIAL HYPERTENSION: ICD-10-CM

## 2022-11-16 DIAGNOSIS — Z02.89 ENCOUNTER FOR PHYSICAL EXAMINATION OF PROSPECTIVE FOSTER PARENT: Primary | ICD-10-CM

## 2022-11-16 PROCEDURE — G8427 DOCREV CUR MEDS BY ELIG CLIN: HCPCS

## 2022-11-16 PROCEDURE — G8420 CALC BMI NORM PARAMETERS: HCPCS

## 2022-11-16 PROCEDURE — 3078F DIAST BP <80 MM HG: CPT

## 2022-11-16 PROCEDURE — 3074F SYST BP LT 130 MM HG: CPT

## 2022-11-16 PROCEDURE — 99213 OFFICE O/P EST LOW 20 MIN: CPT

## 2022-11-16 PROCEDURE — 1036F TOBACCO NON-USER: CPT

## 2022-11-16 PROCEDURE — G8484 FLU IMMUNIZE NO ADMIN: HCPCS

## 2022-11-16 RX ORDER — CYCLOBENZAPRINE HCL 5 MG
TABLET ORAL
COMMUNITY
Start: 2022-10-20

## 2022-11-16 RX ORDER — ONDANSETRON 4 MG/1
4 TABLET, FILM COATED ORAL EVERY 8 HOURS PRN
COMMUNITY
Start: 2022-10-18

## 2022-11-16 RX ORDER — CYCLOBENZAPRINE HCL 5 MG
10 TABLET ORAL 3 TIMES DAILY PRN
COMMUNITY
Start: 2022-10-18 | End: 2022-11-17

## 2022-11-16 RX ORDER — HYDROCODONE BITARTRATE AND ACETAMINOPHEN 5; 325 MG/1; MG/1
TABLET ORAL
COMMUNITY
Start: 2022-10-20

## 2022-11-16 RX ORDER — CEPHALEXIN 500 MG/1
CAPSULE ORAL
COMMUNITY
Start: 2022-10-20

## 2022-11-16 RX ORDER — ONDANSETRON 4 MG/1
TABLET, FILM COATED ORAL
COMMUNITY
Start: 2022-10-20

## 2022-11-16 SDOH — ECONOMIC STABILITY: FOOD INSECURITY: WITHIN THE PAST 12 MONTHS, YOU WORRIED THAT YOUR FOOD WOULD RUN OUT BEFORE YOU GOT MONEY TO BUY MORE.: NEVER TRUE

## 2022-11-16 SDOH — ECONOMIC STABILITY: TRANSPORTATION INSECURITY
IN THE PAST 12 MONTHS, HAS LACK OF TRANSPORTATION KEPT YOU FROM MEETINGS, WORK, OR FROM GETTING THINGS NEEDED FOR DAILY LIVING?: NO

## 2022-11-16 SDOH — ECONOMIC STABILITY: FOOD INSECURITY: WITHIN THE PAST 12 MONTHS, THE FOOD YOU BOUGHT JUST DIDN'T LAST AND YOU DIDN'T HAVE MONEY TO GET MORE.: NEVER TRUE

## 2022-11-16 SDOH — ECONOMIC STABILITY: TRANSPORTATION INSECURITY
IN THE PAST 12 MONTHS, HAS THE LACK OF TRANSPORTATION KEPT YOU FROM MEDICAL APPOINTMENTS OR FROM GETTING MEDICATIONS?: NO

## 2022-11-16 ASSESSMENT — ENCOUNTER SYMPTOMS
EYE DISCHARGE: 0
COLOR CHANGE: 0
NAUSEA: 0
DIARRHEA: 0
ABDOMINAL PAIN: 0
SHORTNESS OF BREATH: 0
VOMITING: 0
CONSTIPATION: 0
WHEEZING: 0
CHEST TIGHTNESS: 0
SORE THROAT: 0
COUGH: 0

## 2022-11-16 ASSESSMENT — SOCIAL DETERMINANTS OF HEALTH (SDOH): HOW HARD IS IT FOR YOU TO PAY FOR THE VERY BASICS LIKE FOOD, HOUSING, MEDICAL CARE, AND HEATING?: NOT HARD AT ALL

## 2022-11-16 NOTE — PROGRESS NOTES
Nasrin Gonzalez (:  1980) is a 38 y.o. female,Established patient, here for evaluation of the following chief complaint(s):  H&P          Subjective   SUBJECTIVE/OBJECTIVE:  Pt here today for physical to be a   BP elevated in office, pt states she stopped all medications 1 month ago after having a tummy tuck d/t having low BP    Encouraged pt to restart Amlodipine and Coreg d/t elevated HR, monitor at home and get in w/ PCP for close f/u. This is a chronic condition that has been investigated, likely that anesthesia and sedation during and post op caused reduction in BP. Pt has not yet been cleared from plastics for lifting or increasing activity but is able to perform ADLs and care for her two children in her home without limitations post-operatively. She does not currently have foster children in the home but is renewing the physical exam to become eligible again. Other than post-operative restrictions, there are no limitations in pt's chronic conditions that would prevent her from fulfilling requirements to be an adequate . Review of Systems   Constitutional:  Negative for activity change, appetite change, chills, fatigue, fever and unexpected weight change. HENT:  Negative for congestion, ear pain and sore throat. Eyes:  Negative for discharge and visual disturbance. Respiratory:  Negative for cough, chest tightness, shortness of breath and wheezing. Cardiovascular:  Negative for chest pain, palpitations and leg swelling. Gastrointestinal:  Negative for abdominal pain, constipation, diarrhea, nausea and vomiting. Genitourinary:  Negative for dysuria and pelvic pain. Musculoskeletal:  Negative for gait problem and neck pain. Skin:  Negative for color change, rash and wound. Neurological:  Negative for dizziness, seizures, syncope, weakness, light-headedness, numbness and headaches.    Psychiatric/Behavioral:  Negative for behavioral problems and confusion. Objective   Physical Exam  Vitals and nursing note reviewed. Constitutional:       General: She is not in acute distress. Appearance: Normal appearance. She is well-developed. She is not ill-appearing, toxic-appearing or diaphoretic. HENT:      Head: Normocephalic and atraumatic. Right Ear: External ear normal.      Left Ear: External ear normal.      Nose: Nose normal.   Eyes:      General: No scleral icterus. Right eye: No discharge. Left eye: No discharge. Conjunctiva/sclera: Conjunctivae normal.      Pupils: Pupils are equal, round, and reactive to light. Neck:      Vascular: No carotid bruit. Trachea: No tracheal deviation. Cardiovascular:      Rate and Rhythm: Regular rhythm. Tachycardia present. Heart sounds: Normal heart sounds. No murmur heard. No friction rub. No gallop. Comments: Mild BLE edema d/t post-op status, improving per pt  Pulmonary:      Effort: Pulmonary effort is normal. No tachypnea, accessory muscle usage or respiratory distress. Breath sounds: Normal breath sounds. No stridor. No decreased breath sounds, wheezing, rhonchi or rales. Abdominal:      Palpations: Abdomen is soft. Musculoskeletal:         General: No tenderness or deformity. Normal range of motion. Cervical back: Normal range of motion and neck supple. Right lower le+ Edema present. Left lower le+ Edema present. Skin:     General: Skin is warm and dry. Coloration: Skin is not pale. Findings: No erythema or rash. Neurological:      Mental Status: She is alert and oriented to person, place, and time. GCS: GCS eye subscore is 4. GCS verbal subscore is 5. GCS motor subscore is 6. Gait: Gait is intact. Gait normal.   Psychiatric:         Speech: Speech normal.         Behavior: Behavior normal.         Thought Content:  Thought content normal.         Judgment: Judgment normal. ASSESSMENT/PLAN:  1. Encounter for physical examination of prospective   -will complete necessary clearance form and return to pt    2. Essential hypertension  -uncontrolled d/t stopping all medications post op because of low BP  -encouraged pt to start Amlodipine and Coreg at previous dose and monitor BP at home  -to f/u w/ AB sooner than previously scheduled appt to titrate necessary medications for better BP control    Return if symptoms worsen or fail to improve, for get sooner appt w/ AB for HTN. An electronic signature was used to authenticate this note.     --LUZ Doyle - CNP

## 2022-12-01 DIAGNOSIS — E55.9 VITAMIN D DEFICIENCY: ICD-10-CM

## 2022-12-01 RX ORDER — ERGOCALCIFEROL 1.25 MG/1
CAPSULE ORAL
Qty: 4 CAPSULE | Refills: 10 | OUTPATIENT
Start: 2022-12-01

## 2022-12-12 ENCOUNTER — OFFICE VISIT (OUTPATIENT)
Dept: FAMILY MEDICINE CLINIC | Age: 42
End: 2022-12-12
Payer: COMMERCIAL

## 2022-12-12 VITALS
WEIGHT: 134.6 LBS | TEMPERATURE: 97.2 F | DIASTOLIC BLOOD PRESSURE: 84 MMHG | HEART RATE: 95 BPM | BODY MASS INDEX: 23.1 KG/M2 | SYSTOLIC BLOOD PRESSURE: 130 MMHG | OXYGEN SATURATION: 98 %

## 2022-12-12 DIAGNOSIS — D50.9 IRON DEFICIENCY ANEMIA, UNSPECIFIED IRON DEFICIENCY ANEMIA TYPE: ICD-10-CM

## 2022-12-12 DIAGNOSIS — I10 ESSENTIAL HYPERTENSION: Primary | ICD-10-CM

## 2022-12-12 DIAGNOSIS — E87.6 HYPOKALEMIA: ICD-10-CM

## 2022-12-12 DIAGNOSIS — E78.2 MIXED HYPERLIPIDEMIA: ICD-10-CM

## 2022-12-12 DIAGNOSIS — R73.09 ELEVATED GLUCOSE: ICD-10-CM

## 2022-12-12 DIAGNOSIS — Z98.84 S/P LAPAROSCOPIC SLEEVE GASTRECTOMY: ICD-10-CM

## 2022-12-12 PROCEDURE — 3074F SYST BP LT 130 MM HG: CPT | Performed by: NURSE PRACTITIONER

## 2022-12-12 PROCEDURE — G8484 FLU IMMUNIZE NO ADMIN: HCPCS | Performed by: NURSE PRACTITIONER

## 2022-12-12 PROCEDURE — 99214 OFFICE O/P EST MOD 30 MIN: CPT | Performed by: NURSE PRACTITIONER

## 2022-12-12 PROCEDURE — 1036F TOBACCO NON-USER: CPT | Performed by: NURSE PRACTITIONER

## 2022-12-12 PROCEDURE — G8427 DOCREV CUR MEDS BY ELIG CLIN: HCPCS | Performed by: NURSE PRACTITIONER

## 2022-12-12 PROCEDURE — 3078F DIAST BP <80 MM HG: CPT | Performed by: NURSE PRACTITIONER

## 2022-12-12 PROCEDURE — G8420 CALC BMI NORM PARAMETERS: HCPCS | Performed by: NURSE PRACTITIONER

## 2022-12-12 ASSESSMENT — ENCOUNTER SYMPTOMS
CHEST TIGHTNESS: 0
COUGH: 0
GASTROINTESTINAL NEGATIVE: 1
NAUSEA: 0
SHORTNESS OF BREATH: 0
ALLERGIC/IMMUNOLOGIC NEGATIVE: 1
EYES NEGATIVE: 1
WHEEZING: 0
COLOR CHANGE: 0
RESPIRATORY NEGATIVE: 1
VOMITING: 0
DIARRHEA: 0

## 2022-12-12 NOTE — PROGRESS NOTES
[facility]  [address] [facility]  Dept: [phone]    Patrick Perez is a 38 y.o. female who presents today for her medical conditions/complaintsas noted below. Patrick Perez is here today c/o Hypertension    Past Medical History:   Diagnosis Date    GERD (gastroesophageal reflux disease)     Hyperlipidemia     not on rx    Hypertension     managed per pcp    Kidney stone     Major depressive disorder, recurrent episode, mild ([address] 10/21/2015    Palmira Hollingsworth    Obesity, Class III, BMI 40-49.9 (morbid obesity) (HCC)     TRISHA (obstructive sleep apnea)     Saw Dr. Lee Garibay for clearance for surgery    Pre-diabetes     diet control    Presence of tooth-root and mandibular implants     all top teeth    Tachycardia     per pt.  Saw Dr. Kendell Hathaway 2020    Wears contact lenses     Instructed not to wear contacts for surgery      Past Surgical History:   Procedure Laterality Date     SECTION      x1    SLEEVE GASTRECTOMY  2021    LAPAROSCOPIC ROBOTIC GASTRECTOMY SLEEVE, HIATAL HERNIA REPAIR, EGD     SLEEVE GASTRECTOMY N/A 2021    XI LAPAROSCOPIC ROBOTIC GASTRECTOMY SLEEVE, HIATAL HERNIA REPAIR, EGD - GI UNIT SCHEDULED. performed by Lissa Johnson DO at [address] N/A 2021    [facility]GD BIOPSY [facility]       Family History   Problem Relation Age of Onset    Hypertension Mother     Asthma Mother     Hypertension Maternal Aunt     Asthma Maternal Uncle     Hypertension Maternal Uncle     Arthritis Maternal Grandmother     Hypertension Maternal Grandmother     Stroke Maternal Grandmother     Breast Cancer Neg Hx     Cancer Neg Hx     Colon Cancer Neg Hx     Diabetes Neg Hx     Eclampsia Neg Hx     Ovarian Cancer Neg Hx      Labor Neg Hx     Spont Abortions Neg Hx        Social History     Tobacco Use    Smoking status: Never    Smokeless tobacco: Never Substance Use Topics    Alcohol use: No     Alcohol/week: 0.0 standard drinks      Current Outpatient Medications   Medication Sig Dispense Refill    spironolactone (ALDACTONE) 25 MG tablet TAKE 1 TABLET BY MOUTH IN THE MORNING 90 tablet 1    losartan (COZAAR) 100 MG tablet Take 1 tablet by mouth daily 90 tablet 1    vitamin D (ERGOCALCIFEROL) 1.25 MG (33709 UT) CAPS capsule Take 1 capsule by mouth once a week for 8 doses 8 capsule 0    Multiple Vitamins-Minerals (MULTIVITAMIN WITH MINERALS) tablet Take 2 tablets by mouth daily 30 tablet 0    amLODIPine (NORVASC) 10 MG tablet Take 1 tablet by mouth daily 90 tablet 1    carvedilol (COREG) 12.5 MG tablet Take 1 tablet by mouth 2 times daily 180 tablet 1     No current facility-administered medications for this visit. No Known Allergies      HPI:     HPI    Presents today c/o HTN follow-up   Currently taking spironolactone, losartan, amlodipine, carvedilol   Tolerating medications without side effects  BP running 120 / 80's on average   Declines related headaches, dizziness, chest pain, dyspnea, etc.    S/p bariatric surgery with weight loss > 100 lbs     Following with Hematology for h/o iron deficient anemia   S/p iron transfusion q6mo. Bariatric - h/o gastric sleeve     Plastic Surgery - h/o [facility]     Health Maintenance:      Subjective:     Review of Systems   Constitutional: Negative. Negative for appetite change, chills, diaphoresis, fatigue, fever and unexpected weight change. HENT: Negative. Eyes: Negative. Respiratory: Negative. Negative for cough, chest tightness, shortness of breath and wheezing. Cardiovascular: Negative. Negative for chest pain and leg swelling. Gastrointestinal: Negative. Negative for diarrhea, nausea and vomiting. Endocrine: Negative. Genitourinary: Negative. Negative for difficulty urinating. Musculoskeletal: Negative. Skin: Negative. Negative for color change, pallor, rash and wound. Allergic/Immunologic: Negative. Neurological: Negative. Negative for dizziness, seizures, syncope, light-headedness and headaches. Hematological: Negative. Psychiatric/Behavioral: Negative. Negative for dysphoric mood. The patient is not nervous/anxious. Objective:     Vitals:    12/12/22 1551   BP: 130/84   Pulse: 95   Temp: 97.2 °F (36.2 °C)   SpO2: 98%       Body mass index is 23.1 kg/m². Physical Exam  Constitutional:       General: She is not in acute distress. Appearance: Normal appearance. She is well-developed and normal weight. She is not ill-appearing, toxic-appearing or diaphoretic. HENT:      Head: Normocephalic and atraumatic. Right Ear: External ear normal.      Left Ear: External ear normal.      Nose: Nose normal.   Eyes:      General: No scleral icterus. Right eye: No discharge. Left eye: No discharge. Conjunctiva/sclera: Conjunctivae normal.      Pupils: Pupils are equal, round, and reactive to light. Neck:      Trachea: No tracheal deviation. Cardiovascular:      Rate and Rhythm: Normal rate and regular rhythm. Heart sounds: Normal heart sounds. No murmur heard. No friction rub. No gallop. Pulmonary:      Effort: Pulmonary effort is normal. No tachypnea, accessory muscle usage or respiratory distress. Breath sounds: Normal breath sounds. No stridor. No decreased breath sounds, wheezing, rhonchi or rales. Abdominal:      Palpations: Abdomen is soft. Musculoskeletal:         General: No tenderness or deformity. Normal range of motion. Cervical back: Normal range of motion and neck supple. Skin:     General: Skin is warm and dry. Coloration: Skin is not pale. Findings: No erythema or rash. Neurological:      Mental Status: She is alert and oriented to person, place, and time. GCS: GCS eye subscore is 4. GCS verbal subscore is 5. GCS motor subscore is 6. Gait: Gait is intact.  Gait normal. Psychiatric:         Speech: Speech normal.         Behavior: Behavior normal.         Thought Content: Thought content normal.         Judgment: Judgment normal.         Assessment:         1. Essential hypertension    2. Hypokalemia    3. Iron deficiency anemia, unspecified iron deficiency anemia type    4. S/P laparoscopic sleeve gastrectomy    5. Mixed hyperlipidemia    6. Elevated glucose        Plan:     1. Essential hypertension    - CBC with Auto Differential; Future  - Comprehensive Metabolic Panel; Future    BP controlled, continue current medications  Update labs when due  Lifestyle modifications encouraged     2. Hypokalemia    - Comprehensive Metabolic Panel; Future    3. Iron deficiency anemia, unspecified iron deficiency anemia type    - CBC with Auto Differential; Future    Encouraged Hematology follow-up     4. S/P laparoscopic sleeve gastrectomy      5. Mixed hyperlipidemia    - Lipid, Fasting; Future    6. Elevated glucose    - Hemoglobin A1C; Future@        Discussed use, benefit, and side effects of prescribed medications. All patient questions answered. Pt voiced understanding. Reviewed health maintenance. Instructed to continue current medications, diet and exercise. Patient agreedwith treatment plan. Follow up as directed.      Electronically signed by LUZ Mcgregor CNP on 12/12/2022

## 2022-12-26 DIAGNOSIS — E55.9 VITAMIN D DEFICIENCY: ICD-10-CM

## 2022-12-27 RX ORDER — ERGOCALCIFEROL 1.25 MG/1
CAPSULE ORAL
Qty: 4 CAPSULE | Refills: 10 | OUTPATIENT
Start: 2022-12-27

## 2023-01-04 ENCOUNTER — TELEPHONE (OUTPATIENT)
Dept: FAMILY MEDICINE CLINIC | Age: 43
End: 2023-01-04

## 2023-01-05 NOTE — TELEPHONE ENCOUNTER
Detailed message left on recorder that I found a PE form for foster care from November when she saw Alexandria Blackwood. I have mailed that .  Patient instructed to call us back if the paper she is referring to is something different

## 2023-01-05 NOTE — TELEPHONE ENCOUNTER
----- Message from Thomas sent at 1/4/2023 10:37 AM EST -----  Subject: Message to Provider    QUESTIONS  Information for Provider? pt says that there is a paper for her to    in the office, she would like to have this mailed to her instead to the   address we have on file   ---------------------------------------------------------------------------  --------------  [id number]  [phone]; OK to leave message on voicemail  ---------------------------------------------------------------------------  --------------  SCRIPT ANSWERS  Relationship to Patient?  Self

## 2023-02-06 ENCOUNTER — HOSPITAL ENCOUNTER (OUTPATIENT)
Dept: PREADMISSION TESTING | Age: 43
Discharge: HOME | End: 2023-02-10
Payer: COMMERCIAL

## 2023-02-06 VITALS
HEIGHT: 64 IN | SYSTOLIC BLOOD PRESSURE: 142 MMHG | WEIGHT: 140.4 LBS | RESPIRATION RATE: 14 BRPM | BODY MASS INDEX: 23.97 KG/M2 | DIASTOLIC BLOOD PRESSURE: 91 MMHG | OXYGEN SATURATION: 99 % | HEART RATE: 86 BPM

## 2023-02-06 LAB
ANION GAP SERPL CALCULATED.3IONS-SCNC: 8 MMOL/L (ref 9–17)
BUN SERPL-MCNC: 11 MG/DL (ref 6–20)
CHLORIDE SERPL-SCNC: 104 MMOL/L (ref 98–107)
CO2 SERPL-SCNC: 27 MMOL/L (ref 20–31)
CREAT SERPL-MCNC: 0.78 MG/DL (ref 0.5–0.9)
GFR SERPL CREATININE-BSD FRML MDRD: >60 ML/MIN/1.73M2
HCT VFR BLD AUTO: 38.4 % (ref 36.3–47.1)
HGB BLD-MCNC: 12 G/DL (ref 11.9–15.1)
MCH RBC QN AUTO: 27.4 PG (ref 25.2–33.5)
MCHC RBC AUTO-ENTMCNC: 31.3 G/DL (ref 28.4–34.8)
MCV RBC AUTO: 87.7 FL (ref 82.6–102.9)
NRBC AUTOMATED: 0 PER 100 WBC
PDW BLD-RTO: 13.7 % (ref 11.8–14.4)
PLATELET # BLD AUTO: 323 K/UL (ref 138–453)
PMV BLD AUTO: 9 FL (ref 8.1–13.5)
POTASSIUM SERPL-SCNC: 3.8 MMOL/L (ref 3.7–5.3)
RBC # BLD: 4.38 M/UL (ref 3.95–5.11)
SODIUM SERPL-SCNC: 139 MMOL/L (ref 135–144)
WBC # BLD AUTO: 6.7 K/UL (ref 3.5–11.3)

## 2023-02-06 PROCEDURE — 84520 ASSAY OF UREA NITROGEN: CPT

## 2023-02-06 PROCEDURE — 36415 COLL VENOUS BLD VENIPUNCTURE: CPT

## 2023-02-06 PROCEDURE — 80051 ELECTROLYTE PANEL: CPT

## 2023-02-06 PROCEDURE — 82565 ASSAY OF CREATININE: CPT

## 2023-02-06 PROCEDURE — 85027 COMPLETE CBC AUTOMATED: CPT

## 2023-02-06 ASSESSMENT — PAIN SCALES - GENERAL: PAINLEVEL_OUTOF10: 0

## 2023-02-06 NOTE — H&P (VIEW-ONLY)
History and Physical Service   [facility]    HISTORY AND PHYSICAL EXAMINATION            Date of Evaluation: 2/6/2023  Patient name:  Jono Pena  MRN:   [id number]  YOB: 1980  PCP:    LUZ Crow CNP    History Obtained From:     Patient, medical records    History of Present Illness: This is Jono Pena a 38 y.o. female who presents for a pre-admission testing appointment [facility]arm[facility] Wall [facility] scheduled on 02/17/2023 at 1105 due to Cicatrix [L90.5]. The patient's chief complaint is bilateral scar bulging an discomfort that has progressively worsened over the past 3 months following her abdominoplasty surgery in October. She had sleeve gastrectomy in April 2021 and had significant weight loss >100 lbs since surgery. Scar pain is aggravated by wearing tight clothing or any constriction of the area and is minimally relieved with wearing loose-fitting shirts. No prior treatments. Denies recent falls and injuries. Patient has history of iron-deficiency anemia which is managed by  her PCP Jun Nice. She also saw hematology Dr. Lakshmi Wheeler for a consult regarding this in July 2022. She states she is planning to receive iron infusions every 6 months, last infusion July 2022. She has had one blood transfusions back in October for hemoglobin of 6.6. She states normally she is \"around 9\". She denies any current dizziness, lightheadedness, weakness, or fatigue. Functional Capacity per pt:  1) Pt is able to walk 2 city blocks on level ground without SOB. 2) Pt is able to climb 2 flights of stairs without SOB. 3) Pt is able to walk up a hill for 1-2 city blocks without SOB.     Past Medical History:     Past Medical History:   Diagnosis Date    GERD (gastroesophageal reflux disease)     History of blood transfusion 10/2022    Low hemoglobin    Hyperlipidemia     not on rx    Hypertension     managed per pcp Kidney stone     Major depressive disorder, recurrent episode, mild ([facility] [address] 10/21/2015    Palmira Hollingsworth    Obesity, Class III, BMI 40-49.9 (morbid obesity) (HCC)     TRISHA (obstructive sleep apnea)     History of, no longer an issue    Pre-diabetes     Resolved    Presence of tooth-root and mandibular implants     all top teeth    Tachycardia     per pt. Saw Dr. Dewayne Dejesus 8/2020    Wears contact lenses     Instructed not to wear contacts for surgery        Past Surgical History:     Past Surgical History:   Procedure Laterality Date    ABDOMINOPLASTY  10/2022    [address]      x2    SLEEVE GASTRECTOMY N/A 04/05/2021    XI LAPAROSCOPIC ROBOTIC GASTRECTOMY SLEEVE, HIATAL HERNIA REPAIR, EGD - GI UNIT SCHEDULED. performed by Isra Prince DO at [address] [place] [address]      6years old    UPPER GASTROINTESTINAL ENDOSCOPY N/A 01/29/2021    EGD BIOPSY performed by Isra Prince DO at [facility]        Medications Prior to Admission:     Prior to Admission medications    Medication Sig Start Date End Date Taking?  Authorizing Provider   Probiotic Product (PROBIOTIC PO) Take 1 caplet by mouth daily   Yes Historical Provider, MD   amLODIPine (NORVASC) 10 MG tablet Take 1 tablet by mouth daily 10/24/22   Markoldine LUZ Gomez - CNP   spironolactone (ALDACTONE) 25 MG tablet TAKE 1 TABLET BY MOUTH IN THE MORNING  Patient not taking: Reported on 2/6/2023 10/24/22   Gearldine Jason APRN - CNP   carvedilol (COREG) 12.5 MG tablet Take 1 tablet by mouth 2 times daily 9/30/22   Markoldine LUZ Gomez - CNP   losartan (COZAAR) 100 MG tablet Take 1 tablet by mouth daily 9/30/22   Gearldine Jason, APRN - CNP   vitamin D (ERGOCALCIFEROL) 1.25 MG (19209 UT) CAPS capsule Take 1 capsule by mouth once a week for 8 doses  Patient taking differently: Take 50,000 Units by mouth once a week Thursdays 6/6/22 12/12/22  LUZ Cottrell CNP   Multiple Vitamins-Minerals (MULTIVITAMIN WITH MINERALS) tablet Take 2 tablets by mouth daily 21   Corwin Hernandez APRN - CNP        Allergies:     Patient has no known allergies. Social History:     Tobacco:    reports that she has never smoked. She has never used smokeless tobacco.  Alcohol:      reports no history of alcohol use. Drug Use:  reports no history of drug use. Family History:     Family History   Problem Relation Age of Onset    Hypertension Mother     Asthma Mother     Hypertension Maternal Aunt     Asthma Maternal Uncle     Hypertension Maternal Uncle     Arthritis Maternal Grandmother     Hypertension Maternal Grandmother     Stroke Maternal Grandmother     Breast Cancer Neg Hx     Cancer Neg Hx     Colon Cancer Neg Hx     Diabetes Neg Hx     Eclampsia Neg Hx     Ovarian Cancer Neg Hx      Labor Neg Hx     Spont Abortions Neg Hx        Review of Systems:     Positive and Negative as described in HPI. CONSTITUTIONAL: Negative for fevers, chills, sweats, fatigue, and weight loss. HEENT: Permanent upper dental implants. Negative for glasses, hearing changes, rhinorrhea, and throat pain. RESPIRATORY: Hx of sleep apnea in past- \"not issue since gastric sleeve\". Negative for shortness of breath, cough, congestion, and wheezing. CARDIOVASCULAR: Hypertension- managed by PCP. Hx of hyperlipidemia in past- not currently. Negative for chest pain, blood clot, irregular heartbeat, and palpitations. GASTROINTESTINAL: GERD. Hx gastric sleeve. Negative for reflux, nausea, vomiting, diarrhea, constipation, change in bowel habits, and abdominal pain. GENITOURINARY: Negative for difficulty of urination, burning with urination, and frequency. INTEGUMENT: Bilateral scar \"folds\" on hip area. Negative for rash, skin lesions, and easy bruising. HEMATOLOGIC/LYMPHATIC: Anemia Negative for swelling/edema. ALLERGIC/IMMUNOLOGIC:  Negative for urticaria and itching. ENDOCRINE: Pre-diabetic in past- no longer.  Negative for increase in thirst, increase in urination, and heat or cold intolerance. MUSCULOSKELETAL:  Negative joint pains, muscle aches, and swelling of joints. NEUROLOGICAL:  Negative for headaches, dizziness, lightheadedness, numbness, and tingling extremities. BEHAVIOR/PSYCH: Depression. Negative for anxiety. Physical Exam:   BP (!) 142/91   Pulse 86   Resp 14   Ht 5' 4\" (1.626 m)   Wt 140 lb 6.4 oz (63.7 kg)   SpO2 99%   BMI 24.10 kg/m²   No LMP recorded. [id number]  No results for input(s): POCGLU in the last 72 hours. General Appearance:  Alert, well appearing, and in no acute distress. Mental status:  Oriented to person, place, and time. Head:  Normocephalic and atraumatic. Eye:  No icterus, redness, pupils equal and reactive, extraocular eye movements intact, and conjunctiva clear. Ear:  Hearing grossly intact. Nose:  No drainage noted. Mouth:  Mucous membranes moist.  Neck:  Supple and no carotid bruits noted. Lungs: Bilateral equal air entry, clear to auscultation, no wheezing, rales or rhonchi, and normal effort. Cardiovascular: Normal rate, regular rhythm, no murmur, gallop, or rub. Abdomen:  Soft, nontender, nondistended, and active bowel sounds. Neurologic:  Normal speech and cranial nerves II through XII grossly intact. Strength 5/5 bilaterally. Skin: Bilateral skin folds present over scar on hip area. No gross lesions, rashes, bruising, or bleeding on exposed skin area. Extremities:  Posterior tibial pulses 2+ bilaterally. No pedal edema. No calf tenderness with palpation. Psych:  Normal affect. Investigations:      Laboratory Testing:  No results found for this or any previous visit (from the past 24 hour(s)). No results for input(s): HGB, HCT, WBC, MCV, PLATELET, NA, K, CL, CO2, BUN, CREATININE, GLUCOSE, INR, PROTIME, APTT, AST, ALT, LABALBU, HCG in the last 720 hours. No results for input(s): COVID19 in the last 720 hours.     *Please note that labs listed above are the most recent lab values available in EPIC at the time of the visit and additional labs may have been drawn or resulted since that time. Imaging/Diagnostics:      No results found. EK2022 at [place]- will request records. Diagnosis:      1. Cicatrix [L90.5]    Plans:     1.  EXCISION CICATRIX SCAR ABDOMEN      LUZ Vigil - CNP  2023  10:11 AM

## 2023-02-06 NOTE — H&P
History and Physical Service   [place] [address]    HISTORY AND PHYSICAL EXAMINATION            Date of Evaluation: 2/6/2023  Patient name:  Maciej Moya  MRN:   [id number]  YOB: 1980  PCP:    LUZ Tamayo CNP    History Obtained From:     Patient, medical records    History of Present Illness: This is Maciej Moya a 38 y.o. female who presents for a pre-admission testing appointment for an upcoming [facility] 19 by Whit Richardson DO scheduled on 02/17/2023 at 1105 due to Cicatrix [L90.5]. The patient's chief complaint is bilateral scar bulging an discomfort that has progressively worsened over the past 3 months following her abdominoplasty surgery in October. She had sleeve gastrectomy in April 2021 and had significant weight loss >100 lbs since surgery. Scar pain is aggravated by wearing tight clothing or any constriction of the area and is minimally relieved with wearing loose-fitting shirts. No prior treatments. Denies recent falls and injuries. Patient has history of iron-deficiency anemia which is managed by  her PCP Ismael Diaz. She also saw hematology Dr. Shimon Milian for a consult regarding this in July 2022. She states she is planning to receive iron infusions every 6 months, last infusion July 2022. She has had one blood transfusions back in October for hemoglobin of 6.6. She states normally she is \"around 9\". She denies any current dizziness, lightheadedness, weakness, or fatigue. Functional Capacity per pt:  1) Pt is able to walk 2 city blocks on level ground without SOB. 2) Pt is able to climb 2 flights of stairs without SOB. 3) Pt is able to walk up a hill for 1-2 city blocks without SOB.     Past Medical History:     Past Medical History:   Diagnosis Date    GERD (gastroesophageal reflux disease)     History of blood transfusion 10/2022    Low hemoglobin    Hyperlipidemia     not on rx    Hypertension     managed per pcp Kidney stone     Major depressive disorder, recurrent episode, mild ([address] 10/21/2015    Palmira Hollingsworth    Obesity, Class III, BMI 40-49.9 (morbid obesity) (HCC)     TRISHA (obstructive sleep apnea)     History of, no longer an issue    Pre-diabetes     Resolved    Presence of tooth-root and mandibular implants     all top teeth    Tachycardia     per pt. Saw Dr. Jada Campa 8/2020    Wears contact lenses     Instructed not to wear contacts for surgery        Past Surgical History:     Past Surgical History:   Procedure Laterality Date    ABDOMINOPLASTY  10/2022    [address]      x2    SLEEVE GASTRECTOMY N/A 04/05/2021    XI LAPAROSCOPIC ROBOTIC GASTRECTOMY SLEEVE, HIATAL HERNIA REPAIR, EGD - GI UNIT SCHEDULED. performed by Brandt Landis DO at [address] [place] [address]      6years old    UPPER GASTROINTESTINAL ENDOSCOPY N/A 01/29/2021    [facility]GD BIOPSY [facility] DO [facility]        Medications Prior to Admission:     Prior to Admission medications    Medication Sig Start Date End Date Taking?  Authorizing Provider   Probiotic Product (PROBIOTIC PO) Take 1 caplet by mouth daily   Yes Historical Provider, MD   amLODIPine (NORVASC) 10 MG tablet Take 1 tablet by mouth daily 10/24/22   Keenan Lesches, APRN - CNP   spironolactone (ALDACTONE) 25 MG tablet TAKE 1 TABLET BY MOUTH IN THE MORNING  Patient not taking: Reported on 2/6/2023 10/24/22   Keenan Lesches, APRN - CNP   carvedilol (COREG) 12.5 MG tablet Take 1 tablet by mouth 2 times daily 9/30/22   Keenan Lesches, APRN - CNP   losartan (COZAAR) 100 MG tablet Take 1 tablet by mouth daily 9/30/22   Keenan Lesches, APRN - CNP   vitamin D (ERGOCALCIFEROL) 1.25 MG (20820 UT) CAPS capsule Take 1 capsule by mouth once a week for 8 doses  Patient taking differently: Take 50,000 Units by mouth once a week Thursdays 6/6/22 12/12/22  LUZ Chaney CNP   Multiple Vitamins-Minerals (MULTIVITAMIN WITH MINERALS) tablet Take 2 tablets by mouth daily 21   LUZ Leon - CNP        Allergies:     Patient has no known allergies. Social History:     Tobacco:    reports that she has never smoked. She has never used smokeless tobacco.  Alcohol:      reports no history of alcohol use. Drug Use:  reports no history of drug use. Family History:     Family History   Problem Relation Age of Onset    Hypertension Mother     Asthma Mother     Hypertension Maternal Aunt     Asthma Maternal Uncle     Hypertension Maternal Uncle     Arthritis Maternal Grandmother     Hypertension Maternal Grandmother     Stroke Maternal Grandmother     Breast Cancer Neg Hx     Cancer Neg Hx     Colon Cancer Neg Hx     Diabetes Neg Hx     Eclampsia Neg Hx     Ovarian Cancer Neg Hx      Labor Neg Hx     Spont Abortions Neg Hx        Review of Systems:     Positive and Negative as described in HPI. CONSTITUTIONAL: Negative for fevers, chills, sweats, fatigue, and weight loss. HEENT: Permanent upper dental implants. Negative for glasses, hearing changes, rhinorrhea, and throat pain. RESPIRATORY: Hx of sleep apnea in past- \"not issue since gastric sleeve\". Negative for shortness of breath, cough, congestion, and wheezing. CARDIOVASCULAR: Hypertension- managed by PCP. Hx of hyperlipidemia in past- not currently. Negative for chest pain, blood clot, irregular heartbeat, and palpitations. GASTROINTESTINAL: GERD. Hx gastric sleeve. Negative for reflux, nausea, vomiting, diarrhea, constipation, change in bowel habits, and abdominal pain. GENITOURINARY: Negative for difficulty of urination, burning with urination, and frequency. INTEGUMENT: Bilateral scar \"folds\" on hip area. Negative for rash, skin lesions, and easy bruising. HEMATOLOGIC/LYMPHATIC: Anemia Negative for swelling/edema. ALLERGIC/IMMUNOLOGIC:  Negative for urticaria and itching. ENDOCRINE: Pre-diabetic in past- no longer.  Negative for increase in thirst, increase in urination, and heat or cold intolerance. MUSCULOSKELETAL:  Negative joint pains, muscle aches, and swelling of joints. NEUROLOGICAL:  Negative for headaches, dizziness, lightheadedness, numbness, and tingling extremities. BEHAVIOR/PSYCH: Depression. Negative for anxiety. Physical Exam:   BP (!) 142/91   Pulse 86   Resp 14   Ht 5' 4\" (1.626 m)   Wt 140 lb 6.4 oz (63.7 kg)   SpO2 99%   BMI 24.10 kg/m²   No LMP recorded. [id number]  No results for input(s): POCGLU in the last 72 hours. General Appearance:  Alert, well appearing, and in no acute distress. Mental status:  Oriented to person, place, and time. Head:  Normocephalic and atraumatic. Eye:  No icterus, redness, pupils equal and reactive, extraocular eye movements intact, and conjunctiva clear. Ear:  Hearing grossly intact. Nose:  No drainage noted. Mouth:  Mucous membranes moist.  Neck:  Supple and no carotid bruits noted. Lungs: Bilateral equal air entry, clear to auscultation, no wheezing, rales or rhonchi, and normal effort. Cardiovascular: Normal rate, regular rhythm, no murmur, gallop, or rub. Abdomen:  Soft, nontender, nondistended, and active bowel sounds. Neurologic:  Normal speech and cranial nerves II through XII grossly intact. Strength 5/5 bilaterally. Skin: Bilateral skin folds present over scar on hip area. No gross lesions, rashes, bruising, or bleeding on exposed skin area. Extremities:  Posterior tibial pulses 2+ bilaterally. No pedal edema. No calf tenderness with palpation. Psych:  Normal affect. Investigations:      Laboratory Testing:  No results found for this or any previous visit (from the past 24 hour(s)). No results for input(s): HGB, HCT, WBC, MCV, PLATELET, NA, K, CL, CO2, BUN, CREATININE, GLUCOSE, INR, PROTIME, APTT, AST, ALT, LABALBU, HCG in the last 720 hours. No results for input(s): COVID19 in the last 720 hours.     *Please note that labs listed above are the most recent lab values available in EPIC at the time of the visit and additional labs may have been drawn or resulted since that time. Imaging/Diagnostics:      No results found. EK2022 at [address]- will request records. Diagnosis:      1. Cicatrix [L90.5]    Plans:     1.  EXCISION CICATRIX SCAR ABDOMEN      LUZ Vieira CNP  2023  10:11 AM

## 2023-02-06 NOTE — PRE-PROCEDURE INSTRUCTIONS
On the Day of Your Surgery, Friday, 2/17/2023, Please Arrive At 9:05 AM     Enter the hospital through the Main Entrance, take the lobby elevators to the second floor and check in at the Surgery Registration desk. Continue to take your home medications as you normally do up to and including the night before surgery with the exception of blood thinning medications. Blood Thinning Medications:  Please stop prescription blood thinning medications such as Apixaban (Eliquis); Clopidogrel (Plavix); Dabigatran (Pradaxa); Prasugrel (Effient); Rivaroxaban (Xarelto); Ticagrelor (Brilinta); Warfarin (Coumadin) only as directed by your surgeon and/or the prescribing physician    Some common examples of other medications that can thin your blood are: Aspirin, Ibuprofen (Advil, Motrin), Naproxen (Aleve), Meloxicam (Mobic), Celecoxib (Celebrex), Fish Oil, many Herbal Supplements. These medications should usually be stopped at least 7 days prior to surgery. Tylenol is OK to take for pain the week prior to surgery. Failure to stop certain medications may interfere with your scheduled surgery. If you receive instructions from your surgeon regarding what medications to stop prior to surgery, please follow those specific instructions. Please take the following medication(s) the day of surgery with small sips of water:              Carvedilol, norvasc, losartan    Showering Before Surgery: You can play an important role in your own health by carefully washing before surgery. Shower the night before and the morning of surgery using the instructions below. If you are allergic to Chlorhexidine Gluconate (CHG) use antibacterial soap instead. If you were given Chlorhexidine soap, please follow the instructions included with the soap to shower the night before and the morning of surgery. If you were not given Chlorhexidine, please shower or bathe the morning of surgery using an antibacterial soap.   Please dress in clean clothing after showering. General information about Chlorhexidine Gluconate (CHG)   * It should not be used on hair, face, ears, genital area or skin that is not intact. * It should not be used if breast feeding. * It should not be used if you allergic to CHG. * See the bottle for additional information. Do Not apply any powder, deodorant, lotion/cream/oil, perfume/aftershave, cosmetics, or alcohol-based skin or hair products after showering. Do Not shave near the planned surgical site unless specifically instructed to.     1. Wash your hair using your normal shampoo and rinse. 2. Wash your face and genital area (privates) using your own shampoo and rinse. 3. Turn off the shower water and pour half of the bottle of CHG onto a clean washcloth. 4. Wash your body from neck down to toes. Pay special attention to your surgical site. 5. Leave the CHG on your skin for 5 minutes and rinse it off.   6. Pat dry with a clean towel, sleep in clean clothes and clean sheets. 7. Do not shave near the surgical site. 8. Repeat process the morning of surgery. CHG may cause dry skin, but should not cause redness, rash, or intense itching. If an suspect an allergic reaction, use your own soap and shampoo for the morning of surgery and report reaction to the pre-operative nurse. Additional Instructions:      1. If you are having any type of anesthesia, you are to have NOTHING to eat or drink after midnight the night before surgery. This includes no gum, hard candy, mints or water. The only exception to this is small sips of water to take the medications listed above. No smoking or chewing tobacco after midnight. No alcoholic beverages for 24 hours prior to surgery. 2. Brush your teeth but do not swallow any water. 3. If you wear glasses bring a case for them if you have one. No contacts should be worn the day of surgery. You may also bring your hearing aids.  If you have dentures, most surgical procedures involving anesthesia will require that you remove them prior to surgery. 4. If you sleep with a CPAP or BiPAP machine at home and plan on staying in the hospital overnight after surgery, please bring your machine with you. 5. Do not wear any jewelry or body piercings the day of surgery. No nail polish on the operative extremity (arm/hand or leg/foot surgeries)   6. If you are staying overnight with us, you may bring a small bag of necessary personal items. 7. Please wear loose, comfortable clothing. If you are potentially going to have a cast, sling, brace or bulky dressing, make sure to wear clothing that will fit over it. 8. In case of illness - If you have cold or flu like symptoms (high fever, runny nose, sore throat, cough, etc.) rash, nausea, vomiting, loose stools, and/or recent contact with someone who has a contagious disease (chicken pox, measles, COVID-19, etc.). Please call your surgeon before coming to the hospital.    Transportation After Your Surgery/Procedure: If you are going home the same day of surgery you need someone to drive you home. Your  must be at least 25years of age. A taxi cab or other nonmedical public transportation is not acceptable unless you have someone to ride home in the vehicle with you. For your safety, someone must remain with you for the first 24 hours after your surgery if you receive anesthesia or medication. If you do not have someone to stay with you, your procedure may be cancelled. As a patient at [facility] you can expect quality medical and nursing care that is centered on you individual needs. Our goal is to make your surgical experience as comfortable as possible.     Any questions about preparing for your surgery please call [phone].      ____________________________   ____________________________  Signature (Patient)                                 Signature (Nurse)                     Date

## 2023-02-07 ENCOUNTER — OFFICE VISIT (OUTPATIENT)
Dept: FAMILY MEDICINE CLINIC | Age: 43
End: 2023-02-07
Payer: COMMERCIAL

## 2023-02-07 VITALS
WEIGHT: 140.6 LBS | DIASTOLIC BLOOD PRESSURE: 94 MMHG | BODY MASS INDEX: 24.13 KG/M2 | SYSTOLIC BLOOD PRESSURE: 142 MMHG | OXYGEN SATURATION: 100 % | TEMPERATURE: 97.2 F | HEART RATE: 80 BPM

## 2023-02-07 DIAGNOSIS — I10 ESSENTIAL HYPERTENSION: ICD-10-CM

## 2023-02-07 DIAGNOSIS — F43.23 ADJUSTMENT DISORDER WITH MIXED ANXIETY AND DEPRESSED MOOD: Primary | ICD-10-CM

## 2023-02-07 DIAGNOSIS — N92.0 MENORRHAGIA WITH REGULAR CYCLE: ICD-10-CM

## 2023-02-07 PROCEDURE — G8484 FLU IMMUNIZE NO ADMIN: HCPCS | Performed by: NURSE PRACTITIONER

## 2023-02-07 PROCEDURE — 99214 OFFICE O/P EST MOD 30 MIN: CPT | Performed by: NURSE PRACTITIONER

## 2023-02-07 PROCEDURE — G8427 DOCREV CUR MEDS BY ELIG CLIN: HCPCS | Performed by: NURSE PRACTITIONER

## 2023-02-07 PROCEDURE — 3080F DIAST BP >= 90 MM HG: CPT | Performed by: NURSE PRACTITIONER

## 2023-02-07 PROCEDURE — G8420 CALC BMI NORM PARAMETERS: HCPCS | Performed by: NURSE PRACTITIONER

## 2023-02-07 PROCEDURE — 1036F TOBACCO NON-USER: CPT | Performed by: NURSE PRACTITIONER

## 2023-02-07 PROCEDURE — 3077F SYST BP >= 140 MM HG: CPT | Performed by: NURSE PRACTITIONER

## 2023-02-07 RX ORDER — ESCITALOPRAM OXALATE 10 MG/1
10 TABLET ORAL DAILY
Qty: 90 TABLET | Refills: 2
Start: 2023-02-07

## 2023-02-07 SDOH — ECONOMIC STABILITY: FOOD INSECURITY: WITHIN THE PAST 12 MONTHS, THE FOOD YOU BOUGHT JUST DIDN'T LAST AND YOU DIDN'T HAVE MONEY TO GET MORE.: NEVER TRUE

## 2023-02-07 SDOH — ECONOMIC STABILITY: HOUSING INSECURITY
IN THE LAST 12 MONTHS, WAS THERE A TIME WHEN YOU DID NOT HAVE A STEADY PLACE TO SLEEP OR SLEPT IN A SHELTER (INCLUDING NOW)?: NO

## 2023-02-07 SDOH — ECONOMIC STABILITY: INCOME INSECURITY: HOW HARD IS IT FOR YOU TO PAY FOR THE VERY BASICS LIKE FOOD, HOUSING, MEDICAL CARE, AND HEATING?: NOT HARD AT ALL

## 2023-02-07 SDOH — ECONOMIC STABILITY: FOOD INSECURITY: WITHIN THE PAST 12 MONTHS, YOU WORRIED THAT YOUR FOOD WOULD RUN OUT BEFORE YOU GOT MONEY TO BUY MORE.: NEVER TRUE

## 2023-02-07 ASSESSMENT — PATIENT HEALTH QUESTIONNAIRE - PHQ9
1. LITTLE INTEREST OR PLEASURE IN DOING THINGS: 0
8. MOVING OR SPEAKING SO SLOWLY THAT OTHER PEOPLE COULD HAVE NOTICED. OR THE OPPOSITE, BEING SO FIGETY OR RESTLESS THAT YOU HAVE BEEN MOVING AROUND A LOT MORE THAN USUAL: 0
SUM OF ALL RESPONSES TO PHQ9 QUESTIONS 1 & 2: 0
7. TROUBLE CONCENTRATING ON THINGS, SUCH AS READING THE NEWSPAPER OR WATCHING TELEVISION: 0
3. TROUBLE FALLING OR STAYING ASLEEP: 0
6. FEELING BAD ABOUT YOURSELF - OR THAT YOU ARE A FAILURE OR HAVE LET YOURSELF OR YOUR FAMILY DOWN: 0
SUM OF ALL RESPONSES TO PHQ QUESTIONS 1-9: 0
2. FEELING DOWN, DEPRESSED OR HOPELESS: 0
4. FEELING TIRED OR HAVING LITTLE ENERGY: 0
9. THOUGHTS THAT YOU WOULD BE BETTER OFF DEAD, OR OF HURTING YOURSELF: 0
SUM OF ALL RESPONSES TO PHQ QUESTIONS 1-9: 0
SUM OF ALL RESPONSES TO PHQ QUESTIONS 1-9: 0
10. IF YOU CHECKED OFF ANY PROBLEMS, HOW DIFFICULT HAVE THESE PROBLEMS MADE IT FOR YOU TO DO YOUR WORK, TAKE CARE OF THINGS AT HOME, OR GET ALONG WITH OTHER PEOPLE: 0
5. POOR APPETITE OR OVEREATING: 0
SUM OF ALL RESPONSES TO PHQ QUESTIONS 1-9: 0

## 2023-02-07 ASSESSMENT — ENCOUNTER SYMPTOMS
WHEEZING: 0
NAUSEA: 0
RESPIRATORY NEGATIVE: 1
GASTROINTESTINAL NEGATIVE: 1
COLOR CHANGE: 0
COUGH: 0
DIARRHEA: 0
VOMITING: 0
ALLERGIC/IMMUNOLOGIC NEGATIVE: 1
CHEST TIGHTNESS: 0
EYES NEGATIVE: 1
SHORTNESS OF BREATH: 0

## 2023-02-07 NOTE — FLOWSHEET NOTE
02/07/23 1512   Anesthesia PAT Clearance   Anesthesia Review Status Anes has reviewed patient for surgery  (Dr. Munira Ordoñez reviewed family practive last office note with no further intervention required)

## 2023-02-07 NOTE — PROGRESS NOTES
[facility]  [address] [facility]  Dept: [phone]    Mattie Treadwell is a 38 y.o. female who presents today for her medical conditions/complaintsas noted below. Mattie Treadwell is here today c/o Forms (FMLA)    Past Medical History:   Diagnosis Date    Anemia     GERD (gastroesophageal reflux disease)     History of blood transfusion 10/2022    Low hemoglobin after abdominoplasty. Hyperlipidemia     not on rx    Hypertension     managed per pcp    Kidney stone     Major depressive disorder, recurrent episode, mild ([address] 10/21/2015    Palmira Hollingsworth    Obesity, Class III, BMI 40-49.9 (morbid obesity) ([address]     Resolved. TRISHA (obstructive sleep apnea)     Resolved. Pre-diabetes     Resolved. Presence of tooth-root and mandibular implants     all top teeth    Tachycardia     per pt.  Saw Dr. Edgar Loredo 2020    Wears contact lenses     Instructed not to wear contacts for surgery      Past Surgical History:   Procedure Laterality Date    ABDOMINOPLASTY  10/2022    [address]      x2    SLEEVE GASTRECTOMY N/A 2021    XI LAPAROSCOPIC ROBOTIC GASTRECTOMY SLEEVE, HIATAL HERNIA REPAIR, EGD - GI UNIT SCHEDULED. performed by Roby Murillo DO at [address] [place] [address]      6years old    UPPER GASTROINTESTINAL ENDOSCOPY N/A 2021    EGD BIOPSY performed by Roby Murillo DO at [facility]       Family History   Problem Relation Age of Onset    Hypertension Mother     Asthma Mother     Hypertension Maternal Aunt     Asthma Maternal Uncle     Hypertension Maternal Uncle     Arthritis Maternal Grandmother     Hypertension Maternal Grandmother     Stroke Maternal Grandmother     Breast Cancer Neg Hx     Cancer Neg Hx     Colon Cancer Neg Hx     Diabetes Neg Hx     Eclampsia Neg Hx     Ovarian Cancer Neg Hx      Labor Neg Hx     Spont Abortions Neg Hx        Social History     Tobacco Use    Smoking status: Never Smokeless tobacco: Never   Substance Use Topics    Alcohol use: No     Alcohol/week: 0.0 standard drinks      Current Outpatient Medications   Medication Sig Dispense Refill    escitalopram (LEXAPRO) 10 MG tablet Take 1 tablet by mouth daily 90 tablet 2    Probiotic Product (PROBIOTIC PO) Take 1 caplet by mouth daily      amLODIPine (NORVASC) 10 MG tablet Take 1 tablet by mouth daily 90 tablet 1    spironolactone (ALDACTONE) 25 MG tablet TAKE 1 TABLET BY MOUTH IN THE MORNING (Patient not taking: Reported on 2/6/2023) 90 tablet 1    carvedilol (COREG) 12.5 MG tablet Take 1 tablet by mouth 2 times daily 180 tablet 1    losartan (COZAAR) 100 MG tablet Take 1 tablet by mouth daily 90 tablet 1    vitamin D (ERGOCALCIFEROL) 1.25 MG (61646 UT) CAPS capsule Take 1 capsule by mouth once a week for 8 doses (Patient taking differently: Take 50,000 Units by mouth once a week Thursdays) 8 capsule 0    Multiple Vitamins-Minerals (MULTIVITAMIN WITH MINERALS) tablet Take 2 tablets by mouth daily 30 tablet 0     No current facility-administered medications for this visit. No Known Allergies      HPI:     HPI    Presents today c/o follow-up stress / FMLA  Triggers include work & co-worker  Reports intermittent panic attacks, shortness of breath, crying spells, depression, etc.  Has tried counseling briefly but didn't like how it made her feel  Has taken Lexapro with improvement in the past  Would like to continue FMLA , she feels she needs a mental health day every 2 weeks or so   PHQ 9 - 0    H/o HTN  Reports blood pressure has been well controlled at home    H/o heavy periods  H/o anemia  H/o bariatric surgery  Report she does not have a GYN to follow w/   She has had iron infusions in the past     Health Maintenance:      Subjective:     Review of Systems   Constitutional: Negative. Negative for appetite change, chills, diaphoresis, fatigue, fever and unexpected weight change. HENT: Negative. Eyes: Negative. Respiratory: Negative. Negative for cough, chest tightness, shortness of breath and wheezing. Cardiovascular: Negative. Negative for chest pain and leg swelling. Gastrointestinal: Negative. Negative for diarrhea, nausea and vomiting. Endocrine: Negative. Genitourinary:  Positive for menstrual problem and vaginal bleeding. Negative for difficulty urinating. Musculoskeletal: Negative. Skin: Negative. Negative for color change, pallor, rash and wound. Allergic/Immunologic: Negative. Neurological: Negative. Negative for dizziness, seizures, syncope, light-headedness and headaches. Psychiatric/Behavioral:  Positive for dysphoric mood. Negative for sleep disturbance. The patient is nervous/anxious. Objective:     Vitals:    02/07/23 1606 02/07/23 1633   BP: (!) 140/98 (!) 142/94   Pulse: 80    Temp: 97.2 °F (36.2 °C)    TempSrc: Temporal    SpO2: 100%    Weight: 140 lb 9.6 oz (63.8 kg)           Body mass index is 24.13 kg/m². Physical Exam  Constitutional:       General: She is not in acute distress. Appearance: Normal appearance. She is well-developed and normal weight. She is not ill-appearing, toxic-appearing or diaphoretic. HENT:      Head: Normocephalic and atraumatic. Right Ear: External ear normal.      Left Ear: External ear normal.      Nose: Nose normal.   Eyes:      General: No scleral icterus. Right eye: No discharge. Left eye: No discharge. Conjunctiva/sclera: Conjunctivae normal.      Pupils: Pupils are equal, round, and reactive to light. Neck:      Trachea: No tracheal deviation. Cardiovascular:      Rate and Rhythm: Normal rate and regular rhythm. Heart sounds: Normal heart sounds. No murmur heard. No friction rub. No gallop. Pulmonary:      Effort: Pulmonary effort is normal. No tachypnea, accessory muscle usage or respiratory distress. Breath sounds: Normal breath sounds. No stridor.  No decreased breath sounds, wheezing, rhonchi or rales. Abdominal:      Palpations: Abdomen is soft. Musculoskeletal:         General: No tenderness or deformity. Normal range of motion. Cervical back: Normal range of motion and neck supple. Skin:     General: Skin is warm and dry. Coloration: Skin is not pale. Findings: No erythema or rash. Neurological:      Mental Status: She is alert and oriented to person, place, and time. GCS: GCS eye subscore is 4. GCS verbal subscore is 5. GCS motor subscore is 6. Gait: Gait is intact. Gait normal.   Psychiatric:         Attention and Perception: Attention and perception normal.         Mood and Affect: Mood is anxious. Affect is tearful. Speech: Speech normal.         Behavior: Behavior normal.         Thought Content: Thought content normal.         Judgment: Judgment normal.         Assessment:         1. Adjustment disorder with mixed anxiety and depressed mood    2. Essential hypertension    3. Menorrhagia with regular cycle        Plan:     1. Adjustment disorder with mixed anxiety and depressed mood    - escitalopram (LEXAPRO) 10 MG tablet; Take 1 tablet by mouth daily  Dispense: 90 tablet; Refill: 2    Continue SSRI  I discussed the importance of counseling in management of anxiety / depressive sx juan in light of FMLA & patient agrees to retry   I asked her to contact insurance regarding covered providers  Paperwork completed     2. Essential hypertension    Reports controlled readings at home, continue current medications   F/u as scheduled     3. Menorrhagia with regular cycle    - [facility]      Orders Placed This Encounter   Medications    escitalopram (LEXAPRO) 10 MG tablet     Sig: Take 1 tablet by mouth daily     Dispense:  90 tablet     Refill:  2         Discussed use, benefit, and side effects of prescribed medications. All patient questions answered. Pt voiced understanding. Reviewed health maintenance. Instructed to continue current medications, diet and exercise.  Patient agreedwith treatment plan. Follow up as directed.     Electronically signed by LUZ Yuen CNP on 2/7/2023

## 2023-02-10 ENCOUNTER — ANESTHESIA EVENT (OUTPATIENT)
Dept: OPERATING ROOM | Age: 43
End: 2023-02-10
Payer: COMMERCIAL

## 2023-02-16 DIAGNOSIS — J30.9 ALLERGIC RHINITIS, UNSPECIFIED SEASONALITY, UNSPECIFIED TRIGGER: ICD-10-CM

## 2023-02-16 DIAGNOSIS — R94.2 ABNORMAL PFTS (PULMONARY FUNCTION TESTS): ICD-10-CM

## 2023-02-17 ENCOUNTER — ANESTHESIA (OUTPATIENT)
Dept: OPERATING ROOM | Age: 43
End: 2023-02-17
Payer: COMMERCIAL

## 2023-02-17 ENCOUNTER — HOSPITAL ENCOUNTER (OUTPATIENT)
Age: 43
Setting detail: OUTPATIENT SURGERY
Discharge: HOME | End: 2023-02-17
Attending: SURGERY | Admitting: SURGERY
Payer: COMMERCIAL

## 2023-02-17 VITALS
OXYGEN SATURATION: 97 % | TEMPERATURE: 97.5 F | SYSTOLIC BLOOD PRESSURE: 157 MMHG | RESPIRATION RATE: 17 BRPM | DIASTOLIC BLOOD PRESSURE: 84 MMHG | BODY MASS INDEX: 24.03 KG/M2 | WEIGHT: 140 LBS | HEART RATE: 71 BPM

## 2023-02-17 DIAGNOSIS — L90.5 CICATRIX: ICD-10-CM

## 2023-02-17 DIAGNOSIS — G89.18 ACUTE POST-OPERATIVE PAIN: Primary | ICD-10-CM

## 2023-02-17 LAB — HCG, PREGNANCY URINE (POC): NEGATIVE

## 2023-02-17 PROCEDURE — 3600000012 HC SURGERY LEVEL 2 ADDTL 15MIN: Performed by: SURGERY

## 2023-02-17 PROCEDURE — 88304 TISSUE EXAM BY PATHOLOGIST: CPT

## 2023-02-17 PROCEDURE — 3600000002 HC SURGERY LEVEL 2 BASE: Performed by: SURGERY

## 2023-02-17 PROCEDURE — 6360000002 HC RX W HCPCS: Performed by: ANESTHESIOLOGY

## 2023-02-17 PROCEDURE — 3700000001 HC ADD 15 MINUTES (ANESTHESIA): Performed by: SURGERY

## 2023-02-17 PROCEDURE — 2500000003 HC RX 250 WO HCPCS: Performed by: NURSE ANESTHETIST, CERTIFIED REGISTERED

## 2023-02-17 PROCEDURE — 6360000002 HC RX W HCPCS: Performed by: NURSE ANESTHETIST, CERTIFIED REGISTERED

## 2023-02-17 PROCEDURE — 2500000003 HC RX 250 WO HCPCS: Performed by: SURGERY

## 2023-02-17 PROCEDURE — 2580000003 HC RX 258: Performed by: ANESTHESIOLOGY

## 2023-02-17 PROCEDURE — L0450 TLSO FLEX TRUNK/THOR PRE OTS: HCPCS | Performed by: SURGERY

## 2023-02-17 PROCEDURE — 2709999900 HC NON-CHARGEABLE SUPPLY: Performed by: SURGERY

## 2023-02-17 PROCEDURE — 6360000002 HC RX W HCPCS: Performed by: SURGERY

## 2023-02-17 PROCEDURE — 7100000011 HC PHASE II RECOVERY - ADDTL 15 MIN: Performed by: SURGERY

## 2023-02-17 PROCEDURE — 3700000000 HC ANESTHESIA ATTENDED CARE: Performed by: SURGERY

## 2023-02-17 PROCEDURE — 81025 URINE PREGNANCY TEST: CPT

## 2023-02-17 PROCEDURE — 7100000010 HC PHASE II RECOVERY - FIRST 15 MIN: Performed by: SURGERY

## 2023-02-17 RX ORDER — SODIUM CHLORIDE 0.9 % (FLUSH) 0.9 %
5-40 SYRINGE (ML) INJECTION EVERY 12 HOURS SCHEDULED
Status: DISCONTINUED | OUTPATIENT
Start: 2023-02-17 | End: 2023-02-17 | Stop reason: HOSPADM

## 2023-02-17 RX ORDER — MIDAZOLAM HYDROCHLORIDE 1 MG/ML
INJECTION INTRAMUSCULAR; INTRAVENOUS PRN
Status: DISCONTINUED | OUTPATIENT
Start: 2023-02-17 | End: 2023-02-17 | Stop reason: SDUPTHER

## 2023-02-17 RX ORDER — ONDANSETRON 2 MG/ML
4 INJECTION INTRAMUSCULAR; INTRAVENOUS
Status: DISCONTINUED | OUTPATIENT
Start: 2023-02-17 | End: 2023-02-17 | Stop reason: HOSPADM

## 2023-02-17 RX ORDER — LIDOCAINE HYDROCHLORIDE 10 MG/ML
1 INJECTION, SOLUTION EPIDURAL; INFILTRATION; INTRACAUDAL; PERINEURAL
Status: DISCONTINUED | OUTPATIENT
Start: 2023-02-18 | End: 2023-02-17 | Stop reason: HOSPADM

## 2023-02-17 RX ORDER — PROPOFOL 10 MG/ML
INJECTION, EMULSION INTRAVENOUS CONTINUOUS PRN
Status: DISCONTINUED | OUTPATIENT
Start: 2023-02-17 | End: 2023-02-17 | Stop reason: SDUPTHER

## 2023-02-17 RX ORDER — FENTANYL CITRATE 50 UG/ML
25 INJECTION, SOLUTION INTRAMUSCULAR; INTRAVENOUS EVERY 5 MIN PRN
Status: DISCONTINUED | OUTPATIENT
Start: 2023-02-17 | End: 2023-02-17 | Stop reason: HOSPADM

## 2023-02-17 RX ORDER — SODIUM CHLORIDE, SODIUM LACTATE, POTASSIUM CHLORIDE, CALCIUM CHLORIDE 600; 310; 30; 20 MG/100ML; MG/100ML; MG/100ML; MG/100ML
INJECTION, SOLUTION INTRAVENOUS CONTINUOUS
Status: DISCONTINUED | OUTPATIENT
Start: 2023-02-17 | End: 2023-02-17 | Stop reason: HOSPADM

## 2023-02-17 RX ORDER — OXYCODONE HYDROCHLORIDE AND ACETAMINOPHEN 5; 325 MG/1; MG/1
1 TABLET ORAL EVERY 8 HOURS PRN
Qty: 21 TABLET | Refills: 0 | Status: SHIPPED | OUTPATIENT
Start: 2023-02-17 | End: 2023-02-24

## 2023-02-17 RX ORDER — DOCUSATE SODIUM 100 MG/1
100 CAPSULE, LIQUID FILLED ORAL 2 TIMES DAILY
Qty: 28 CAPSULE | Refills: 0 | Status: SHIPPED | OUTPATIENT
Start: 2023-02-17 | End: 2023-03-03

## 2023-02-17 RX ORDER — FENTANYL CITRATE 50 UG/ML
INJECTION, SOLUTION INTRAMUSCULAR; INTRAVENOUS PRN
Status: DISCONTINUED | OUTPATIENT
Start: 2023-02-17 | End: 2023-02-17 | Stop reason: SDUPTHER

## 2023-02-17 RX ORDER — SODIUM CHLORIDE 9 MG/ML
INJECTION, SOLUTION INTRAVENOUS PRN
Status: DISCONTINUED | OUTPATIENT
Start: 2023-02-17 | End: 2023-02-17 | Stop reason: HOSPADM

## 2023-02-17 RX ORDER — HYDROMORPHONE HYDROCHLORIDE 1 MG/ML
0.5 INJECTION, SOLUTION INTRAMUSCULAR; INTRAVENOUS; SUBCUTANEOUS EVERY 5 MIN PRN
Status: DISCONTINUED | OUTPATIENT
Start: 2023-02-17 | End: 2023-02-17 | Stop reason: HOSPADM

## 2023-02-17 RX ORDER — SODIUM CHLORIDE 9 MG/ML
INJECTION, SOLUTION INTRAVENOUS CONTINUOUS
Status: DISCONTINUED | OUTPATIENT
Start: 2023-02-17 | End: 2023-02-17 | Stop reason: HOSPADM

## 2023-02-17 RX ORDER — ONDANSETRON 4 MG/1
4 TABLET, ORALLY DISINTEGRATING ORAL 3 TIMES DAILY PRN
Qty: 21 TABLET | Refills: 0 | Status: SHIPPED | OUTPATIENT
Start: 2023-02-17

## 2023-02-17 RX ORDER — SODIUM CHLORIDE 0.9 % (FLUSH) 0.9 %
5-40 SYRINGE (ML) INJECTION PRN
Status: DISCONTINUED | OUTPATIENT
Start: 2023-02-17 | End: 2023-02-17 | Stop reason: HOSPADM

## 2023-02-17 RX ORDER — LIDOCAINE HYDROCHLORIDE 20 MG/ML
INJECTION, SOLUTION EPIDURAL; INFILTRATION; INTRACAUDAL; PERINEURAL PRN
Status: DISCONTINUED | OUTPATIENT
Start: 2023-02-17 | End: 2023-02-17 | Stop reason: SDUPTHER

## 2023-02-17 RX ADMIN — FENTANYL CITRATE 50 MCG: 50 INJECTION INTRAMUSCULAR; INTRAVENOUS at 10:55

## 2023-02-17 RX ADMIN — SODIUM CHLORIDE, POTASSIUM CHLORIDE, SODIUM LACTATE AND CALCIUM CHLORIDE: 600; 310; 30; 20 INJECTION, SOLUTION INTRAVENOUS at 10:03

## 2023-02-17 RX ADMIN — LIDOCAINE HYDROCHLORIDE 60 MG: 20 INJECTION, SOLUTION EPIDURAL; INFILTRATION; INTRACAUDAL; PERINEURAL at 10:55

## 2023-02-17 RX ADMIN — PROPOFOL 75 MCG/KG/MIN: 10 INJECTION, EMULSION INTRAVENOUS at 10:55

## 2023-02-17 RX ADMIN — MIDAZOLAM 2 MG: 1 INJECTION INTRAMUSCULAR; INTRAVENOUS at 10:50

## 2023-02-17 RX ADMIN — Medication 2000 MG: at 11:00

## 2023-02-17 RX ADMIN — FENTANYL CITRATE 25 MCG: 50 INJECTION INTRAMUSCULAR; INTRAVENOUS at 11:05

## 2023-02-17 RX ADMIN — FENTANYL CITRATE 25 MCG: 50 INJECTION INTRAMUSCULAR; INTRAVENOUS at 12:20

## 2023-02-17 ASSESSMENT — PAIN - FUNCTIONAL ASSESSMENT
PAIN_FUNCTIONAL_ASSESSMENT: 0-10
PAIN_FUNCTIONAL_ASSESSMENT: ACTIVITIES ARE NOT PREVENTED

## 2023-02-17 ASSESSMENT — PAIN DESCRIPTION - ORIENTATION: ORIENTATION: RIGHT

## 2023-02-17 ASSESSMENT — PAIN SCALES - GENERAL: PAINLEVEL_OUTOF10: 6

## 2023-02-17 ASSESSMENT — PAIN DESCRIPTION - DESCRIPTORS: DESCRIPTORS: BURNING

## 2023-02-17 ASSESSMENT — PAIN DESCRIPTION - LOCATION: LOCATION: ABDOMEN

## 2023-02-17 NOTE — ANESTHESIA PRE PROCEDURE
Department of Anesthesiology  Preprocedure Note       Name:  Casey Watts   Age:  38 y.o.  :  1980                                          MRN:  [id number]         Date:  2023      Surgeon: Brea Rich):  Rona Barlow DO    Procedure: Procedure(s):  EXCISION CICATRIX SCAR ABDOMEN    Medications prior to admission:   Prior to Admission medications    Medication Sig Start Date End Date Taking? Authorizing Provider   escitalopram (LEXAPRO) 10 MG tablet Take 1 tablet by mouth daily 23   LUZ Cooley CNP   Probiotic Product (PROBIOTIC PO) Take 1 caplet by mouth daily    Historical Provider, MD   amLODIPine (NORVASC) 10 MG tablet Take 1 tablet by mouth daily 10/24/22   LUZ Cooley CNP   spironolactone (ALDACTONE) 25 MG tablet TAKE 1 TABLET BY MOUTH IN THE MORNING 10/24/22   LUZ Cooley CNP   carvedilol (COREG) 12.5 MG tablet Take 1 tablet by mouth 2 times daily 22   LUZ Cooley CNP   losartan (COZAAR) 100 MG tablet Take 1 tablet by mouth daily 22   LUZ Cooley CNP   vitamin D (ERGOCALCIFEROL) 1.25 MG (57694 UT) CAPS capsule Take 1 capsule by mouth once a week for 8 doses  Patient taking differently: Take 50,000 Units by mouth once a week 22  LUZ Farah CNP   Multiple Vitamins-Minerals (MULTIVITAMIN WITH MINERALS) tablet Take 2 tablets by mouth daily 21   LUZ Cooley CNP       Current medications:    No current facility-administered medications for this visit. No current outpatient medications on file.      Facility-Administered Medications Ordered in Other Visits   Medication Dose Route Frequency Provider Last Rate Last Admin    [START ON 2023] lidocaine PF 1 % injection 1 mL  1 mL IntraDERmal Once PRN Amarilis Calvillo MD        0.9 % sodium chloride infusion   IntraVENous Continuous Amarilis Calvillo MD        lactated ringers IV soln infusion   IntraVENous Continuous Amarilis Calvillo MD  ceFAZolin (ANCEF) 2000 mg in 0.9% sodium chloride 50 mL IVPB  2,000 mg IntraVENous Once Reji Chávez DO           Allergies:  No Known Allergies    Problem List:    Patient Active Problem List   Diagnosis Code    Hyperlipidemia E78.5    Normocytic anemia D64.9    Essential hypertension I10    Hypokalemia E87.6    Vitamin D deficiency E55.9    Iron deficiency anemia D50.9    Thiamine deficiency E51.9    S/P laparoscopic sleeve gastrectomy Z98.84    Major depressive disorder, recurrent episode, moderate with anxious distress (HCC) F33.1    Iron malabsorption K90.9    Iron deficiency anemia secondary to inadequate dietary iron intake D50.8       Past Medical History:        Diagnosis Date    Anemia     GERD (gastroesophageal reflux disease)     History of blood transfusion 10/2022    Low hemoglobin after abdominoplasty.  Hyperlipidemia     not on rx    Hypertension     managed per pcp    Kidney stone     Major depressive disorder, recurrent episode, mild ([address] 10/21/2015    Palmira Hollingsworth    Obesity, Class III, BMI 40-49.9 (morbid obesity) ([address]     Resolved.  TRISHA (obstructive sleep apnea)     Resolved.  Pre-diabetes     Resolved.  Presence of tooth-root and mandibular implants     all top teeth    Tachycardia     per pt.  Saw Dr. Melene Aschoff 2020    Wears contact lenses     Instructed not to wear contacts for surgery       Past Surgical History:        Procedure Laterality Date    ABDOM[facility]Y  10/2022    [facility]     SECTION      x2    SLEEVE GASTRECTOMY N/A 2021    XI LAPAROSCOPIC ROBOTIC GASTRECTOMY SLEEVE, HIATAL HERNIA REPAIR, EGD - GI UNIT SCHEDULED. performed by Edwige Starkey DO at [address] [place] [address]      6years [facility] ENDOSCOPY N/A 2021    EGD BIOPSY performed by Ewdige Starkey DO at [facility]       Social History:    Social History     Tobacco Use    Smoking status: Never    Smokeless tobacco: Never   Substance Use Topics    Alcohol use: No     Alcohol/week: 0.0 standard drinks                                Counseling given: Not Answered      Vital Signs (Current): There were no vitals filed for this visit. BP Readings from Last 3 Encounters:   02/17/23 (!) 157/80   02/06/23 (!) 142/91   02/07/23 (!) 142/94       NPO Status:                                                                                 BMI:   Wt Readings from Last 3 Encounters:   02/17/23 140 lb (63.5 kg)   02/06/23 140 lb 6.4 oz (63.7 kg)   02/07/23 140 lb 9.6 oz (63.8 kg)     There is no height or weight on file to calculate BMI.    CBC:   Lab Results   Component Value Date/Time    WBC 6.7 02/06/2023 10:04 AM    RBC 4.38 02/06/2023 10:04 AM    HGB 12.0 02/06/2023 10:04 AM    HCT 38.4 02/06/2023 10:04 AM    MCV 87.7 02/06/2023 10:04 AM    RDW 13.7 02/06/2023 10:04 AM     02/06/2023 10:04 AM       CMP:   Lab Results   Component Value Date/Time     02/06/2023 10:04 AM    K 3.8 02/06/2023 10:04 AM     02/06/2023 10:04 AM    CO2 27 02/06/2023 10:04 AM    BUN 11 02/06/2023 10:04 AM    CREATININE 0.78 02/06/2023 10:04 AM    GFRAA >60 09/28/2022 08:01 AM    LABGLOM >60 02/06/2023 10:04 AM    GLUCOSE 112 11/06/2022 02:19 PM    PROT 7.1 11/06/2022 02:19 PM    CALCIUM 8.8 11/06/2022 02:19 PM    BILITOT 0.4 11/06/2022 02:19 PM    ALKPHOS 50 11/06/2022 02:19 PM    AST 11 11/06/2022 02:19 PM    ALT 7 11/06/2022 02:19 PM       POC Tests:   No results for input(s): POCGLU, POCNA, POCK, POCCL, POCBUN, POCHEMO, POCHCT in the last 72 hours.     Coags:   Lab Results   Component Value Date/Time    PROTIME 13.1 11/06/2022 02:19 PM    INR 1.0 11/06/2022 02:19 PM    APTT 24.5 03/29/2021 03:35 PM       HCG (If Applicable):   Lab Results   Component Value Date    PREGTESTUR  01/08/2022      Comment:      neg    HCG NEGATIVE 04/05/2021    HCGQUANT <1 11/06/2022        ABGs: No results found for: PHART, PO2ART, MAI3HYC, XQW1NDZ, BEART, Y3IOVIKS     Type & Screen (If Applicable):  No results found for: LABABO, LABRH    Drug/Infectious Status (If Applicable):  No results found for: HIV, HEPCAB    COVID-19 Screening (If Applicable):   Lab Results   Component Value Date/Time    COVID19 Not Detected 04/01/2021 07:10 PM    COVID19 Not Detected 08/02/2020 09:20 AM           Anesthesia Evaluation  Patient summary reviewed and Nursing notes reviewed no history of anesthetic complications:   Airway: Mallampati: II  TM distance: >3 FB   Neck ROM: full  Mouth opening: > = 3 FB   Dental: normal exam         Pulmonary:normal exam    (+) sleep apnea: on CPAP,                             Cardiovascular:  Exercise tolerance: good (>4 METS),   (+) hypertension:, hyperlipidemia    (-) past MI, CAD, CABG/stent, dysrhythmias,  angina and  CHF    ECG reviewed  Rhythm: regular  Rate: normal           Beta Blocker:  Not on Beta Blocker         Neuro/Psych:   (+) neuromuscular disease:, psychiatric history:            GI/Hepatic/Renal:   (+) GERD: well controlled, morbid obesity          Endo/Other: Negative Endo/Other ROS                    Abdominal:             Vascular: Other Findings:             Anesthesia Plan      general     ASA 3     (Ett)  Induction: intravenous. MIPS: Postoperative opioids intended and Prophylactic antiemetics administered. Anesthetic plan and risks discussed with patient.         Attending anesthesiologist reviewed and agrees with Preprocedure content                Javier Parkinson MD   2/17/2023

## 2023-02-17 NOTE — DISCHARGE INSTRUCTIONS
Patient Discharge Instructions  Discharge Date:  2/17/2023    HYGEINE: Mohini Jean Marie to shower, no soaking in a tub/pool until seen at your follow up appointment. Clean incision daily with gentle soap and water, do not use alcohol/peroxide or any harsh cleansers. DRIVING: No driving while taking narcotic medications or while in pain    ACTIVITY: As tolerated. Wear loose fitting clothes. Avoid significant pressure or rubbing against the surgical site. DIET: Resume your normal diet as advised by your PCP. MEDICATIONS: Take all medications as prescribed. SPECIAL INSTRUCTIONS:     Follow up with Dr. Betty Best  in 10-14 days, call the clinic for appointment. Call sooner if fever above 100.4 degrees Farenheit, increase in swelling or redness, thick purulent discharge or pain not controlled with medications.

## 2023-02-17 NOTE — OP NOTE
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]    Post-Op Diagnosis: Same       Procedure(s):  EXCISION CICATRIX SCAR ABDOMEN    Surgeon(s):  Jacklyn Castelan DO    Assistant:   Resident: Manju Deshpande DO    Anesthesia: General    Estimated Blood Loss (mL): Minimal    Complications: None    Specimens:   ID Type Source Tests Collected by Time Destination   A : right abdomenal cicatrix Tissue Abdomen SURGICAL PATHOLOGY Jacklyn Castelan DO 2/17/2023 1116        Implants:  * No implants in log *      Drains: * No LDAs found *    Findings: right lateral cicatrix scar     Detailed Description of Procedure: The patient is a 40-year-old female with history of abdominoplasty in October 2022 who presented for an elective right lateral abdominal cicatrix scar excision and revision. The procedure, alternatives, risk were discussed with the patient and all questions were answered. The consent was signed, witnessed, placed in the patient chart. Presurgical markings were done with the patient in standing position and the area of concern was traced in an elliptical manner with surgical marker. The patient was taken to the operating room placed on the operating room table in supine position. IV sedation was induced under anesthesia guidelines. The patient was then placed in a left lateral decubitus position and secured in place with a beanbag VAC bolster. The area was then prepped and draped in the usual sterile fashion. A timeout was performed verifying the correct patient, procedure, site, side, positioning prior to beginning. The area of concern that had previously been marked prior to surgery was examined measuring 20 x 5 cm. The elliptical incision was first sharply incised followed by electrocautery through the dermis and subcutaneous tissues.   This was done in a circumferential manner until full depth incision was complete. The overlying skin was then retracted with Allis clamp and the overlying tissue including the cicatrix scar was amputated with electrocautery. The depth of the full-thickness incision was estimated 1 cm. Hemostasis was controlled electrocautery. The dermis was brought together with interrupted, buried sutures using 3-0 Vicryl. The overlying skin was brought together with 5-0 Monocryl in a running subcuticular fashion. The overlying skin was then covered with a layer of Dermabond. This concluded the procedure. A dressing of sterile gauze and coverall was placed over the surgical site. All sponges and instruments were accounted for at the end of the case. The patient tolerated the procedure well, and was taken to the postanesthesia care unit in stable condition. Dr. Moe Ramirez was present and scrubbed for the entire case. Electronically signed by Morena Kolb DO on 2/17/2023 at 12:00 PM  This note is created with the assistance of a speech recognition program.  While intending to generate a document that actually reflects the content of the visit, the document can still have some errors including those of syntax and sound a like substitutions which may escape proof reading. In such instances, actual meaning can be extrapolated by contextual diversion.

## 2023-02-17 NOTE — TELEPHONE ENCOUNTER
Message left on recorder to call back to see if she is still using the medication.  This Rx was removed from med list in December

## 2023-02-17 NOTE — ANESTHESIA POSTPROCEDURE EVALUATION
Department of Anesthesiology  Postprocedure Note    Patient: Pato Walker  MRN: [id number]  YOB: 1980  Date of evaluation: 2/17/2023      Procedure Summary     Date: 02/17/23 Room / Location: [address] [place] [address] / [facility] - INPATIENT    Anesthesia Start: 1050 Anesthesia Stop: 1150    Procedure: [address] (Abdomen) Diagnosis:       Cicatrix      (Cicatrix [L90.5])    Surgeons: Jude Juarez DO Responsible Provider: Carrie Rodriguez MD    Anesthesia Type: general ASA Status: 3          Anesthesia Type: No value filed.     Wong Phase I:      Wong Phase II: Wong Score: 10      Anesthesia Post Evaluation    Patient location during evaluation: PACU  Patient participation: complete - patient participated  Level of consciousness: awake  Airway patency: patent  Nausea & Vomiting: no nausea  Complications: no  Cardiovascular status: blood pressure returned to baseline  Respiratory status: acceptable  Hydration status: euvolemic  Comments: Multimodal analgesia pain management as indicated by procedure  Multimodal analgesia pain management approach

## 2023-02-17 NOTE — INTERVAL H&P NOTE
Interval H&P Note    Pt Name: Ning Martinez  MRN: [id number]  YOB: 1980  Date of evaluation: 2/17/2023      [x] I have reviewed in epic the H&P by AJITH Charles CNP done in [facility] for the Interval History and Physical note. [x] I have examined  Ning Martinez  There are no changes to the patient who is scheduled for EXCISION CICATRIX SCAR ABDOMEN by Tri Chávez,  FOR CICATRIX [L90.5]. The patient denies new health changes, fever, chills, wheezing, cough, increased SOB, chest pain, open sores or wounds. Vital signs: BP (!) 157/80   Pulse 76   Temp 98 °F (36.7 °C) (Temporal)   Resp 18   Wt 140 lb (63.5 kg)   LMP 02/01/2023   SpO2 100%   BMI 24.03 kg/m²     Allergies:  Patient has no known allergies. Medications:    Prior to Admission medications    Medication Sig Start Date End Date Taking?  Authorizing Provider   escitalopram (LEXAPRO) 10 MG tablet Take 1 tablet by mouth daily  Patient not taking: Reported on 2/17/2023 2/7/23   LUZ Hancock CNP   Probiotic Product (PROBIOTIC PO) Take 1 caplet by mouth daily    Historical Provider, MD   amLODIPine (NORVASC) 10 MG tablet Take 1 tablet by mouth daily 10/24/22   LUZ Hancock CNP   spironolactone (ALDACTONE) 25 MG tablet TAKE 1 TABLET BY MOUTH IN THE MORNING 10/24/22   LUZ Hancock CNP   carvedilol (COREG) 12.5 MG tablet Take 1 tablet by mouth 2 times daily 9/30/22   LUZ Hancock CNP   losartan (COZAAR) 100 MG tablet Take 1 tablet by mouth daily 9/30/22   LUZ Hancock CNP   vitamin D (ERGOCALCIFEROL) 1.25 MG (79303 UT) CAPS capsule Take 1 capsule by mouth once a week for 8 doses  Patient taking differently: Take 50,000 Units by mouth once a week Thursdays 6/6/22 12/12/22  LUZ Ames CNP   Multiple Vitamins-Minerals (MULTIVITAMIN WITH MINERALS) tablet Take 2 tablets by mouth daily 4/29/21   Jane Lack, APRN - CNP         This is a 38 y.o. female who is pleasant, cooperative, alert and oriented x3, in no acute distress. Colored contact lenses removed     Heart: Heart sounds are normal.  HR 76 regular rate and rhythm without murmur, gallop or rub. Lungs: Normal respiratory effort with equal expansion, diminished breath sounds, unlabored without wheezes or rales bilaterally   Abdomen: soft, nontender, nondistended with bowel sounds . Labs:  Recent Labs     02/17/23  0941 02/06/23  1004   HGB  --  12.0   HCT  --  38.4   WBC  --  6.7   MCV  --  87.7   PLT  --  323   NA  --  139   K  --  3.8   CL  --  104   CO2  --  27   BUN  --  11   CREATININE  --  0.78   HCG NEGATIVE  --        No results for input(s): COVID19 in the last 720 hours.     LUZ Dyer CNP  Electronically signed 2/17/2023 at 9:59 AM

## 2023-02-20 LAB — SURGICAL PATHOLOGY REPORT: NORMAL

## 2023-02-20 RX ORDER — MONTELUKAST SODIUM 10 MG/1
10 TABLET ORAL DAILY
Qty: 30 TABLET | Refills: 10 | OUTPATIENT
Start: 2023-02-20

## 2023-02-22 DIAGNOSIS — R94.2 ABNORMAL PFTS (PULMONARY FUNCTION TESTS): ICD-10-CM

## 2023-02-22 DIAGNOSIS — J30.9 ALLERGIC RHINITIS, UNSPECIFIED SEASONALITY, UNSPECIFIED TRIGGER: ICD-10-CM

## 2023-02-23 RX ORDER — MONTELUKAST SODIUM 10 MG/1
10 TABLET ORAL DAILY
Qty: 30 TABLET | Refills: 10 | OUTPATIENT
Start: 2023-02-23

## 2023-02-26 DIAGNOSIS — I10 ESSENTIAL HYPERTENSION: ICD-10-CM

## 2023-02-27 RX ORDER — CARVEDILOL 12.5 MG/1
TABLET ORAL
Qty: 90 TABLET | Refills: 1 | Status: SHIPPED | OUTPATIENT
Start: 2023-02-27

## 2023-02-28 DIAGNOSIS — J30.9 ALLERGIC RHINITIS, UNSPECIFIED SEASONALITY, UNSPECIFIED TRIGGER: ICD-10-CM

## 2023-02-28 DIAGNOSIS — R94.2 ABNORMAL PFTS (PULMONARY FUNCTION TESTS): ICD-10-CM

## 2023-03-02 RX ORDER — MONTELUKAST SODIUM 10 MG/1
10 TABLET ORAL DAILY
Qty: 30 TABLET | Refills: 10 | OUTPATIENT
Start: 2023-03-02

## 2023-03-06 DIAGNOSIS — J30.9 ALLERGIC RHINITIS, UNSPECIFIED SEASONALITY, UNSPECIFIED TRIGGER: ICD-10-CM

## 2023-03-06 DIAGNOSIS — R94.2 ABNORMAL PFTS (PULMONARY FUNCTION TESTS): ICD-10-CM

## 2023-03-07 RX ORDER — MONTELUKAST SODIUM 10 MG/1
10 TABLET ORAL DAILY
Qty: 30 TABLET | Refills: 10 | OUTPATIENT
Start: 2023-03-07

## 2023-03-09 DIAGNOSIS — R94.2 ABNORMAL PFTS (PULMONARY FUNCTION TESTS): ICD-10-CM

## 2023-03-09 DIAGNOSIS — J30.9 ALLERGIC RHINITIS, UNSPECIFIED SEASONALITY, UNSPECIFIED TRIGGER: ICD-10-CM

## 2023-03-09 RX ORDER — MONTELUKAST SODIUM 10 MG/1
10 TABLET ORAL DAILY
Qty: 30 TABLET | Refills: 10 | OUTPATIENT
Start: 2023-03-09

## 2023-03-15 DIAGNOSIS — R94.2 ABNORMAL PFTS (PULMONARY FUNCTION TESTS): ICD-10-CM

## 2023-03-15 DIAGNOSIS — J30.9 ALLERGIC RHINITIS, UNSPECIFIED SEASONALITY, UNSPECIFIED TRIGGER: ICD-10-CM

## 2023-03-16 RX ORDER — MONTELUKAST SODIUM 10 MG/1
10 TABLET ORAL DAILY
Qty: 30 TABLET | Refills: 10 | OUTPATIENT
Start: 2023-03-16

## 2023-03-21 NOTE — PROGRESS NOTES
[facility]  [address] [facility]  Dept: [phone]    Judy Arias is a 35 y.o. female who presents today for her medical conditions/complaintsas noted below. Judy Arias is here today c/o Hypertension    Past Medical History:   Diagnosis Date    GERD (gastroesophageal reflux disease)     Hyperlipidemia     not on rx    Hypertension     managed per pcp    Kidney stone     Major depressive disorder, recurrent episode, mild ([facility] [address] 10/21/2015    Palmira Hollingsworth    Obesity, Class III, BMI 40-49.9 (morbid obesity) ([facility] [address]     TRISHA (obstructive sleep apnea)     Saw Dr. Rich Carmen for clearance for surgery    Pre-diabetes     diet control    Presence of tooth-root and mandibular implants     all top teeth    Tachycardia     per pt.  Saw Dr. Harry Castillo 2020    Wears contact lenses     Instructed not to wear contacts for surgery      Past Surgical History:   Procedure Laterality Date     SECTION      x1    SLEEVE GASTRECTOMY  2021    LAPAROSCOPIC ROBOTIC GASTRECTOMY SLEEVE, HIATAL HERNIA REPAIR, EGD     SLEEVE GASTRECTOMY N/A 2021    XI LAPAROSCOPIC ROBOTIC GASTRECTOMY SLEEVE, HIATAL HERNIA REPAIR, EGD - GI UNIT SCHEDULED. performed by Kevon Parsons DO at [address]  ENDOSCOPY N/A 2021    EGD BIOPSY performed by Kevon Parsons DO at [facility]       Family History   Problem Relation Age of Onset    Hypertension Mother     Asthma Mother     Hypertension Maternal Aunt     Asthma Maternal Uncle     Hypertension Maternal Uncle     Arthritis Maternal Grandmother     Hypertension Maternal Grandmother     Stroke Maternal Grandmother     Breast Cancer Neg Hx     Cancer Neg Hx     Colon Cancer Neg Hx     Diabetes Neg Hx     Eclampsia Neg Hx     Ovarian Cancer Neg Hx      Labor Neg Hx     Spont Abortions Neg Hx        Social History     Tobacco Use    Smoking status: Never Smoker    Smokeless tobacco: Never Used   Substance Use Topics    Alcohol use: No     Alcohol/week: 0.0 standard drinks      Current Outpatient Medications   Medication Sig Dispense Refill    vitamin D (ERGOCALCIFEROL) 1.25 MG (12344 UT) CAPS capsule Take 1 capsule by mouth once a week for 8 doses 8 capsule 0    ferrous sulfate (IRON 325) 325 (65 Fe) MG tablet Take 1 tablet by mouth daily (with breakfast) 90 tablet 1    amLODIPine (NORVASC) 5 MG tablet       potassium chloride (KLOR-CON M) 20 MEQ extended release tablet TAKE 2 TABLETS BY MOUTH ONCE DAILY 60 tablet 5    montelukast (SINGULAIR) 10 MG tablet TAKE 1 TABLET BY MOUTH DAILY 30 tablet 5    tamsulosin (FLOMAX) 0.4 MG capsule       Multiple Vitamins-Minerals (MULTIVITAMIN WITH MINERALS) tablet Take 2 tablets by mouth daily 30 tablet 0    Metoprolol Succinate 25 MG CS24 Take by mouth 1/2 tab daily per Dr. Tian Rivera       No current facility-administered medications for this visit. No Known Allergies      HPI:     HPI    Presents today via virtual visit c/o HTN / hypokalemia     H/o HTN - amlodipine, metoprolol  Uncertain the doses she is taking currently   Not monitoring blood pressure regularly at home   Declines any related headaches, dizziness, etc.   Has lost 70-80 pounds post bariatric surgery   Exercising on a regular basis   Diet is normal , small portion sizes due to bariatric surgery   Doesn't drink caffeine     H/o hypokalemia - K supplement     Health Maintenance:      Subjective:     Review of Systems   Constitutional: Negative. Negative for appetite change, chills, diaphoresis, fatigue, fever and unexpected weight change. HENT: Negative. Eyes: Negative. Respiratory: Negative. Negative for cough, chest tightness and shortness of breath. Cardiovascular: Negative. Negative for chest pain and leg swelling. Gastrointestinal: Negative. Negative for diarrhea, nausea and vomiting. Endocrine: Negative.     Genitourinary: Negative. Negative for difficulty urinating. Musculoskeletal: Negative. Skin: Negative. Negative for color change, pallor, rash and wound. Allergic/Immunologic: Negative. Neurological: Negative. Negative for dizziness, seizures, syncope, light-headedness and headaches. Hematological: Negative. Psychiatric/Behavioral: Negative. Negative for dysphoric mood. The patient is not nervous/anxious. Objective:     Patient-Reported Vitals 2/10/2022   Patient-Reported Weight 147   Patient-Reported Height 55   Patient-Reported Systolic 023   Patient-Reported Diastolic 90   Patient-Reported Pulse 139   Patient-Reported Temperature 145   Patient-Reported SpO2 258   Patient-Reported Peak Flow 234        Physical Exam  Constitutional:       General: She is not in acute distress. Appearance: Normal appearance. She is not ill-appearing, toxic-appearing or diaphoretic. HENT:      Head: Normocephalic and atraumatic. Right Ear: External ear normal.      Left Ear: External ear normal.      Nose: Nose normal.      Mouth/Throat:      Pharynx: Oropharynx is clear. No oropharyngeal exudate or posterior oropharyngeal erythema. Eyes:      General: No scleral icterus. Right eye: No discharge. Left eye: No discharge. Conjunctiva/sclera: Conjunctivae normal.   Cardiovascular:      Rate and Rhythm: Normal rate. Pulmonary:      Effort: Pulmonary effort is normal. No respiratory distress. Musculoskeletal:         General: Normal range of motion. Cervical back: Normal range of motion. Skin:     General: Skin is warm and dry. Coloration: Skin is not jaundiced or pale. Findings: No bruising, erythema, lesion or rash. Neurological:      Mental Status: She is alert and oriented to person, place, and time. Gait: Gait is intact. Psychiatric:         Mood and Affect: Mood normal.         Behavior: Behavior normal.         Thought Content:  Thought content normal. Judgment: Judgment normal.           Assessment:         1. Essential hypertension    2. Hypokalemia        Plan:     1. Essential hypertension    BP above goal, previous log reviewed   Patient will get back to me with exact meds / doses she is taking so I can further adjust  Lab work reviewed with patient  Lifestyle modifications encouraged  F/u 1 month in office for BP check     2. Hypokalemia    K normal, labs reviewed, continue med    Bernabe Jaramillo, was evaluated through a synchronous (real-time) audio-video encounter. The patient (or guardian if applicable) is aware that this is a billable service, which includes applicable co-pays. This Virtual Visit was conducted with patient's (and/or legal guardian's) consent. The visit was conducted pursuant to the emergency declaration under the [address] [place] [address] authority and the [employer] General Act. Patient identification was verified, and a caregiver was present when appropriate. The patient was located at home in a state where the provider was licensed to provide care. Total time spent for this encounter: Not billed by time    --LUZ Holcomb CNP on 2/10/2022 at 11:57 AM    An electronic signature was used to authenticate this note. Discussed use, benefit, and side effects of prescribed medications. All patient questions answered. Pt voiced understanding. Reviewed health maintenance. Instructed to continue current medications, diet and exercise. Patient agreedwith treatment plan. Follow up as directed.      Electronically signed by LUZ Holcomb CNP on 2/10/2022 Rituxan Counseling:  I discussed with the patient the risks of Rituxan infusions. Side effects can include infusion reactions, severe drug rashes including mucocutaneous reactions, reactivation of latent hepatitis and other infections and rarely progressive multifocal leukoencephalopathy.  All of the patient's questions and concerns were addressed.

## 2023-03-23 RX ORDER — MONTELUKAST SODIUM 10 MG/1
TABLET ORAL
Qty: 30 TABLET | Refills: 10 | OUTPATIENT
Start: 2023-03-23

## 2023-04-30 DIAGNOSIS — I10 ESSENTIAL HYPERTENSION: ICD-10-CM

## 2023-05-01 RX ORDER — LOSARTAN POTASSIUM 100 MG/1
TABLET ORAL
Qty: 90 TABLET | Refills: 1 | Status: ON HOLD | OUTPATIENT
Start: 2023-05-01 | End: 2023-06-26 | Stop reason: HOSPADM

## 2023-05-17 DIAGNOSIS — I10 ESSENTIAL HYPERTENSION: ICD-10-CM

## 2023-05-18 RX ORDER — CARVEDILOL 12.5 MG/1
TABLET ORAL
Qty: 180 TABLET | Refills: 1 | Status: SHIPPED | OUTPATIENT
Start: 2023-05-18

## 2023-05-18 NOTE — TELEPHONE ENCOUNTER
Stacy Hernandez is calling to request a refill on the following medication(s):    Medication Request:  Requested Prescriptions     Pending Prescriptions Disp Refills    carvedilol (COREG) 12.5 MG tablet [Pharmacy Med Name: CARVEDILOL 12.5 MG TABS 12.5 Tablet] 180 tablet 1     Sig: TAKE 1 TABLET BY MOUTH TWICE DAILY       Last Visit Date (If Applicable):  7/9/37    Next Visit Date:    6-12-23

## 2023-06-26 ENCOUNTER — HOSPITAL ENCOUNTER (OUTPATIENT)
Age: 43
Setting detail: OUTPATIENT SURGERY
Discharge: HOME | End: 2023-06-26
Attending: SURGERY | Admitting: SURGERY
Payer: COMMERCIAL

## 2023-06-26 ENCOUNTER — ANESTHESIA EVENT (OUTPATIENT)
Dept: OPERATING ROOM | Age: 43
End: 2023-06-26
Payer: COMMERCIAL

## 2023-06-26 ENCOUNTER — ANESTHESIA (OUTPATIENT)
Dept: OPERATING ROOM | Age: 43
End: 2023-06-26
Payer: COMMERCIAL

## 2023-06-26 VITALS
RESPIRATION RATE: 16 BRPM | TEMPERATURE: 97.9 F | OXYGEN SATURATION: 97 % | DIASTOLIC BLOOD PRESSURE: 98 MMHG | SYSTOLIC BLOOD PRESSURE: 160 MMHG | HEART RATE: 68 BPM

## 2023-06-26 DIAGNOSIS — L90.5 CICATRIX: ICD-10-CM

## 2023-06-26 DIAGNOSIS — G89.18 POST-OP PAIN: Primary | ICD-10-CM

## 2023-06-26 LAB — HCG SERPL QL: NEGATIVE

## 2023-06-26 PROCEDURE — 3700000000 HC ANESTHESIA ATTENDED CARE: Performed by: SURGERY

## 2023-06-26 PROCEDURE — 7100000011 HC PHASE II RECOVERY - ADDTL 15 MIN: Performed by: SURGERY

## 2023-06-26 PROCEDURE — 6360000002 HC RX W HCPCS: Performed by: NURSE ANESTHETIST, CERTIFIED REGISTERED

## 2023-06-26 PROCEDURE — 3700000001 HC ADD 15 MINUTES (ANESTHESIA): Performed by: SURGERY

## 2023-06-26 PROCEDURE — 2500000003 HC RX 250 WO HCPCS: Performed by: SURGERY

## 2023-06-26 PROCEDURE — 6360000002 HC RX W HCPCS: Performed by: STUDENT IN AN ORGANIZED HEALTH CARE EDUCATION/TRAINING PROGRAM

## 2023-06-26 PROCEDURE — 6360000002 HC RX W HCPCS: Performed by: SURGERY

## 2023-06-26 PROCEDURE — 88302 TISSUE EXAM BY PATHOLOGIST: CPT

## 2023-06-26 PROCEDURE — 3600000012 HC SURGERY LEVEL 2 ADDTL 15MIN: Performed by: SURGERY

## 2023-06-26 PROCEDURE — 3600000002 HC SURGERY LEVEL 2 BASE: Performed by: SURGERY

## 2023-06-26 PROCEDURE — 84703 CHORIONIC GONADOTROPIN ASSAY: CPT

## 2023-06-26 PROCEDURE — 7100000000 HC PACU RECOVERY - FIRST 15 MIN: Performed by: SURGERY

## 2023-06-26 PROCEDURE — 7100000001 HC PACU RECOVERY - ADDTL 15 MIN: Performed by: SURGERY

## 2023-06-26 PROCEDURE — 2500000003 HC RX 250 WO HCPCS: Performed by: NURSE ANESTHETIST, CERTIFIED REGISTERED

## 2023-06-26 PROCEDURE — 2709999900 HC NON-CHARGEABLE SUPPLY: Performed by: SURGERY

## 2023-06-26 PROCEDURE — 2580000003 HC RX 258: Performed by: ANESTHESIOLOGY

## 2023-06-26 PROCEDURE — 7100000010 HC PHASE II RECOVERY - FIRST 15 MIN: Performed by: SURGERY

## 2023-06-26 RX ORDER — SODIUM CHLORIDE 9 MG/ML
INJECTION, SOLUTION INTRAVENOUS PRN
Status: DISCONTINUED | OUTPATIENT
Start: 2023-06-26 | End: 2023-06-26 | Stop reason: HOSPADM

## 2023-06-26 RX ORDER — SODIUM CHLORIDE 0.9 % (FLUSH) 0.9 %
5-40 SYRINGE (ML) INJECTION PRN
Status: DISCONTINUED | OUTPATIENT
Start: 2023-06-26 | End: 2023-06-26 | Stop reason: HOSPADM

## 2023-06-26 RX ORDER — HYDROCODONE BITARTRATE AND ACETAMINOPHEN 5; 325 MG/1; MG/1
1 TABLET ORAL EVERY 6 HOURS PRN
Qty: 28 TABLET | Refills: 0 | Status: SHIPPED | OUTPATIENT
Start: 2023-06-26 | End: 2023-07-03

## 2023-06-26 RX ORDER — LABETALOL HYDROCHLORIDE 5 MG/ML
10 INJECTION, SOLUTION INTRAVENOUS
Status: DISCONTINUED | OUTPATIENT
Start: 2023-06-26 | End: 2023-06-26 | Stop reason: HOSPADM

## 2023-06-26 RX ORDER — SODIUM CHLORIDE, SODIUM LACTATE, POTASSIUM CHLORIDE, CALCIUM CHLORIDE 600; 310; 30; 20 MG/100ML; MG/100ML; MG/100ML; MG/100ML
INJECTION, SOLUTION INTRAVENOUS CONTINUOUS
Status: DISCONTINUED | OUTPATIENT
Start: 2023-06-26 | End: 2023-06-26 | Stop reason: HOSPADM

## 2023-06-26 RX ORDER — DOCUSATE SODIUM 100 MG/1
100 CAPSULE, LIQUID FILLED ORAL 2 TIMES DAILY
Qty: 20 CAPSULE | Refills: 0 | Status: SHIPPED | OUTPATIENT
Start: 2023-06-26 | End: 2023-07-06

## 2023-06-26 RX ORDER — OXYCODONE HYDROCHLORIDE 5 MG/1
5 TABLET ORAL PRN
Status: DISCONTINUED | OUTPATIENT
Start: 2023-06-26 | End: 2023-06-26 | Stop reason: HOSPADM

## 2023-06-26 RX ORDER — PROCHLORPERAZINE EDISYLATE 5 MG/ML
5 INJECTION INTRAMUSCULAR; INTRAVENOUS
Status: DISCONTINUED | OUTPATIENT
Start: 2023-06-26 | End: 2023-06-26 | Stop reason: HOSPADM

## 2023-06-26 RX ORDER — MIDAZOLAM HYDROCHLORIDE 1 MG/ML
INJECTION INTRAMUSCULAR; INTRAVENOUS PRN
Status: DISCONTINUED | OUTPATIENT
Start: 2023-06-26 | End: 2023-06-26 | Stop reason: SDUPTHER

## 2023-06-26 RX ORDER — DEXAMETHASONE SODIUM PHOSPHATE 10 MG/ML
INJECTION, SOLUTION INTRAMUSCULAR; INTRAVENOUS PRN
Status: DISCONTINUED | OUTPATIENT
Start: 2023-06-26 | End: 2023-06-26 | Stop reason: SDUPTHER

## 2023-06-26 RX ORDER — MORPHINE SULFATE 10 MG/ML
INJECTION INTRAVENOUS PRN
Status: DISCONTINUED | OUTPATIENT
Start: 2023-06-26 | End: 2023-06-26 | Stop reason: SDUPTHER

## 2023-06-26 RX ORDER — ROCURONIUM BROMIDE 10 MG/ML
INJECTION, SOLUTION INTRAVENOUS PRN
Status: DISCONTINUED | OUTPATIENT
Start: 2023-06-26 | End: 2023-06-26 | Stop reason: SDUPTHER

## 2023-06-26 RX ORDER — OXYCODONE HYDROCHLORIDE 5 MG/1
10 TABLET ORAL PRN
Status: DISCONTINUED | OUTPATIENT
Start: 2023-06-26 | End: 2023-06-26 | Stop reason: HOSPADM

## 2023-06-26 RX ORDER — ONDANSETRON 2 MG/ML
INJECTION INTRAMUSCULAR; INTRAVENOUS PRN
Status: DISCONTINUED | OUTPATIENT
Start: 2023-06-26 | End: 2023-06-26 | Stop reason: SDUPTHER

## 2023-06-26 RX ORDER — LIDOCAINE HYDROCHLORIDE 10 MG/ML
1 INJECTION, SOLUTION EPIDURAL; INFILTRATION; INTRACAUDAL; PERINEURAL
Status: DISCONTINUED | OUTPATIENT
Start: 2023-06-27 | End: 2023-06-26 | Stop reason: HOSPADM

## 2023-06-26 RX ORDER — PHENYLEPHRINE HCL IN 0.9% NACL 1 MG/10 ML
SYRINGE (ML) INTRAVENOUS PRN
Status: DISCONTINUED | OUTPATIENT
Start: 2023-06-26 | End: 2023-06-26 | Stop reason: SDUPTHER

## 2023-06-26 RX ORDER — SODIUM CHLORIDE 0.9 % (FLUSH) 0.9 %
5-40 SYRINGE (ML) INJECTION EVERY 12 HOURS SCHEDULED
Status: DISCONTINUED | OUTPATIENT
Start: 2023-06-26 | End: 2023-06-26 | Stop reason: HOSPADM

## 2023-06-26 RX ORDER — PROPOFOL 10 MG/ML
INJECTION, EMULSION INTRAVENOUS PRN
Status: DISCONTINUED | OUTPATIENT
Start: 2023-06-26 | End: 2023-06-26 | Stop reason: SDUPTHER

## 2023-06-26 RX ORDER — FENTANYL CITRATE 50 UG/ML
25 INJECTION, SOLUTION INTRAMUSCULAR; INTRAVENOUS EVERY 5 MIN PRN
Status: DISCONTINUED | OUTPATIENT
Start: 2023-06-26 | End: 2023-06-26 | Stop reason: HOSPADM

## 2023-06-26 RX ORDER — LIDOCAINE HYDROCHLORIDE 20 MG/ML
INJECTION, SOLUTION EPIDURAL; INFILTRATION; INTRACAUDAL; PERINEURAL PRN
Status: DISCONTINUED | OUTPATIENT
Start: 2023-06-26 | End: 2023-06-26 | Stop reason: SDUPTHER

## 2023-06-26 RX ORDER — ONDANSETRON 2 MG/ML
4 INJECTION INTRAMUSCULAR; INTRAVENOUS
Status: DISCONTINUED | OUTPATIENT
Start: 2023-06-26 | End: 2023-06-26 | Stop reason: HOSPADM

## 2023-06-26 RX ORDER — KETOROLAC TROMETHAMINE 30 MG/ML
INJECTION, SOLUTION INTRAMUSCULAR; INTRAVENOUS PRN
Status: DISCONTINUED | OUTPATIENT
Start: 2023-06-26 | End: 2023-06-26 | Stop reason: SDUPTHER

## 2023-06-26 RX ORDER — HYDROMORPHONE HYDROCHLORIDE 1 MG/ML
0.5 INJECTION, SOLUTION INTRAMUSCULAR; INTRAVENOUS; SUBCUTANEOUS EVERY 5 MIN PRN
Status: DISCONTINUED | OUTPATIENT
Start: 2023-06-26 | End: 2023-06-26 | Stop reason: HOSPADM

## 2023-06-26 RX ORDER — SODIUM CHLORIDE 9 MG/ML
INJECTION, SOLUTION INTRAVENOUS CONTINUOUS
Status: DISCONTINUED | OUTPATIENT
Start: 2023-06-26 | End: 2023-06-26

## 2023-06-26 RX ORDER — ONDANSETRON 4 MG/1
4 TABLET, FILM COATED ORAL DAILY PRN
Qty: 20 TABLET | Refills: 0 | Status: SHIPPED | OUTPATIENT
Start: 2023-06-26

## 2023-06-26 RX ORDER — FENTANYL CITRATE 50 UG/ML
INJECTION, SOLUTION INTRAMUSCULAR; INTRAVENOUS PRN
Status: DISCONTINUED | OUTPATIENT
Start: 2023-06-26 | End: 2023-06-26 | Stop reason: SDUPTHER

## 2023-06-26 RX ADMIN — PROPOFOL 100 MG: 10 INJECTION, EMULSION INTRAVENOUS at 10:35

## 2023-06-26 RX ADMIN — MORPHINE SULFATE 10 MG: 10 INJECTION INTRAVENOUS at 11:03

## 2023-06-26 RX ADMIN — ROCURONIUM BROMIDE 30 MG: 10 INJECTION, SOLUTION INTRAVENOUS at 10:34

## 2023-06-26 RX ADMIN — SODIUM CHLORIDE, POTASSIUM CHLORIDE, SODIUM LACTATE AND CALCIUM CHLORIDE: 600; 310; 30; 20 INJECTION, SOLUTION INTRAVENOUS at 09:19

## 2023-06-26 RX ADMIN — MIDAZOLAM 2 MG: 1 INJECTION INTRAMUSCULAR; INTRAVENOUS at 10:23

## 2023-06-26 RX ADMIN — FENTANYL CITRATE 50 MCG: 50 INJECTION INTRAMUSCULAR; INTRAVENOUS at 10:27

## 2023-06-26 RX ADMIN — HYDROMORPHONE HYDROCHLORIDE 0.5 MG: 1 INJECTION, SOLUTION INTRAMUSCULAR; INTRAVENOUS; SUBCUTANEOUS at 13:50

## 2023-06-26 RX ADMIN — SUGAMMADEX 200 MG: 100 INJECTION, SOLUTION INTRAVENOUS at 11:33

## 2023-06-26 RX ADMIN — Medication 200 MCG: at 11:24

## 2023-06-26 RX ADMIN — PROPOFOL 170 MG: 10 INJECTION, EMULSION INTRAVENOUS at 10:27

## 2023-06-26 RX ADMIN — Medication 2000 MG: at 10:38

## 2023-06-26 RX ADMIN — ONDANSETRON 4 MG: 2 INJECTION INTRAMUSCULAR; INTRAVENOUS at 10:35

## 2023-06-26 RX ADMIN — LIDOCAINE HYDROCHLORIDE 100 MG: 20 INJECTION, SOLUTION EPIDURAL; INFILTRATION; INTRACAUDAL; PERINEURAL at 10:27

## 2023-06-26 RX ADMIN — DEXAMETHASONE SODIUM PHOSPHATE 10 MG: 10 INJECTION, SOLUTION INTRAMUSCULAR; INTRAVENOUS at 10:35

## 2023-06-26 RX ADMIN — KETOROLAC TROMETHAMINE 15 MG: 30 INJECTION, SOLUTION INTRAMUSCULAR at 11:15

## 2023-06-26 ASSESSMENT — PAIN - FUNCTIONAL ASSESSMENT
PAIN_FUNCTIONAL_ASSESSMENT: NONE - DENIES PAIN
PAIN_FUNCTIONAL_ASSESSMENT: 0-10

## 2023-06-26 ASSESSMENT — PAIN DESCRIPTION - DESCRIPTORS: DESCRIPTORS: BURNING

## 2023-06-26 ASSESSMENT — PAIN SCALES - GENERAL
PAINLEVEL_OUTOF10: 5
PAINLEVEL_OUTOF10: 8

## 2023-06-26 ASSESSMENT — PAIN DESCRIPTION - ORIENTATION: ORIENTATION: LEFT

## 2023-06-26 ASSESSMENT — PAIN DESCRIPTION - LOCATION: LOCATION: FLANK

## 2023-06-26 NOTE — OP NOTE
Operative Note      Patient: Bonita Tobias  YOB: 1980  MRN: [id number]    Date of Procedure: 6/26/2023    Pre-Op Diagnosis Codes:     * Cicatrix [L90.5]    Post-Op Diagnosis: Same       Procedure(s):  EXCISION OF ABDOMINAL CICATRIX          CPT CODE 96545 ABND 73915    Surgeon(s):  Finn Jones DO    Assistant:   Resident: DO Tigre Aguirre DO    Anesthesia: General    Estimated Blood Loss (mL): 7cc    Complications: None    Specimens:   ID Type Source Tests Collected by Time Destination   A : LEFT FLANK SKIN WITH SCAR Tissue Abdomen SURGICAL PATHOLOGY Finn Jones DO 6/26/2023 1109      Implants:  * No implants in log *      Drains: * No LDAs found *    Findings:   16 x 7 x 2 cm deep scar excision including skin and subcutaneous fat    Detailed Description of Procedure:   HISTORY: The patient is a 38 y.o. female with history of panniculectomy and right lateral abdominal wall cicatrix excision. She presents today for left lateral abdominal wall scar excision. Risks, benefits, expected outcome, and alternatives to the procedure were explained and all questions answered. Patient understands and signed a consent for procedure. PROCEDURE: The patient was brought to the operating room and placed on the operating table in a supine position. A time out was performed to confirm patient, procedure, location, and allergies. General anesthesia was given. The patient was then positioned in right lateral decubitus with arms and legs appropriately cushioned. SCDs were in place. The patient received appropriate pre-op antibiotics. She was then prepped in sterile fashion and draped. The area of concern that had previously been marked prior to surgery was examined on the left inferior lateral abdominal wall. An elliptical incision was made surrounding the site of the scar. The incision was carried down through the skin and the subcutaneous tissues.  The overlying skin was then

## 2023-06-26 NOTE — H&P
Perri Biswas DO at [address] [place] [address]      6years old    UPPER GASTROINTESTINAL ENDOSCOPY N/A 01/29/2021    EGD BIOPSY performed by Kelsie Pulido DO at [facility]        Medications Prior to Admission:     Prior to Admission medications    Medication Sig Start Date End Date Taking? Authorizing Provider   carvedilol (COREG) 12.5 MG tablet TAKE 1 TABLET BY MOUTH TWICE DAILY 5/18/23   LUZ Bolanos CNP   losartan (COZAAR) 100 MG tablet TAKE 1 TABLET BY MOUTH ONCE DAILY 5/1/23   LUZ Bolanos CNP   ondansetron (ZOFRAN-ODT) 4 MG disintegrating tablet Take 1 tablet by mouth 3 times daily as needed for Nausea or Vomiting 2/17/23   Rose Cortes DO   Probiotic Product (PROBIOTIC PO) Take 1 caplet by mouth daily    Historical Provider, MD   amLODIPine (NORVASC) 10 MG tablet Take 1 tablet by mouth daily 10/24/22   LUZ Bolanos CNP   spironolactone (ALDACTONE) 25 MG tablet TAKE 1 TABLET BY MOUTH IN THE MORNING 10/24/22   LUZ Bolanos CNP   vitamin D (ERGOCALCIFEROL) 1.25 MG (25683 UT) CAPS capsule Take 1 capsule by mouth once a week for 8 doses  Patient taking differently: Take 50,000 Units by mouth once a week Thursdays 6/6/22 12/12/22  LUZ Carvajal CNP   Multiple Vitamins-Minerals (MULTIVITAMIN WITH MINERALS) tablet Take 2 tablets by mouth daily 4/29/21   LUZ Bolanos CNP        Allergies:     Patient has no known allergies. Social History:     Tobacco:    reports that she has never smoked. She has never used smokeless tobacco.  Alcohol:      reports no history of alcohol use. Drug Use:  reports no history of drug use.     Family History:     Family History   Problem Relation Age of Onset    Hypertension Mother     Asthma Mother     Hypertension Maternal Aunt     Asthma Maternal Uncle     Hypertension Maternal Uncle     Arthritis Maternal Grandmother     Hypertension Maternal Grandmother     Stroke Maternal Grandmother     Breast Cancer Neg Hx

## 2023-06-26 NOTE — DISCHARGE INSTRUCTIONS
Surgery Patient Discharge Instructions    Discharge Date:  6/26/2023    Discharged To: Home    RESUME ACTIVITY:     WOUND CARE:   Skin glue used, do not peel off, allow to fall off naturally. BATHING:  Ok to shower in 24 hrs. DRIVING: No driving for while on pain medication    RETURN TO WORK:   Desk job only    WALKING:    Yes    LIFTING: Avoid lifting objects heavier than 10 lbs until cleared by surgeon    DIET:   [address] to resume regular diet.      SPECIAL INSTRUCTIONS:  After you leave the hospital, call your doctor if any of the following occurs:   Pain or symptoms that worsen   Other new symptoms   Signs of infection, including fever and chills   Nausea and/or vomiting that you can't control with the medications you were given   Pain that you can't control with the medications you've been given   Excessive tenderness or swelling   Changes in bowel or sexual function   Dizziness or lightheadedness   Rash or hives       Watch for signs of infection:    Excessive warmth or bright redness around your incisions    Leakage of bloody or cloudy fluid from you incisions    Fever over 100.5

## 2023-06-26 NOTE — BRIEF OP NOTE
Brief Postoperative Note      Patient: Myra Capps  YOB: 1980  MRN: [id number]    Date of Procedure: 6/26/2023    Pre-Op Diagnosis Codes:     * Cicatrix [L90.5]    Post-Op Diagnosis: Same       Procedure(s):  EXCISION OF ABDOMINAL CICATRIX          CPT CODE 52343 ABND 65698    Surgeon(s):  Ambrocio Avery DO    Assistant:  Resident: Suly Paz DO    Anesthesia: General    Estimated Blood Loss (mL): Minimal    Complications: None    Specimens:   ID Type Source Tests Collected by Time Destination   A : LEFT FLANK SKIN WITH SCAR Tissue Abdomen SURGICAL PATHOLOGY Ambrocio Avery DO 6/26/2023 1109        Implants:  * No implants in log *      Drains: * No LDAs found *    Findings: wound class I, 16 x 7 x 2 cm deep cicatrik excision including skin and subcutaneous fat      Electronically signed by Mikayla Blank DO on 6/26/2023 at 11:55 AM

## 2023-06-27 LAB — DERMATOLOGY PATHOLOGY REPORT: NORMAL

## 2023-10-18 ENCOUNTER — TELEPHONE (OUTPATIENT)
Dept: BARIATRICS/WEIGHT MGMT | Age: 43
End: 2023-10-18

## 2023-10-18 NOTE — TELEPHONE ENCOUNTER
Called and spoke with patient about annual post op visit and she wants to check her work schedule and will call us back

## 2024-04-16 ENCOUNTER — OFFICE VISIT (OUTPATIENT)
Dept: FAMILY MEDICINE CLINIC | Age: 44
End: 2024-04-16
Payer: MEDICAID

## 2024-04-16 VITALS
HEART RATE: 104 BPM | TEMPERATURE: 98.3 F | DIASTOLIC BLOOD PRESSURE: 106 MMHG | OXYGEN SATURATION: 98 % | SYSTOLIC BLOOD PRESSURE: 169 MMHG | BODY MASS INDEX: 24.61 KG/M2 | WEIGHT: 143.4 LBS

## 2024-04-16 DIAGNOSIS — E87.6 HYPOKALEMIA: ICD-10-CM

## 2024-04-16 DIAGNOSIS — F43.0 ACUTE STRESS REACTION: ICD-10-CM

## 2024-04-16 DIAGNOSIS — F43.21 GRIEF: ICD-10-CM

## 2024-04-16 DIAGNOSIS — I16.0 HYPERTENSIVE URGENCY: Primary | ICD-10-CM

## 2024-04-16 DIAGNOSIS — Z98.84 S/P LAPAROSCOPIC SLEEVE GASTRECTOMY: ICD-10-CM

## 2024-04-16 DIAGNOSIS — Z91.148 NONCOMPLIANCE WITH MEDICATION REGIMEN: ICD-10-CM

## 2024-04-16 DIAGNOSIS — E78.2 MIXED HYPERLIPIDEMIA: ICD-10-CM

## 2024-04-16 DIAGNOSIS — Z86.39 HISTORY OF DIABETES MELLITUS: ICD-10-CM

## 2024-04-16 DIAGNOSIS — I10 ESSENTIAL HYPERTENSION: ICD-10-CM

## 2024-04-16 PROCEDURE — 3077F SYST BP >= 140 MM HG: CPT | Performed by: NURSE PRACTITIONER

## 2024-04-16 PROCEDURE — 3079F DIAST BP 80-89 MM HG: CPT | Performed by: NURSE PRACTITIONER

## 2024-04-16 PROCEDURE — 99215 OFFICE O/P EST HI 40 MIN: CPT | Performed by: NURSE PRACTITIONER

## 2024-04-16 SDOH — ECONOMIC STABILITY: FOOD INSECURITY: WITHIN THE PAST 12 MONTHS, YOU WORRIED THAT YOUR FOOD WOULD RUN OUT BEFORE YOU GOT MONEY TO BUY MORE.: NEVER TRUE

## 2024-04-16 SDOH — ECONOMIC STABILITY: FOOD INSECURITY: WITHIN THE PAST 12 MONTHS, THE FOOD YOU BOUGHT JUST DIDN'T LAST AND YOU DIDN'T HAVE MONEY TO GET MORE.: NEVER TRUE

## 2024-04-16 SDOH — ECONOMIC STABILITY: INCOME INSECURITY: HOW HARD IS IT FOR YOU TO PAY FOR THE VERY BASICS LIKE FOOD, HOUSING, MEDICAL CARE, AND HEATING?: NOT HARD AT ALL

## 2024-04-16 ASSESSMENT — ENCOUNTER SYMPTOMS
VOMITING: 0
CHEST TIGHTNESS: 0
EYES NEGATIVE: 1
SHORTNESS OF BREATH: 0
COUGH: 0
ALLERGIC/IMMUNOLOGIC NEGATIVE: 1
GASTROINTESTINAL NEGATIVE: 1
RESPIRATORY NEGATIVE: 1
NAUSEA: 0
COLOR CHANGE: 0
DIARRHEA: 0

## 2024-04-16 ASSESSMENT — PATIENT HEALTH QUESTIONNAIRE - PHQ9
SUM OF ALL RESPONSES TO PHQ9 QUESTIONS 1 & 2: 2
DEPRESSION UNABLE TO ASSESS: FUNCTIONAL CAPACITY MOTIVATION LIMITS ACCURACY
SUM OF ALL RESPONSES TO PHQ QUESTIONS 1-9: 2
1. LITTLE INTEREST OR PLEASURE IN DOING THINGS: SEVERAL DAYS
SUM OF ALL RESPONSES TO PHQ QUESTIONS 1-9: 2
2. FEELING DOWN, DEPRESSED OR HOPELESS: SEVERAL DAYS

## 2024-04-16 NOTE — PROGRESS NOTES
[facility]  [address] [place] [address]  Dept: [phone]    Sydney Perez is a 43 y.o. female who presents today for her medical conditions/complaintsas noted below.      Sydney Perez is here today c/o Hypertension and Forms (FMLa)    Past Medical History:   Diagnosis Date    Anemia     GERD (gastroesophageal reflux disease)     History of blood transfusion 10/2022    Low hemoglobin after abdominoplasty.    Hyperlipidemia     not on rx    Hypertension     managed per pcp    Kidney stone     Major depressive disorder, recurrent episode, mild (HCC) 10/21/2015    Palmira Hollingsworth    Obesity, Class III, BMI 40-49.9 (morbid obesity) (HCC)     Resolved.    TRISHA (obstructive sleep apnea)     Resolved.    Pre-diabetes     Resolved.    Presence of tooth-root and mandibular implants     all top teeth    Tachycardia     per pt. Saw Dr. REKHA Turcios 2020    Wears contact lenses     Instructed not to wear contacts for surgery      Past Surgical History:   Procedure Laterality Date    ABDOMINOPLASTY  10/2022    [facility]     SECTION      x2    SCAR REVISION N/A 2023    EXCISION CICATRIX SCAR ABDOMEN performed by Reji Chávez DO at [facility] OR    SCAR REVISION Left 2023    EXCISION OF ABDOMINAL CICATRIX          CPT CODE 46715 ABND 89290 performed by Reji Chávez DO at [facility] OR    SLEEVE GASTRECTOMY N/A 2021    XI LAPAROSCOPIC ROBOTIC GASTRECTOMY SLEEVE, HIATAL HERNIA REPAIR, EGD - GI UNIT SCHEDULED. performed by Anthony Parra DO at [facility] OR    TONSILLECTOMY      8 years old    UPPER GASTROINTESTINAL ENDOSCOPY N/A 2021    EGD BIOPSY performed by Anthony Parra DO at [facility]       Family History   Problem Relation Age of Onset    Hypertension Mother     Asthma Mother     Hypertension Maternal Aunt     Asthma Maternal Uncle     Hypertension Maternal Uncle     Arthritis Maternal Grandmother     Hypertension Maternal Grandmother     Stroke

## 2024-04-18 RX ORDER — AMLODIPINE BESYLATE 10 MG/1
10 TABLET ORAL DAILY
Qty: 30 TABLET | Refills: 2 | Status: SHIPPED | OUTPATIENT
Start: 2024-04-18

## 2024-04-18 RX ORDER — CARVEDILOL 12.5 MG/1
12.5 TABLET ORAL 2 TIMES DAILY
Qty: 60 TABLET | Refills: 2 | Status: SHIPPED | OUTPATIENT
Start: 2024-04-18

## 2024-04-20 ENCOUNTER — HOSPITAL ENCOUNTER (OUTPATIENT)
Age: 44
Discharge: HOME | End: 2024-04-20
Payer: COMMERCIAL

## 2024-04-20 DIAGNOSIS — F43.0 ACUTE STRESS REACTION: ICD-10-CM

## 2024-04-20 DIAGNOSIS — I16.0 HYPERTENSIVE URGENCY: ICD-10-CM

## 2024-04-20 DIAGNOSIS — Z98.84 S/P LAPAROSCOPIC SLEEVE GASTRECTOMY: ICD-10-CM

## 2024-04-20 DIAGNOSIS — Z86.39 HISTORY OF DIABETES MELLITUS: ICD-10-CM

## 2024-04-20 DIAGNOSIS — E87.6 HYPOKALEMIA: ICD-10-CM

## 2024-04-20 DIAGNOSIS — F43.21 GRIEF: ICD-10-CM

## 2024-04-20 DIAGNOSIS — E78.2 MIXED HYPERLIPIDEMIA: ICD-10-CM

## 2024-04-20 LAB
25(OH)D3 SERPL-MCNC: 19.2 NG/ML (ref 30–100)
ALBUMIN SERPL-MCNC: 4.5 G/DL (ref 3.5–5.2)
ALBUMIN/GLOB SERPL: 1 {RATIO} (ref 1–2.5)
ALP SERPL-CCNC: 49 U/L (ref 35–104)
ALT SERPL-CCNC: 9 U/L (ref 10–35)
ANION GAP SERPL CALCULATED.3IONS-SCNC: 9 MMOL/L (ref 9–16)
AST SERPL-CCNC: 22 U/L (ref 10–35)
BASOPHILS # BLD: 0.05 K/UL (ref 0–0.2)
BASOPHILS NFR BLD: 1 % (ref 0–2)
BILIRUB SERPL-MCNC: 0.5 MG/DL (ref 0–1.2)
BUN SERPL-MCNC: 9 MG/DL (ref 6–20)
CALCIUM SERPL-MCNC: 9.2 MG/DL (ref 8.6–10.4)
CHLORIDE SERPL-SCNC: 99 MMOL/L (ref 98–107)
CHOLEST SERPL-MCNC: 227 MG/DL (ref 0–199)
CHOLESTEROL/HDL RATIO: 3
CO2 SERPL-SCNC: 27 MMOL/L (ref 20–31)
CREAT SERPL-MCNC: 0.7 MG/DL (ref 0.5–0.9)
EOSINOPHIL # BLD: 0.12 K/UL (ref 0–0.44)
EOSINOPHILS RELATIVE PERCENT: 2 % (ref 1–4)
ERYTHROCYTE [DISTWIDTH] IN BLOOD BY AUTOMATED COUNT: 15.8 % (ref 11.8–14.4)
FOLATE SERPL-MCNC: 13.1 NG/ML (ref 4.8–24.2)
GFR SERPL CREATININE-BSD FRML MDRD: >90 ML/MIN/1.73M2
GLUCOSE SERPL-MCNC: 86 MG/DL (ref 74–99)
HCT VFR BLD AUTO: 32 % (ref 36.3–47.1)
HDLC SERPL-MCNC: 76 MG/DL
HGB BLD-MCNC: 9.4 G/DL (ref 11.9–15.1)
IMM GRANULOCYTES # BLD AUTO: <0.03 K/UL (ref 0–0.3)
IMM GRANULOCYTES NFR BLD: 0 %
IRON SATN MFR SERPL: 6 % (ref 20–55)
IRON SERPL-MCNC: 24 UG/DL (ref 37–145)
LDLC SERPL CALC-MCNC: 142 MG/DL (ref 0–100)
LYMPHOCYTES NFR BLD: 1.97 K/UL (ref 1.1–3.7)
LYMPHOCYTES RELATIVE PERCENT: 37 % (ref 24–43)
MAGNESIUM SERPL-MCNC: 1.9 MG/DL (ref 1.6–2.6)
MCH RBC QN AUTO: 21.8 PG (ref 25.2–33.5)
MCHC RBC AUTO-ENTMCNC: 29.4 G/DL (ref 28.4–34.8)
MCV RBC AUTO: 74.2 FL (ref 82.6–102.9)
MONOCYTES NFR BLD: 0.63 K/UL (ref 0.1–1.2)
MONOCYTES NFR BLD: 12 % (ref 3–12)
NEUTROPHILS NFR BLD: 48 % (ref 36–65)
NEUTS SEG NFR BLD: 2.5 K/UL (ref 1.5–8.1)
NRBC BLD-RTO: 0 PER 100 WBC
PLATELET # BLD AUTO: 508 K/UL (ref 138–453)
PMV BLD AUTO: 8.6 FL (ref 8.1–13.5)
POTASSIUM SERPL-SCNC: 3.2 MMOL/L (ref 3.7–5.3)
PROT SERPL-MCNC: 8.5 G/DL (ref 6.6–8.7)
RBC # BLD AUTO: 4.31 M/UL (ref 3.95–5.11)
RBC # BLD: ABNORMAL 10*6/UL
SODIUM SERPL-SCNC: 135 MMOL/L (ref 136–145)
TIBC SERPL-MCNC: 415 UG/DL (ref 250–450)
TRIGL SERPL-MCNC: 46 MG/DL (ref 0–149)
TSH SERPL DL<=0.05 MIU/L-ACNC: 0.78 UIU/ML (ref 0.27–4.2)
UNSATURATED IRON BINDING CAPACITY: 391 UG/DL (ref 112–347)
VIT B12 SERPL-MCNC: 625 PG/ML (ref 232–1245)
VLDLC SERPL CALC-MCNC: 9 MG/DL
WBC OTHER # BLD: 5.3 K/UL (ref 3.5–11.3)

## 2024-04-20 PROCEDURE — 80061 LIPID PANEL: CPT

## 2024-04-20 PROCEDURE — 83735 ASSAY OF MAGNESIUM: CPT

## 2024-04-20 PROCEDURE — 83550 IRON BINDING TEST: CPT

## 2024-04-20 PROCEDURE — 82306 VITAMIN D 25 HYDROXY: CPT

## 2024-04-20 PROCEDURE — 85025 COMPLETE CBC W/AUTO DIFF WBC: CPT

## 2024-04-20 PROCEDURE — 36415 COLL VENOUS BLD VENIPUNCTURE: CPT

## 2024-04-20 PROCEDURE — 82607 VITAMIN B-12: CPT

## 2024-04-20 PROCEDURE — 83036 HEMOGLOBIN GLYCOSYLATED A1C: CPT

## 2024-04-20 PROCEDURE — 84443 ASSAY THYROID STIM HORMONE: CPT

## 2024-04-20 PROCEDURE — 80053 COMPREHEN METABOLIC PANEL: CPT

## 2024-04-20 PROCEDURE — 82746 ASSAY OF FOLIC ACID SERUM: CPT

## 2024-04-20 PROCEDURE — 83540 ASSAY OF IRON: CPT

## 2024-04-21 LAB
EST. AVERAGE GLUCOSE BLD GHB EST-MCNC: 103 MG/DL
HBA1C MFR BLD: 5.2 % (ref 4–6)

## 2024-04-22 ENCOUNTER — TELEPHONE (OUTPATIENT)
Dept: FAMILY MEDICINE CLINIC | Age: 44
End: 2024-04-22

## 2024-04-22 ENCOUNTER — TELEPHONE (OUTPATIENT)
Dept: GASTROENTEROLOGY | Age: 44
End: 2024-04-22

## 2024-04-22 DIAGNOSIS — Z12.11 SCREENING FOR COLON CANCER: ICD-10-CM

## 2024-04-22 DIAGNOSIS — E87.6 HYPOKALEMIA: Primary | ICD-10-CM

## 2024-04-22 DIAGNOSIS — E55.9 VITAMIN D DEFICIENCY: ICD-10-CM

## 2024-04-22 DIAGNOSIS — D50.9 IRON DEFICIENCY ANEMIA, UNSPECIFIED IRON DEFICIENCY ANEMIA TYPE: ICD-10-CM

## 2024-04-22 DIAGNOSIS — K90.9 IRON MALABSORPTION: ICD-10-CM

## 2024-04-22 DIAGNOSIS — D64.9 NORMOCYTIC ANEMIA: ICD-10-CM

## 2024-04-22 DIAGNOSIS — D50.8 IRON DEFICIENCY ANEMIA SECONDARY TO INADEQUATE DIETARY IRON INTAKE: Primary | ICD-10-CM

## 2024-04-22 RX ORDER — POTASSIUM CHLORIDE 20 MEQ/1
20 TABLET, EXTENDED RELEASE ORAL DAILY
Qty: 90 TABLET | Refills: 2 | Status: SHIPPED | OUTPATIENT
Start: 2024-04-22 | End: 2024-04-23 | Stop reason: SDUPTHER

## 2024-04-22 NOTE — TELEPHONE ENCOUNTER
SUBJECT: Referral     PATIENT/CALLER: Patient    CURRENT SYMPTOMS: Patient calling stating that she tried to schedule an appointment Hematology and they told her she needs a referral.

## 2024-04-22 NOTE — TELEPHONE ENCOUNTER
Writer talked to patients and she would like to call back and schedule from referral for dr fernandez.

## 2024-04-23 ENCOUNTER — OFFICE VISIT (OUTPATIENT)
Dept: FAMILY MEDICINE CLINIC | Age: 44
End: 2024-04-23
Payer: COMMERCIAL

## 2024-04-23 VITALS
OXYGEN SATURATION: 100 % | HEART RATE: 57 BPM | DIASTOLIC BLOOD PRESSURE: 84 MMHG | TEMPERATURE: 98.4 F | SYSTOLIC BLOOD PRESSURE: 140 MMHG | BODY MASS INDEX: 24.89 KG/M2 | WEIGHT: 145 LBS

## 2024-04-23 DIAGNOSIS — N92.0 MENORRHAGIA WITH REGULAR CYCLE: ICD-10-CM

## 2024-04-23 DIAGNOSIS — Z98.84 S/P LAPAROSCOPIC SLEEVE GASTRECTOMY: ICD-10-CM

## 2024-04-23 DIAGNOSIS — I1A.0 RESISTANT HYPERTENSION: Primary | ICD-10-CM

## 2024-04-23 DIAGNOSIS — Z91.148 NONCOMPLIANCE WITH MEDICATION REGIMEN: ICD-10-CM

## 2024-04-23 DIAGNOSIS — D50.9 IRON DEFICIENCY ANEMIA, UNSPECIFIED IRON DEFICIENCY ANEMIA TYPE: ICD-10-CM

## 2024-04-23 DIAGNOSIS — E87.6 HYPOKALEMIA: ICD-10-CM

## 2024-04-23 DIAGNOSIS — E55.9 VITAMIN D DEFICIENCY: ICD-10-CM

## 2024-04-23 DIAGNOSIS — K90.9 IRON MALABSORPTION: ICD-10-CM

## 2024-04-23 PROCEDURE — 3079F DIAST BP 80-89 MM HG: CPT | Performed by: NURSE PRACTITIONER

## 2024-04-23 PROCEDURE — 99214 OFFICE O/P EST MOD 30 MIN: CPT | Performed by: NURSE PRACTITIONER

## 2024-04-23 PROCEDURE — 3077F SYST BP >= 140 MM HG: CPT | Performed by: NURSE PRACTITIONER

## 2024-04-23 RX ORDER — POTASSIUM CHLORIDE 20 MEQ/1
20 TABLET, EXTENDED RELEASE ORAL DAILY
Qty: 90 TABLET | Refills: 2 | Status: SHIPPED | OUTPATIENT
Start: 2024-04-23

## 2024-04-23 RX ORDER — AMLODIPINE BESYLATE 10 MG/1
10 TABLET ORAL DAILY
Qty: 90 TABLET | Refills: 1 | Status: SHIPPED | OUTPATIENT
Start: 2024-04-23

## 2024-04-23 RX ORDER — CARVEDILOL 12.5 MG/1
12.5 TABLET ORAL 2 TIMES DAILY
Qty: 180 TABLET | Refills: 1 | Status: SHIPPED | OUTPATIENT
Start: 2024-04-23

## 2024-04-23 ASSESSMENT — ENCOUNTER SYMPTOMS
EYES NEGATIVE: 1
RESPIRATORY NEGATIVE: 1
COLOR CHANGE: 0
VOMITING: 0
SHORTNESS OF BREATH: 0
CHEST TIGHTNESS: 0
NAUSEA: 0
ANAL BLEEDING: 0
GASTROINTESTINAL NEGATIVE: 1
DIARRHEA: 0
COUGH: 0
BLOOD IN STOOL: 0

## 2024-04-23 ASSESSMENT — PATIENT HEALTH QUESTIONNAIRE - PHQ9: DEPRESSION UNABLE TO ASSESS: PT REFUSES

## 2024-04-23 NOTE — PROGRESS NOTES
[facility]  [address] [place] [address]  Dept: [phone]    Sydney Perez is a 43 y.o. female who presents today for her medical conditions/complaintsas noted below.      Sydney Perez is here today c/o Hypertension      Past Medical History:   Diagnosis Date    Anemia     GERD (gastroesophageal reflux disease)     History of blood transfusion 10/2022    Low hemoglobin after abdominoplasty.    Hyperlipidemia     not on rx    Hypertension     managed per pcp    Kidney stone     Major depressive disorder, recurrent episode, mild (HCC) 10/21/2015    Palmira Hollingsworth    Obesity, Class III, BMI 40-49.9 (morbid obesity) (HCC)     Resolved.    TRISHA (obstructive sleep apnea)     Resolved.    Pre-diabetes     Resolved.    Presence of tooth-root and mandibular implants     all top teeth    Tachycardia     per pt. Saw Dr. REKHA Turcios 2020    Wears contact lenses     Instructed not to wear contacts for surgery      Past Surgical History:   Procedure Laterality Date    ABDOMINOPLASTY  10/2022    [facility]     SECTION      x2    SCAR REVISION N/A 2023    EXCISION CICATRIX SCAR ABDOMEN performed by Reji Chávez DO at [facility] OR    SCAR REVISION Left 2023    EXCISION OF ABDOMINAL CICATRIX          CPT CODE 10936 ABND 74895 performed by Reji Chávez DO at [facility] OR    SLEEVE GASTRECTOMY N/A 2021    XI LAPAROSCOPIC ROBOTIC GASTRECTOMY SLEEVE, HIATAL HERNIA REPAIR, EGD - GI UNIT SCHEDULED. performed by Anthony Parra DO at [facility] OR    TONSILLECTOMY      8 years old    UPPER GASTROINTESTINAL ENDOSCOPY N/A 2021    EGD BIOPSY performed by Anthony Parra DO at [facility]       Family History   Problem Relation Age of Onset    Hypertension Mother     Asthma Mother     Hypertension Maternal Aunt     Asthma Maternal Uncle     Hypertension Maternal Uncle     Arthritis Maternal Grandmother     Hypertension Maternal Grandmother     Stroke Maternal

## 2024-04-24 ENCOUNTER — TELEPHONE (OUTPATIENT)
Dept: FAMILY MEDICINE CLINIC | Age: 44
End: 2024-04-24

## 2024-04-24 NOTE — TELEPHONE ENCOUNTER
Patient called and states that her FMLA form needs to have 8 hours for the duration on Part B question #3. Please refax after correction.

## 2024-05-21 ENCOUNTER — OFFICE VISIT (OUTPATIENT)
Dept: FAMILY MEDICINE CLINIC | Age: 44
End: 2024-05-21
Payer: COMMERCIAL

## 2024-05-21 VITALS
WEIGHT: 143 LBS | OXYGEN SATURATION: 98 % | SYSTOLIC BLOOD PRESSURE: 144 MMHG | DIASTOLIC BLOOD PRESSURE: 88 MMHG | TEMPERATURE: 98.4 F | HEART RATE: 96 BPM | BODY MASS INDEX: 24.55 KG/M2

## 2024-05-21 DIAGNOSIS — E87.6 HYPOKALEMIA DUE TO EXCESSIVE GASTROINTESTINAL LOSS OF POTASSIUM: ICD-10-CM

## 2024-05-21 DIAGNOSIS — D64.9 NORMOCYTIC ANEMIA: ICD-10-CM

## 2024-05-21 DIAGNOSIS — I10 ESSENTIAL HYPERTENSION: Primary | ICD-10-CM

## 2024-05-21 PROCEDURE — 1036F TOBACCO NON-USER: CPT | Performed by: NURSE PRACTITIONER

## 2024-05-21 PROCEDURE — G8427 DOCREV CUR MEDS BY ELIG CLIN: HCPCS | Performed by: NURSE PRACTITIONER

## 2024-05-21 PROCEDURE — 3079F DIAST BP 80-89 MM HG: CPT | Performed by: NURSE PRACTITIONER

## 2024-05-21 PROCEDURE — 99214 OFFICE O/P EST MOD 30 MIN: CPT | Performed by: NURSE PRACTITIONER

## 2024-05-21 PROCEDURE — G8420 CALC BMI NORM PARAMETERS: HCPCS | Performed by: NURSE PRACTITIONER

## 2024-05-21 PROCEDURE — 3077F SYST BP >= 140 MM HG: CPT | Performed by: NURSE PRACTITIONER

## 2024-05-21 RX ORDER — LOSARTAN POTASSIUM 50 MG/1
50 TABLET ORAL DAILY
Qty: 90 TABLET | Refills: 1 | Status: SHIPPED | OUTPATIENT
Start: 2024-05-21

## 2024-05-21 ASSESSMENT — PATIENT HEALTH QUESTIONNAIRE - PHQ9
SUM OF ALL RESPONSES TO PHQ QUESTIONS 1-9: 2
7. TROUBLE CONCENTRATING ON THINGS, SUCH AS READING THE NEWSPAPER OR WATCHING TELEVISION: NOT AT ALL
2. FEELING DOWN, DEPRESSED OR HOPELESS: SEVERAL DAYS
SUM OF ALL RESPONSES TO PHQ QUESTIONS 1-9: 2
6. FEELING BAD ABOUT YOURSELF - OR THAT YOU ARE A FAILURE OR HAVE LET YOURSELF OR YOUR FAMILY DOWN: NOT AT ALL
10. IF YOU CHECKED OFF ANY PROBLEMS, HOW DIFFICULT HAVE THESE PROBLEMS MADE IT FOR YOU TO DO YOUR WORK, TAKE CARE OF THINGS AT HOME, OR GET ALONG WITH OTHER PEOPLE: NOT DIFFICULT AT ALL
SUM OF ALL RESPONSES TO PHQ QUESTIONS 1-9: 2
9. THOUGHTS THAT YOU WOULD BE BETTER OFF DEAD, OR OF HURTING YOURSELF: NOT AT ALL
3. TROUBLE FALLING OR STAYING ASLEEP: NOT AT ALL
5. POOR APPETITE OR OVEREATING: NOT AT ALL
SUM OF ALL RESPONSES TO PHQ9 QUESTIONS 1 & 2: 2
8. MOVING OR SPEAKING SO SLOWLY THAT OTHER PEOPLE COULD HAVE NOTICED. OR THE OPPOSITE, BEING SO FIGETY OR RESTLESS THAT YOU HAVE BEEN MOVING AROUND A LOT MORE THAN USUAL: NOT AT ALL
SUM OF ALL RESPONSES TO PHQ QUESTIONS 1-9: 2
4. FEELING TIRED OR HAVING LITTLE ENERGY: NOT AT ALL
1. LITTLE INTEREST OR PLEASURE IN DOING THINGS: SEVERAL DAYS

## 2024-05-21 ASSESSMENT — ENCOUNTER SYMPTOMS
COUGH: 0
SHORTNESS OF BREATH: 0
DIARRHEA: 0
GASTROINTESTINAL NEGATIVE: 1
COLOR CHANGE: 0
NAUSEA: 0
CHEST TIGHTNESS: 0
RESPIRATORY NEGATIVE: 1
WHEEZING: 0
VOMITING: 0
ALLERGIC/IMMUNOLOGIC NEGATIVE: 1
EYES NEGATIVE: 1

## 2024-05-21 NOTE — PROGRESS NOTES
[facility]  [address] [place] [address]  Dept: [phone]    Sydney Perez is a 43 y.o. female who presents today for her medical conditions/complaintsas noted below.      Sydney Perez is here today c/o Hypertension    Past Medical History:   Diagnosis Date    Anemia     GERD (gastroesophageal reflux disease)     History of blood transfusion 10/2022    Low hemoglobin after abdominoplasty.    Hyperlipidemia     not on rx    Hypertension     managed per pcp    Kidney stone     Major depressive disorder, recurrent episode, mild (HCC) 10/21/2015    Palmira Hollingsworth    Obesity, Class III, BMI 40-49.9 (morbid obesity) (HCC)     Resolved.    TRISHA (obstructive sleep apnea)     Resolved.    Pre-diabetes     Resolved.    Presence of tooth-root and mandibular implants     all top teeth    Tachycardia     per pt. Saw Dr. REKHA Turcios 2020    Wears contact lenses     Instructed not to wear contacts for surgery      Past Surgical History:   Procedure Laterality Date    ABDOMINOPLASTY  10/2022    [facility]     SECTION      x2    SCAR REVISION N/A 2023    EXCISION CICATRIX SCAR ABDOMEN performed by Reji Chávez DO at [facility] OR    SCAR REVISION Left 2023    EXCISION OF ABDOMINAL CICATRIX          CPT CODE 61405 ABND 64165 performed by Reji Chávez DO at [facility] OR    SLEEVE GASTRECTOMY N/A 2021    XI LAPAROSCOPIC ROBOTIC GASTRECTOMY SLEEVE, HIATAL HERNIA REPAIR, EGD - GI UNIT SCHEDULED. performed by Anthony Parra DO at [facility] OR    TONSILLECTOMY      8 years old    UPPER GASTROINTESTINAL ENDOSCOPY N/A 2021    EGD BIOPSY performed by Anthony Parra DO at [facility]       Family History   Problem Relation Age of Onset    Hypertension Mother     Asthma Mother     Hypertension Maternal Aunt     Asthma Maternal Uncle     Hypertension Maternal Uncle     Arthritis Maternal Grandmother     Hypertension Maternal Grandmother     Stroke Maternal

## 2024-06-27 ENCOUNTER — OFFICE VISIT (OUTPATIENT)
Dept: ONCOLOGY | Age: 44
End: 2024-06-27
Payer: COMMERCIAL

## 2024-06-27 ENCOUNTER — TELEPHONE (OUTPATIENT)
Dept: ONCOLOGY | Age: 44
End: 2024-06-27

## 2024-06-27 ENCOUNTER — HOSPITAL ENCOUNTER (OUTPATIENT)
Age: 44
Discharge: HOME | End: 2024-06-27
Payer: COMMERCIAL

## 2024-06-27 VITALS
HEART RATE: 83 BPM | RESPIRATION RATE: 16 BRPM | TEMPERATURE: 98.2 F | DIASTOLIC BLOOD PRESSURE: 108 MMHG | SYSTOLIC BLOOD PRESSURE: 159 MMHG | BODY MASS INDEX: 25.13 KG/M2 | WEIGHT: 146.4 LBS

## 2024-06-27 DIAGNOSIS — D50.8 IRON DEFICIENCY ANEMIA SECONDARY TO INADEQUATE DIETARY IRON INTAKE: ICD-10-CM

## 2024-06-27 DIAGNOSIS — K90.9 IRON MALABSORPTION: ICD-10-CM

## 2024-06-27 DIAGNOSIS — E87.6 HYPOKALEMIA DUE TO EXCESSIVE GASTROINTESTINAL LOSS OF POTASSIUM: ICD-10-CM

## 2024-06-27 DIAGNOSIS — D50.9 MICROCYTIC ANEMIA: Primary | ICD-10-CM

## 2024-06-27 LAB — POTASSIUM SERPL-SCNC: 3.5 MMOL/L (ref 3.7–5.3)

## 2024-06-27 PROCEDURE — 99214 OFFICE O/P EST MOD 30 MIN: CPT | Performed by: INTERNAL MEDICINE

## 2024-06-27 PROCEDURE — G8419 CALC BMI OUT NRM PARAM NOF/U: HCPCS | Performed by: INTERNAL MEDICINE

## 2024-06-27 PROCEDURE — 36415 COLL VENOUS BLD VENIPUNCTURE: CPT

## 2024-06-27 PROCEDURE — 99211 OFF/OP EST MAY X REQ PHY/QHP: CPT | Performed by: INTERNAL MEDICINE

## 2024-06-27 PROCEDURE — 84132 ASSAY OF SERUM POTASSIUM: CPT

## 2024-06-27 PROCEDURE — 1036F TOBACCO NON-USER: CPT | Performed by: INTERNAL MEDICINE

## 2024-06-27 PROCEDURE — 3077F SYST BP >= 140 MM HG: CPT | Performed by: INTERNAL MEDICINE

## 2024-06-27 PROCEDURE — 3080F DIAST BP >= 90 MM HG: CPT | Performed by: INTERNAL MEDICINE

## 2024-06-27 PROCEDURE — G8427 DOCREV CUR MEDS BY ELIG CLIN: HCPCS | Performed by: INTERNAL MEDICINE

## 2024-06-27 RX ORDER — SODIUM CHLORIDE 0.9 % (FLUSH) 0.9 %
5-40 SYRINGE (ML) INJECTION PRN
OUTPATIENT
Start: 2024-06-27

## 2024-06-27 RX ORDER — SODIUM CHLORIDE 9 MG/ML
5-250 INJECTION, SOLUTION INTRAVENOUS PRN
OUTPATIENT
Start: 2024-06-27

## 2024-06-27 RX ORDER — FAMOTIDINE 10 MG/ML
20 INJECTION, SOLUTION INTRAVENOUS
OUTPATIENT
Start: 2024-06-27

## 2024-06-27 RX ORDER — HEPARIN SODIUM (PORCINE) LOCK FLUSH IV SOLN 100 UNIT/ML 100 UNIT/ML
500 SOLUTION INTRAVENOUS PRN
OUTPATIENT
Start: 2024-06-27

## 2024-06-27 RX ORDER — DIPHENHYDRAMINE HYDROCHLORIDE 50 MG/ML
50 INJECTION INTRAMUSCULAR; INTRAVENOUS
OUTPATIENT
Start: 2024-06-27

## 2024-06-27 RX ORDER — ALBUTEROL SULFATE 90 UG/1
4 AEROSOL, METERED RESPIRATORY (INHALATION) PRN
OUTPATIENT
Start: 2024-06-27

## 2024-06-27 RX ORDER — EPINEPHRINE 1 MG/ML
0.3 INJECTION, SOLUTION, CONCENTRATE INTRAVENOUS PRN
OUTPATIENT
Start: 2024-06-27

## 2024-06-27 RX ORDER — ONDANSETRON 2 MG/ML
8 INJECTION INTRAMUSCULAR; INTRAVENOUS
OUTPATIENT
Start: 2024-06-27

## 2024-06-27 RX ORDER — SODIUM CHLORIDE 9 MG/ML
INJECTION, SOLUTION INTRAVENOUS CONTINUOUS
OUTPATIENT
Start: 2024-06-27

## 2024-06-27 RX ORDER — ACETAMINOPHEN 325 MG/1
650 TABLET ORAL
OUTPATIENT
Start: 2024-06-27

## 2024-06-27 NOTE — TELEPHONE ENCOUNTER
SANTY HERE FOR FOLLOW UP   IV iron infusion soon  RV 4-6 months with CBC, Iron studies before RV  NEW ORDER PENDING PRECERT   MD VISIT: 11/21/24 @ 1:30PM   LABS WILL BE MAILED TO PT   AVS PRINTED AND GIVEN ON EXIT

## 2024-06-28 DIAGNOSIS — E87.6 HYPOKALEMIA: ICD-10-CM

## 2024-06-28 RX ORDER — POTASSIUM CHLORIDE 20 MEQ/1
20 TABLET, EXTENDED RELEASE ORAL 2 TIMES DAILY
Qty: 180 TABLET | Refills: 2 | Status: SHIPPED | OUTPATIENT
Start: 2024-06-28

## 2024-07-01 ENCOUNTER — TELEPHONE (OUTPATIENT)
Dept: ONCOLOGY | Age: 44
End: 2024-07-01

## 2024-07-01 NOTE — TELEPHONE ENCOUNTER
I TRIED TO CALL SANTY TO SCHEDULE HER IRON INFUSIONS AND HAD TO LEAVE A MESSAGE TO CALL THE OFFICE TO SCHEDULE.

## 2024-07-03 NOTE — PROGRESS NOTES
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  Status post gastric sleeve 2021.  History of menorrhagia.    CURRENT THERAPY:         Iron replacement  BRIEF CASE HISTORY:      Ms. Sydney Perez is a very pleasant 43 y.o. female with history of multiple co morbidities as listed.  Patient is referred for further management of anemia.  Patient had history of morbid obesity.  She had bariatric surgery 2021.  She lost 106 pounds.  Patient had history of anemia before the surgery.  Anemia was related to heavy menses.  Patient was previously treated with oral iron with no improvement.  She has significant side effects to oral iron.  Recently her symptoms are getting worse since the time of her bariatric surgery.  She is having increasing weakness and fatigue.  No dizziness.  She has shortness of breath on exertion.  Patient has muscle cramps and she is craving ice.  She denies any melena or hematochezia.  No hematemesis.  Patient is non-smoker.  Social alcohol drinking..       INTERIM HISTORY:   Patient is seen for follow up iron def anemia. Patient was last seen 2022. She is c/o increasing weakness and fatigue. No active bleeding. No response to oral iron.  PAST MEDICAL HISTORY: has a past medical history of Anemia, GERD (gastroesophageal reflux disease), History of blood transfusion, Hyperlipidemia, Hypertension, Kidney stone, Major depressive disorder, recurrent episode, mild (HCC), Obesity, Class III, BMI 40-49.9 (morbid obesity) (HCC), TRISHA (obstructive sleep apnea), Pre-diabetes, Presence of tooth-root and mandibular implants, Tachycardia, and Wears contact lenses.    PAST SURGICAL HISTORY: has a past surgical history that includes  section; Upper

## 2024-07-12 ENCOUNTER — HOSPITAL ENCOUNTER (OUTPATIENT)
Dept: INFUSION THERAPY | Facility: MEDICAL CENTER | Age: 44
Discharge: HOME | End: 2024-07-12
Payer: COMMERCIAL

## 2024-07-12 VITALS
DIASTOLIC BLOOD PRESSURE: 98 MMHG | TEMPERATURE: 98.1 F | RESPIRATION RATE: 18 BRPM | SYSTOLIC BLOOD PRESSURE: 174 MMHG | HEART RATE: 89 BPM

## 2024-07-12 DIAGNOSIS — K90.9 IRON MALABSORPTION: ICD-10-CM

## 2024-07-12 DIAGNOSIS — D50.8 IRON DEFICIENCY ANEMIA SECONDARY TO INADEQUATE DIETARY IRON INTAKE: Primary | ICD-10-CM

## 2024-07-12 PROCEDURE — 6360000002 HC RX W HCPCS: Performed by: INTERNAL MEDICINE

## 2024-07-12 PROCEDURE — 2580000003 HC RX 258: Performed by: INTERNAL MEDICINE

## 2024-07-12 PROCEDURE — 96365 THER/PROPH/DIAG IV INF INIT: CPT

## 2024-07-12 RX ORDER — SODIUM CHLORIDE 9 MG/ML
5-250 INJECTION, SOLUTION INTRAVENOUS PRN
Status: DISCONTINUED | OUTPATIENT
Start: 2024-07-12 | End: 2024-07-13 | Stop reason: HOSPADM

## 2024-07-12 RX ORDER — SODIUM CHLORIDE 0.9 % (FLUSH) 0.9 %
5-40 SYRINGE (ML) INJECTION PRN
Status: DISCONTINUED | OUTPATIENT
Start: 2024-07-12 | End: 2024-07-13 | Stop reason: HOSPADM

## 2024-07-12 RX ORDER — EPINEPHRINE 1 MG/ML
0.3 INJECTION, SOLUTION INTRAMUSCULAR; SUBCUTANEOUS PRN
OUTPATIENT
Start: 2024-07-19

## 2024-07-12 RX ORDER — ONDANSETRON 2 MG/ML
8 INJECTION INTRAMUSCULAR; INTRAVENOUS
OUTPATIENT
Start: 2024-07-19

## 2024-07-12 RX ORDER — SODIUM CHLORIDE 9 MG/ML
5-250 INJECTION, SOLUTION INTRAVENOUS PRN
OUTPATIENT
Start: 2024-07-19

## 2024-07-12 RX ORDER — DIPHENHYDRAMINE HYDROCHLORIDE 50 MG/ML
50 INJECTION INTRAMUSCULAR; INTRAVENOUS
OUTPATIENT
Start: 2024-07-19

## 2024-07-12 RX ORDER — ALBUTEROL SULFATE 90 UG/1
4 AEROSOL, METERED RESPIRATORY (INHALATION) PRN
OUTPATIENT
Start: 2024-07-19

## 2024-07-12 RX ORDER — SODIUM CHLORIDE 0.9 % (FLUSH) 0.9 %
5-40 SYRINGE (ML) INJECTION PRN
OUTPATIENT
Start: 2024-07-19

## 2024-07-12 RX ORDER — HEPARIN 100 UNIT/ML
500 SYRINGE INTRAVENOUS PRN
OUTPATIENT
Start: 2024-07-19

## 2024-07-12 RX ORDER — SODIUM CHLORIDE 9 MG/ML
INJECTION, SOLUTION INTRAVENOUS CONTINUOUS
OUTPATIENT
Start: 2024-07-19

## 2024-07-12 RX ORDER — ACETAMINOPHEN 325 MG/1
650 TABLET ORAL
OUTPATIENT
Start: 2024-07-19

## 2024-07-12 RX ORDER — FAMOTIDINE 10 MG/ML
20 INJECTION, SOLUTION INTRAVENOUS
OUTPATIENT
Start: 2024-07-19

## 2024-07-12 RX ADMIN — FERRIC CARBOXYMALTOSE INJECTION 750 MG: 50 INJECTION, SOLUTION INTRAVENOUS at 09:48

## 2024-07-12 RX ADMIN — SODIUM CHLORIDE 125 ML/HR: 9 INJECTION, SOLUTION INTRAVENOUS at 09:38

## 2024-07-12 NOTE — PROGRESS NOTES
Pt arrives per amb per self and orders reviewed and NS infusing before and after injectafer and pt states has had previously without problem and reviewed time and date of next appt on 7/19/24 at 0900 and pt states understanding.  Pt tolerated injectafer well and no reactions or complaints and blood return present throughout infusion and pt discharged per amb per self.

## 2024-07-17 ENCOUNTER — CLINICAL DOCUMENTATION (OUTPATIENT)
Facility: HOSPITAL | Age: 44
End: 2024-07-17

## 2024-07-19 ENCOUNTER — HOSPITAL ENCOUNTER (OUTPATIENT)
Dept: INFUSION THERAPY | Facility: MEDICAL CENTER | Age: 44
End: 2024-07-19

## 2024-07-29 ENCOUNTER — HOSPITAL ENCOUNTER (OUTPATIENT)
Dept: INFUSION THERAPY | Facility: MEDICAL CENTER | Age: 44
Discharge: HOME | End: 2024-07-29
Payer: COMMERCIAL

## 2024-07-29 VITALS — HEART RATE: 89 BPM | DIASTOLIC BLOOD PRESSURE: 93 MMHG | SYSTOLIC BLOOD PRESSURE: 147 MMHG | TEMPERATURE: 98.1 F

## 2024-07-29 DIAGNOSIS — K90.9 IRON MALABSORPTION: ICD-10-CM

## 2024-07-29 DIAGNOSIS — D50.8 IRON DEFICIENCY ANEMIA SECONDARY TO INADEQUATE DIETARY IRON INTAKE: Primary | ICD-10-CM

## 2024-07-29 PROCEDURE — 2580000003 HC RX 258: Performed by: INTERNAL MEDICINE

## 2024-07-29 PROCEDURE — 6360000002 HC RX W HCPCS: Performed by: INTERNAL MEDICINE

## 2024-07-29 PROCEDURE — 96365 THER/PROPH/DIAG IV INF INIT: CPT

## 2024-07-29 RX ORDER — ONDANSETRON 2 MG/ML
8 INJECTION INTRAMUSCULAR; INTRAVENOUS
OUTPATIENT
Start: 2024-08-02

## 2024-07-29 RX ORDER — HEPARIN 100 UNIT/ML
500 SYRINGE INTRAVENOUS PRN
OUTPATIENT
Start: 2024-08-02

## 2024-07-29 RX ORDER — SODIUM CHLORIDE 9 MG/ML
5-250 INJECTION, SOLUTION INTRAVENOUS PRN
OUTPATIENT
Start: 2024-08-02

## 2024-07-29 RX ORDER — ACETAMINOPHEN 325 MG/1
650 TABLET ORAL
OUTPATIENT
Start: 2024-08-02

## 2024-07-29 RX ORDER — DIPHENHYDRAMINE HYDROCHLORIDE 50 MG/ML
50 INJECTION INTRAMUSCULAR; INTRAVENOUS
OUTPATIENT
Start: 2024-08-02

## 2024-07-29 RX ORDER — SODIUM CHLORIDE 9 MG/ML
5-250 INJECTION, SOLUTION INTRAVENOUS PRN
Status: DISCONTINUED | OUTPATIENT
Start: 2024-07-29 | End: 2024-07-30 | Stop reason: HOSPADM

## 2024-07-29 RX ORDER — ALBUTEROL SULFATE 90 UG/1
4 AEROSOL, METERED RESPIRATORY (INHALATION) PRN
OUTPATIENT
Start: 2024-08-02

## 2024-07-29 RX ORDER — SODIUM CHLORIDE 0.9 % (FLUSH) 0.9 %
5-40 SYRINGE (ML) INJECTION PRN
OUTPATIENT
Start: 2024-08-02

## 2024-07-29 RX ORDER — SODIUM CHLORIDE 9 MG/ML
5-250 INJECTION, SOLUTION INTRAVENOUS PRN
Status: CANCELLED | OUTPATIENT
Start: 2024-08-02

## 2024-07-29 RX ORDER — EPINEPHRINE 1 MG/ML
0.3 INJECTION, SOLUTION INTRAMUSCULAR; SUBCUTANEOUS PRN
OUTPATIENT
Start: 2024-08-02

## 2024-07-29 RX ORDER — SODIUM CHLORIDE 9 MG/ML
INJECTION, SOLUTION INTRAVENOUS CONTINUOUS
OUTPATIENT
Start: 2024-08-02

## 2024-07-29 RX ORDER — FAMOTIDINE 10 MG/ML
20 INJECTION, SOLUTION INTRAVENOUS
OUTPATIENT
Start: 2024-08-02

## 2024-07-29 RX ADMIN — FERRIC CARBOXYMALTOSE INJECTION 750 MG: 50 INJECTION, SOLUTION INTRAVENOUS at 13:41

## 2024-07-29 RX ADMIN — SODIUM CHLORIDE 100 ML/HR: 9 INJECTION, SOLUTION INTRAVENOUS at 13:20

## 2024-07-29 NOTE — PROGRESS NOTES
Pt here for 2/2 injectafer  Arrives ambulatory   Pt mentioned she has anxiety about needles. Denies any other complaints/concerns.   She tolerated 1st infusion  Peripheral IV started by Vero BENJAMIN, per patient request.  TX completed without incident   Patient discharged in stable condition  Patient returns 11/21 for MD visit

## 2024-08-26 ENCOUNTER — OFFICE VISIT (OUTPATIENT)
Dept: FAMILY MEDICINE CLINIC | Age: 44
End: 2024-08-26
Payer: COMMERCIAL

## 2024-08-26 ENCOUNTER — HOSPITAL ENCOUNTER (OUTPATIENT)
Age: 44
Setting detail: SPECIMEN
Discharge: HOME | End: 2024-08-26

## 2024-08-26 VITALS
TEMPERATURE: 98.6 F | BODY MASS INDEX: 24.45 KG/M2 | HEART RATE: 100 BPM | WEIGHT: 143.2 LBS | HEIGHT: 64 IN | OXYGEN SATURATION: 98 % | DIASTOLIC BLOOD PRESSURE: 90 MMHG | SYSTOLIC BLOOD PRESSURE: 170 MMHG

## 2024-08-26 DIAGNOSIS — Z91.148 NONCOMPLIANCE WITH MEDICATION REGIMEN: ICD-10-CM

## 2024-08-26 DIAGNOSIS — Z00.00 ROUTINE PHYSICAL EXAMINATION: Primary | ICD-10-CM

## 2024-08-26 DIAGNOSIS — Z12.31 ENCOUNTER FOR SCREENING MAMMOGRAM FOR MALIGNANT NEOPLASM OF BREAST: ICD-10-CM

## 2024-08-26 DIAGNOSIS — Z01.84 IMMUNITY STATUS TESTING: ICD-10-CM

## 2024-08-26 DIAGNOSIS — Z12.11 SCREENING FOR COLON CANCER: ICD-10-CM

## 2024-08-26 DIAGNOSIS — E87.6 HYPOKALEMIA: ICD-10-CM

## 2024-08-26 DIAGNOSIS — I10 ESSENTIAL HYPERTENSION: ICD-10-CM

## 2024-08-26 DIAGNOSIS — N92.0 MENORRHAGIA WITH REGULAR CYCLE: ICD-10-CM

## 2024-08-26 LAB
ANION GAP SERPL CALCULATED.3IONS-SCNC: 9 MMOL/L (ref 9–16)
BUN SERPL-MCNC: 8 MG/DL (ref 6–20)
CALCIUM SERPL-MCNC: 9 MG/DL (ref 8.6–10.4)
CHLORIDE SERPL-SCNC: 102 MMOL/L (ref 98–107)
CO2 SERPL-SCNC: 29 MMOL/L (ref 20–31)
CREAT SERPL-MCNC: 0.8 MG/DL (ref 0.5–0.9)
GFR, ESTIMATED: >90 ML/MIN/1.73M2
GLUCOSE SERPL-MCNC: 120 MG/DL (ref 74–99)
POTASSIUM SERPL-SCNC: 3.1 MMOL/L (ref 3.7–5.3)
RUBV IGG SERPL QL IA: 29.9 IU/ML
SODIUM SERPL-SCNC: 140 MMOL/L (ref 136–145)

## 2024-08-26 PROCEDURE — 1036F TOBACCO NON-USER: CPT | Performed by: NURSE PRACTITIONER

## 2024-08-26 PROCEDURE — 3077F SYST BP >= 140 MM HG: CPT | Performed by: NURSE PRACTITIONER

## 2024-08-26 PROCEDURE — 3080F DIAST BP >= 90 MM HG: CPT | Performed by: NURSE PRACTITIONER

## 2024-08-26 PROCEDURE — 99396 PREV VISIT EST AGE 40-64: CPT | Performed by: NURSE PRACTITIONER

## 2024-08-26 PROCEDURE — 99214 OFFICE O/P EST MOD 30 MIN: CPT | Performed by: NURSE PRACTITIONER

## 2024-08-26 PROCEDURE — G8427 DOCREV CUR MEDS BY ELIG CLIN: HCPCS | Performed by: NURSE PRACTITIONER

## 2024-08-26 PROCEDURE — G8420 CALC BMI NORM PARAMETERS: HCPCS | Performed by: NURSE PRACTITIONER

## 2024-08-26 RX ORDER — POTASSIUM CHLORIDE 1500 MG/1
20 TABLET, EXTENDED RELEASE ORAL DAILY
Qty: 180 TABLET | Refills: 2
Start: 2024-08-26

## 2024-08-26 ASSESSMENT — ENCOUNTER SYMPTOMS
NAUSEA: 0
COLOR CHANGE: 0
SHORTNESS OF BREATH: 0
GASTROINTESTINAL NEGATIVE: 1
RESPIRATORY NEGATIVE: 1
VOMITING: 0
WHEEZING: 0
CONSTIPATION: 0
ALLERGIC/IMMUNOLOGIC NEGATIVE: 1
COUGH: 0
DIARRHEA: 0
CHEST TIGHTNESS: 0
EYES NEGATIVE: 1

## 2024-08-26 ASSESSMENT — PATIENT HEALTH QUESTIONNAIRE - PHQ9
2. FEELING DOWN, DEPRESSED OR HOPELESS: SEVERAL DAYS
1. LITTLE INTEREST OR PLEASURE IN DOING THINGS: SEVERAL DAYS
DEPRESSION UNABLE TO ASSESS: PT REFUSES
SUM OF ALL RESPONSES TO PHQ QUESTIONS 1-9: 2
SUM OF ALL RESPONSES TO PHQ QUESTIONS 1-9: 2
SUM OF ALL RESPONSES TO PHQ9 QUESTIONS 1 & 2: 2
SUM OF ALL RESPONSES TO PHQ QUESTIONS 1-9: 2
SUM OF ALL RESPONSES TO PHQ QUESTIONS 1-9: 2

## 2024-08-26 NOTE — PROGRESS NOTES
[facility]  [address] [place] [address]  Dept: [phone]    Sydney Perez is a 43 y.o. female who presents today for her medical conditions/complaintsas noted below.      Sydney Perez is here today c/o Annual Exam and Forms    Past Medical History:   Diagnosis Date    Anemia     GERD (gastroesophageal reflux disease)     History of blood transfusion 10/2022    Low hemoglobin after abdominoplasty.    Hyperlipidemia     not on rx    Hypertension     managed per pcp    Kidney stone     Major depressive disorder, recurrent episode, mild (HCC) 10/21/2015    [facility]    Obesity, Class III, BMI 40-49.9 (morbid obesity) (HCC)     Resolved.    TRISHA (obstructive sleep apnea)     Resolved.    Pre-diabetes     Resolved.    Presence of tooth-root and mandibular implants     all top teeth    Tachycardia     per pt. Saw Dr. REKHA Turcios 2020    Wears contact lenses     Instructed not to wear contacts for surgery      Past Surgical History:   Procedure Laterality Date    ABDOMINOPLASTY  10/2022    [facility]     SECTION      x2    SCAR REVISION N/A 2023    EXCISION CICATRIX SCAR ABDOMEN performed by Reji Chávez DO at [facility] OR    SCAR REVISION Left 2023    EXCISION OF ABDOMINAL CICATRIX          CPT CODE 40764 ABND 72430 performed by Reji Chávez DO at [facility] OR    SLEEVE GASTRECTOMY N/A 2021    XI LAPAROSCOPIC ROBOTIC GASTRECTOMY SLEEVE, HIATAL HERNIA REPAIR, EGD - GI UNIT SCHEDULED. performed by Anthony Parra DO at [facility] OR    TONSILLECTOMY      8 years old    UPPER GASTROINTESTINAL ENDOSCOPY N/A 2021    EGD BIOPSY performed by Anthony Parra DO at [facility]       Family History   Problem Relation Age of Onset    Hypertension Mother     Asthma Mother     Hypertension Maternal Aunt     Asthma Maternal Uncle     Hypertension Maternal Uncle     Arthritis Maternal Grandmother     Hypertension Maternal Grandmother     Stroke Maternal  BILATERAL; Future    3. Screening for colon cancer    - Fecal DNA Colorectal cancer screening (Cologuard)    4. Immunity status testing    - Mumps Antibody, IgG; Future  - Rubella antibody, IgG; Future  - Rubeola Antibody, IgG; Future  - Quantiferon (R) TB Gold, (Incubated); Future    5. Essential hypertension    BP uncontrolled 2/2 to medication non-compliance  Stressed the importance of medication compliance  Encouraged home BP monitoring  F/u 2-4 weeks or sooner PRN   Labs up to date & reviewed     6. Noncompliance with medication regimen    ^^  Long term consequences of uncontrolled HTN discussed     7. Hypokalemia    - Basic Metabolic Panel; Future  - potassium chloride (KLOR-CON M) 20 MEQ extended release tablet; Take 1 tablet by mouth daily  Dispense: 180 tablet; Refill: 2    Dec to qd due to non compliance  Recheck labs     8. Menorrhagia with regular cycle    Highly encouraged GYN follow-up to discuss cycle management, anemia & PAP update      Discussed use, benefit, and side effects of prescribed medications.All patient questions answered.  Pt voiced understanding. Reviewed health maintenance.Instructed to continue current medications, diet and exercise.  Patient agreedwith treatment plan. Follow up as directed.     Electronically signed by LUZ Yuen - THERESE on 8/26/2024

## 2024-08-27 DIAGNOSIS — E87.6 HYPOKALEMIA: Primary | ICD-10-CM

## 2024-08-28 LAB
MEV IGG SER-ACNC: 5.4
MUV IGG SER IA-ACNC: 1.74

## 2024-08-29 LAB
QUANTI TB GOLD PLUS: NEGATIVE
QUANTI TB1 MINUS NIL: 0.02 IU/ML
QUANTI TB2 MINUS NIL: 0.02 IU/ML
QUANTIFERON MITOGEN: 9.99 IU/ML
QUANTIFERON NIL: 0.01 IU/ML

## 2025-04-08 ENCOUNTER — TELEPHONE (OUTPATIENT)
Dept: BARIATRICS/WEIGHT MGMT | Age: 45
End: 2025-04-08

## 2025-04-08 NOTE — TELEPHONE ENCOUNTER
Spoke with patient in regards to scheduling annual post op appointment. She states she currently does not have medical insurance but will call back to schedule when she does.

## (undated) DEVICE — TROCAR: Brand: KII FIOS FIRST ENTRY

## (undated) DEVICE — SUTURE MCRYL + SZ 4 0 L18IN ABSRB UD PC 3 L16MM 3 8 CIR PRIM MCP845G

## (undated) DEVICE — AGENT HEMSTAT W2XL4IN OXIDIZED REGENERATED CELOS ABSRB

## (undated) DEVICE — SYRINGE IRRIG 60ML SFT PLIABLE BLB EZ TO GRP 1 HND USE W/

## (undated) DEVICE — DRESSING TRNSPAR W5XL4.5IN FLM SHT SEMIPERMEABLE WIND

## (undated) DEVICE — FORCEPS BX L240CM WRK CHN 2.8MM STD CAP W/ NDL MIC MESH

## (undated) DEVICE — RESERVOIR,SUCTION,100CC,SILICONE: Brand: MEDLINE

## (undated) DEVICE — VESSEL SEALER EXTEND: Brand: ENDOWRIST

## (undated) DEVICE — GLOVE ORANGE PI 8   MSG9080

## (undated) DEVICE — SUTURE SZ 0 27IN 5/8 CIR UR-6  TAPER PT VIOLET ABSRB VICRYL J603H

## (undated) DEVICE — TOWEL,OR,DSP,ST,NATURAL,DLX,4/PK,20PK/CS: Brand: MEDLINE

## (undated) DEVICE — REDUCER: Brand: ENDOWRIST

## (undated) DEVICE — SURGICEL ENDOSCP APPL

## (undated) DEVICE — GLOVE SURG SZ 75 CRM LTX FREE POLYISOPRENE POLYMER BEAD ANTI

## (undated) DEVICE — MARKER,SKIN,WI/RULER AND LABELS: Brand: MEDLINE

## (undated) DEVICE — MINOR BSIN PK

## (undated) DEVICE — LIQUIBAND RAPID ADHESIVE 36/CS 0.8ML: Brand: MEDLINE

## (undated) DEVICE — ADHESIVE DERMABOND TOPICAL SKIN MINI .36ML

## (undated) DEVICE — STAPLER 60 RELOAD GREEN: Brand: SUREFORM

## (undated) DEVICE — SUTURE VIC + SH-13-0 27IN  VCP311H

## (undated) DEVICE — APPLICATOR MEDICATED 26 CC SOLUTION HI LT ORNG CHLORAPREP

## (undated) DEVICE — SUTURE VCRL SZ 3-0 L18IN ABSRB UD L26MM SH 1/2 CIR J864D

## (undated) DEVICE — BLANKET WRM W40.2XL55.9IN IORT LO BODY + MISTRAL AIR

## (undated) DEVICE — ADHESIVE SKIN CLSR 0.7ML TOP DERMBND ADV

## (undated) DEVICE — SHEET, T, LAPAROTOMY, STERILE: Brand: MEDLINE

## (undated) DEVICE — CANNULA SEAL

## (undated) DEVICE — Device

## (undated) DEVICE — SOLUTION ANTIFOG VIS SYS CLEARIFY LAPSCP

## (undated) DEVICE — HYPODERMIC SAFETY NEEDLE: Brand: MAGELLAN

## (undated) DEVICE — BLADELESS OBTURATOR: Brand: WECK VISTA

## (undated) DEVICE — COVER LT HNDL BLU PLAS

## (undated) DEVICE — BINDER ABD 2XL W9IN CIRC 62 73IN 3 PNL E HK AND LOOP CLSR W

## (undated) DEVICE — GARMENT,MEDLINE,DVT,INT,CALF,MED, GEN2: Brand: MEDLINE

## (undated) DEVICE — SUTURE ETHBND EXCEL SZ 0 L30IN NONABSORBABLE GRN L26MM CT-2 X412H

## (undated) DEVICE — GLOVE ORANGE PI 7   MSG9070

## (undated) DEVICE — DRAIN SURG 19FR 100% SIL RADPQ RND CHN FULL FLUT

## (undated) DEVICE — SUTURE ETHLN SZ 3-0 L18IN NONABSORBABLE BLK L24MM PS-1 3/8 1663G

## (undated) DEVICE — STAPLER 60 RELOAD BLUE: Brand: SUREFORM

## (undated) DEVICE — BINDER ABD UNIV H9IN WAIST 45-62IN E SFT COT PREM 3 PNL

## (undated) DEVICE — SEAL

## (undated) DEVICE — YANKAUER,FLEXIBLE HANDLE,REGLR CAPACITY: Brand: MEDLINE INDUSTRIES, INC.

## (undated) DEVICE — STAPLER 60: Brand: SUREFORM

## (undated) DEVICE — GAUZE,SPONGE,FLUFF,6"X6.75",STRL,5/TRAY: Brand: MEDLINE

## (undated) DEVICE — VISIGI 3D®  CALIBRATION SYSTEM  SIZE 36FR STD W/ BULB: Brand: BOEHRINGER® VISIGI 3D™ SLEEVE GASTRECTOMY CALIBRATION SYSTEM, SIZE 36FR W/BULB

## (undated) DEVICE — INSUFFLATION TUBING SET WITH FILTER, FUNNEL CONNECTOR AND LUER LOCK: Brand: JOSNOE MEDICAL INC

## (undated) DEVICE — PAD,ABDOMINAL,5"X9",ST,LF,25/BX: Brand: MEDLINE INDUSTRIES, INC.

## (undated) DEVICE — ARM DRAPE

## (undated) DEVICE — AGENT HEMSTAT 3GM OXIDIZED REGENERATED CELOS ABSRB FOR CONT (ORDER MULTIPLES OF 5EA)